# Patient Record
Sex: MALE | Race: WHITE | NOT HISPANIC OR LATINO | Employment: OTHER | URBAN - METROPOLITAN AREA
[De-identification: names, ages, dates, MRNs, and addresses within clinical notes are randomized per-mention and may not be internally consistent; named-entity substitution may affect disease eponyms.]

---

## 2017-01-30 ENCOUNTER — GENERIC CONVERSION - ENCOUNTER (OUTPATIENT)
Dept: OTHER | Facility: OTHER | Age: 57
End: 2017-01-30

## 2017-03-21 ENCOUNTER — GENERIC CONVERSION - ENCOUNTER (OUTPATIENT)
Dept: OTHER | Facility: OTHER | Age: 57
End: 2017-03-21

## 2017-04-26 ENCOUNTER — ALLSCRIPTS OFFICE VISIT (OUTPATIENT)
Dept: OTHER | Facility: OTHER | Age: 57
End: 2017-04-26

## 2017-07-24 ENCOUNTER — GENERIC CONVERSION - ENCOUNTER (OUTPATIENT)
Dept: OTHER | Facility: OTHER | Age: 57
End: 2017-07-24

## 2017-07-25 LAB
A/G RATIO (HISTORICAL): 1.5 (ref 1.2–2.2)
ALBUMIN SERPL BCP-MCNC: 4.4 G/DL (ref 3.5–5.5)
ALP SERPL-CCNC: 61 IU/L (ref 39–117)
ALT SERPL W P-5'-P-CCNC: 14 IU/L (ref 0–44)
AST SERPL W P-5'-P-CCNC: 16 IU/L (ref 0–40)
BILIRUB SERPL-MCNC: 0.4 MG/DL (ref 0–1.2)
BUN SERPL-MCNC: 20 MG/DL (ref 6–24)
BUN/CREA RATIO (HISTORICAL): 17 (ref 9–20)
CALCIUM SERPL-MCNC: 9.3 MG/DL (ref 8.7–10.2)
CHLORIDE SERPL-SCNC: 101 MMOL/L (ref 96–106)
CHOLEST SERPL-MCNC: 177 MG/DL (ref 100–199)
CHOLEST/HDLC SERPL: 3.2 RATIO UNITS (ref 0–5)
CO2 SERPL-SCNC: 24 MMOL/L (ref 18–29)
CREAT SERPL-MCNC: 1.21 MG/DL (ref 0.76–1.27)
EGFR AFRICAN AMERICAN (HISTORICAL): 75 ML/MIN/1.73
EGFR-AMERICAN CALC (HISTORICAL): 65 ML/MIN/1.73
GLUCOSE SERPL-MCNC: 99 MG/DL (ref 65–99)
HBA1C MFR BLD HPLC: 5.9 % (ref 4.8–5.6)
HDLC SERPL-MCNC: 55 MG/DL
LDLC SERPL CALC-MCNC: 99 MG/DL (ref 0–99)
POTASSIUM SERPL-SCNC: 4.4 MMOL/L (ref 3.5–5.2)
PROSTATE SPECIFIC ANTIGEN (HISTORICAL): 2.1 NG/ML (ref 0–4)
SODIUM SERPL-SCNC: 141 MMOL/L (ref 134–144)
TOT. GLOBULIN, SERUM (HISTORICAL): 3 G/DL (ref 1.5–4.5)
TOTAL PROTEIN (HISTORICAL): 7.4 G/DL (ref 6–8.5)
TRIGL SERPL-MCNC: 114 MG/DL (ref 0–149)
VLDLC SERPL CALC-MCNC: 23 MG/DL (ref 5–40)

## 2017-07-26 ENCOUNTER — ALLSCRIPTS OFFICE VISIT (OUTPATIENT)
Dept: OTHER | Facility: OTHER | Age: 57
End: 2017-07-26

## 2017-09-07 ENCOUNTER — GENERIC CONVERSION - ENCOUNTER (OUTPATIENT)
Dept: OTHER | Facility: OTHER | Age: 57
End: 2017-09-07

## 2017-09-08 ENCOUNTER — GENERIC CONVERSION - ENCOUNTER (OUTPATIENT)
Dept: OTHER | Facility: OTHER | Age: 57
End: 2017-09-08

## 2017-11-16 ENCOUNTER — GENERIC CONVERSION - ENCOUNTER (OUTPATIENT)
Dept: OTHER | Facility: OTHER | Age: 57
End: 2017-11-16

## 2017-11-16 LAB
LEFT EYE DIABETIC RETINOPATHY: NORMAL
RIGHT EYE DIABETIC RETINOPATHY: NORMAL

## 2017-11-21 LAB
A/G RATIO (HISTORICAL): 1.6 (ref 1.2–2.2)
ALBUMIN SERPL BCP-MCNC: 4.4 G/DL (ref 3.5–5.5)
ALP SERPL-CCNC: 63 IU/L (ref 39–117)
ALT SERPL W P-5'-P-CCNC: 18 IU/L (ref 0–44)
AST SERPL W P-5'-P-CCNC: 19 IU/L (ref 0–40)
BILIRUB SERPL-MCNC: 0.4 MG/DL (ref 0–1.2)
BUN SERPL-MCNC: 21 MG/DL (ref 6–24)
BUN/CREA RATIO (HISTORICAL): 19 (ref 9–20)
CALCIUM SERPL-MCNC: 9.6 MG/DL (ref 8.7–10.2)
CHLORIDE SERPL-SCNC: 101 MMOL/L (ref 96–106)
CHOLEST SERPL-MCNC: 146 MG/DL (ref 100–199)
CHOLEST/HDLC SERPL: 3.4 RATIO UNITS (ref 0–5)
CO2 SERPL-SCNC: 24 MMOL/L (ref 18–29)
CREAT SERPL-MCNC: 1.09 MG/DL (ref 0.76–1.27)
DEPRECATED RDW RBC AUTO: 13.4 % (ref 12.3–15.4)
EGFR AFRICAN AMERICAN (HISTORICAL): 85 ML/MIN/1.73
EGFR-AMERICAN CALC (HISTORICAL): 74 ML/MIN/1.73
GLUCOSE SERPL-MCNC: 124 MG/DL (ref 65–99)
HBA1C MFR BLD HPLC: 5.9 % (ref 4.8–5.6)
HCT VFR BLD AUTO: 40.2 % (ref 37.5–51)
HDLC SERPL-MCNC: 43 MG/DL
HGB BLD-MCNC: 13.7 G/DL (ref 12.6–17.7)
LDLC SERPL CALC-MCNC: 80 MG/DL (ref 0–99)
MCH RBC QN AUTO: 30.5 PG (ref 26.6–33)
MCHC RBC AUTO-ENTMCNC: 34.1 G/DL (ref 31.5–35.7)
MCV RBC AUTO: 90 FL (ref 79–97)
PLATELET # BLD AUTO: 237 X10E3/UL (ref 150–379)
POTASSIUM SERPL-SCNC: 4.3 MMOL/L (ref 3.5–5.2)
RBC (HISTORICAL): 4.49 X10E6/UL (ref 4.14–5.8)
SODIUM SERPL-SCNC: 139 MMOL/L (ref 134–144)
TOT. GLOBULIN, SERUM (HISTORICAL): 2.8 G/DL (ref 1.5–4.5)
TOTAL PROTEIN (HISTORICAL): 7.2 G/DL (ref 6–8.5)
TRIGL SERPL-MCNC: 117 MG/DL (ref 0–149)
VLDLC SERPL CALC-MCNC: 23 MG/DL (ref 5–40)
WBC # BLD AUTO: 6.4 X10E3/UL (ref 3.4–10.8)

## 2017-11-22 ENCOUNTER — GENERIC CONVERSION - ENCOUNTER (OUTPATIENT)
Dept: OTHER | Facility: OTHER | Age: 57
End: 2017-11-22

## 2017-11-28 ENCOUNTER — ALLSCRIPTS OFFICE VISIT (OUTPATIENT)
Dept: OTHER | Facility: OTHER | Age: 57
End: 2017-11-28

## 2017-11-28 DIAGNOSIS — M25.561 PAIN IN RIGHT KNEE: ICD-10-CM

## 2017-12-11 NOTE — PROGRESS NOTES
Assessment    1  Hypertension (401 9) (I10)   2  Hyperlipidemia (272 4) (E78 5)   3  Diabetes mellitus type 2, uncomplicated (673 47) (V17 8)   4  Non morbid obesity due to excess calories (278 00) (E66 09)   5  Chronic pain of right knee (406 11,798 27) (M25 561,G89 29)    Plan  Chronic pain of right knee    · * XR KNEE 3 VW RIGHT NON INJURY; Status:Active; Requested for:28Nov2017;   Diabetes mellitus type 2, uncomplicated    · (1) COMPREHENSIVE METABOLIC PANEL; Status:Hold For - Exact Date; Requestedfor:Approx 71WIX5781;    · (1) HEMOGLOBIN A1C; Status:Hold For - Exact Date; Requested for:Approx [id number];   Esophagitis, reflux    · Omeprazole 40 MG Oral Capsule Delayed Release; TAKE 1 CAPSULE EVERYDAY  Hyperlipidemia    · (1) LIPID PANEL, FASTING; Status:Hold For - Exact Date; Requested for:Lktycm09Tlj4662;    · (1) TSH; Status:Hold For - Exact Date; Requested for:Approx [id number]; Discussion/Summary  Possible side effects of new medications were reviewed with the patient/guardian today  The treatment plan was reviewed with the patient/guardian  The patient/guardian understands and agrees with the treatment plan     Self Referrals: No      Chief Complaint  Patient is here today for follow up of chronic conditions described in HPI  History of Present Illness  6 month followup to return to Omeprazole due to GERD on Famotidine  Problem with R knee      Active Problems    1  Depression (311) (F32 9)   2  Diabetes mellitus type 2, uncomplicated (365 33) (L34 5)   3  Diverticulosis of large intestine without perforation or abscess without bleeding (562 10) (K57 30)   4  Dyspnea, unspecified type (786 09) (R06 00)   5  Encounter for screening colonoscopy (V76 51) (Z12 11)   6  Esophagitis, reflux (530 11) (K21 0)   7  Hyperlipidemia (272 4) (E78 5)   8  Hypertension (401 9) (I10)   9  Non morbid obesity due to excess calories (278 00) (E66 09)   10  Obstructive sleep apnea (327 23) (G47 33)   11   Palpitations (785  1) (R00 2)   12  Persistent insomnia (307 42) (G47 00)    Past Medical History  1  Denied: History of Alcohol abuse   2  History of Atrial premature complex (427 61) (I49 1)   3  History of diverticulitis of colon (V12 79) (Z87 19)   4  History of herpes simplex infection (V12 09) (Z86 19)   5  History of nicotine dependence (V15 82) (Z87 891)   6  Denied: History of substance abuse   7  Denied: History of Mental health problem   8  History of Nasal folliculitis (400 7) (V28 1)   9  History of Palpitations (785 1) (R00 2)   10  History of Umbilical hernia (169 2) (K42 9)    The active problems and past medical history were reviewed and updated today  Surgical History  1  Denied: History Of Prior Surgery   2  History of Partial Colectomy - Sigmoid   3  History of Umbilical Hernia Repair    The surgical history was reviewed and updated today  Family History  Mother    1  Denied: Family history of Alcohol abuse   2  Family history of Depression   3  Denied: Family history of substance abuse   4  Denied: Family history of Mental health problem  Father    5  Family history of Acute Myocardial Infarction (V17 3)   6  Denied: Family history of Alcohol abuse   7  Denied: Family history of substance abuse   8  Denied: Family history of Mental health problem  Sister    5  Family history of Depression   10  Family history of Depression    The family history was reviewed and updated today  Social History     · Being A Social Drinker   · History of Current Every Day Smoker (305 1)   · Former smoker (V15 82) (E24 999)   · Full-time employment   · Marital History - Single   · No drug use   · Non-smoker (V49 89) (Z78 9)   · Occasional alcohol use   · Single  The social history was reviewed and updated today  The social history was reviewed and is unchanged  Current Meds   1  Amlodipine Besy-Benazepril HCl - 5-20 MG Oral Capsule; take 1 capsule by mouth once daily;  Therapy: 23DXO8648 to (Evaluate:04Ffl7497)  Requested for: 26Apr2017; Last Rx:26Apr2017 Ordered   2  Aspirin 81 MG TABS; TAKE 1 TABLET DAILY; Therapy: 37FXJ7121 to (Evaluate:07Jun2013) Recorded   3  Valarie Contour Next Test In [employer]; TEST TWICE DAILY AS DIRECTED; Therapy: 36UPI3635 to (Evaluate:14May2017); Last Rx:26Mar2017 Ordered   4  Famotidine 20 MG Oral Tablet; take one tablet by mouth twice daily; Therapy: 47LRB6952 to (Evaluate:27Mar2018)  Requested for: 87YWW1036; Last Rx:05Afz9050 Ordered   5  Ipratropium-Albuterol 0 5-2 5 (3) MG/3ML Inhalation Solution; Inh q 8 hrs prn SOB/wheeze in Neb; Therapy: 92SGR7838 to (Last Rx:03Mar2015)  Requested for: 22XBA7992 Ordered   6  Lancets Miscellaneous; TEST TWICE A DAY; Therapy: 25WAW0982 to (Last Rx:73Jpy3260)  Requested for: 83WUC4794 Ordered   7  Omeprazole 40 MG Oral Capsule Delayed Release; TAKE 1 CAPSULE EVERY DAY; Therapy: 61DOY0096 to (Evaluate:12Jun2017)  Requested for: 05ZEU3514; Last Rx:14Nov2016 Ordered   8  Simvastatin 40 MG Oral Tablet; take 1 tablet every day; Therapy: 34EQQ5062 to (Naye Prabhakar)  Requested for: 45DQD9548; Last Rx:93Kkm1120 Ordered   9  Triamcinolone Acetonide 0 1 % Mouth/Throat Paste; Apply to sore three times a day; Therapy: 96KGY3011 to (Last Rx:07Mar2016)  Requested for: 52VOC0190 Ordered   10  Zostavax 14752 UNT/0 65ML Subcutaneous Solution Reconstituted; administer as  directed; Therapy: 43XKB2369 to (Last Rx:59Uio7757) Ordered    The medication list was reviewed and updated today  Allergies  1   No Known Drug Allergies    Vitals  Vital Signs    Recorded: 28Nov2017 02:46PM   Temperature 98 4 F, Tympanic   Heart Rate 64   Systolic 675, Sitting   Diastolic 72, Sitting   BP CUFF SIZE Large   Height 5 ft 9 in   Weight 224 lb 12 8 oz   BMI Calculated 33 2   BSA Calculated 2 17     Results/Data  PHQ-9 Adult Depression Screening 28Nov2017 03:11PM Corinnefrancisco javier Delfina     Test Name Result Flag Reference   PHQ-9 Adult Depression Score 7       Over the last two weeks, how often have you been bothered by any of the following problems? Little interest or pleasure in doing things: Not at all - 0 Feeling down, depressed, or hopeless: Not at all - 0 Trouble falling or staying asleep, or sleeping too much: Nearly every day - 3 Feeling tired or having little energy: Not at all - 0 Poor appetite or over eating: More than half the days - 2 Feeling bad about yourself - or that you are a failure or have let yourself or your family down: Not at all - 0 Trouble concentrating on things, such as reading the newspaper or watching television: More than half the days - 2 Moving or speaking so slowly that other people could have noticed  Or the opposite -  being so fidgety or restless that you have been moving around a lot more than usual: Not at all - 0 Thoughts that you would be better off dead, or of hurting yourself in some way: Not at all - 0   PHQ-9 Adult Depression Screening Negative     PHQ-9 Difficulty Level Somewhat difficult     PHQ-9 Severity Mild Depression           Health Management  Encounter for screening colonoscopy   COLONOSCOPY; every 5 years; Last 07Sep2017; Next Due: 78TLN7976;  Active    Signatures   Electronically signed by : Raford Kawasaki, MD; Dec 10 2017 12:42PM EST                       (Author)

## 2018-01-09 ENCOUNTER — ALLSCRIPTS OFFICE VISIT (OUTPATIENT)
Dept: OTHER | Facility: OTHER | Age: 58
End: 2018-01-09

## 2018-01-10 NOTE — PROGRESS NOTES
Assessment   1  Hypertension (401 9) (I10)   2  Diabetes mellitus type 2, uncomplicated (295 48) (C04 5)   3  BPH with obstruction/lower urinary tract symptoms (600 01,599 69) (N40 1,N13 8)    Plan   BPH with obstruction/lower urinary tract symptoms    · Tamsulosin HCl - 0 4 MG Oral Capsule; TAKE 1 CAPSULE Daily  Diabetes mellitus type 2, uncomplicated    · (1) COMPREHENSIVE METABOLIC PANEL; Status:Hold For - Exact Date; Requested    for:Approx 19BVO7712;    · (1) HEMOGLOBIN A1C; Status:Hold For - Exact Date; Requested for:Approx 18XWY9766;    · (1) TSH; Status:Hold For - Exact Date; Requested for:Approx 56CCM3295;   Hypertension    · Metoprolol Succinate ER 25 MG Oral Tablet Extended Release 24 Hour; TAKE 1    TABLET ONCE DAILY   · Follow-up visit in 3 months Evaluation and Treatment  Follow-up  Status: Hold For -    Scheduling  Requested for: 69NGR4184    Discussion/Summary      BP is labile and he wants to achieve tighter control  Continue Lotrel 10/40 and add Metoprolol ER 25 mg daily fairly stable but not surprisingly is weight dependent  return to carb management and recheck in 3 months  with normal PSA  Trial Rx Flomax  The was counseled regarding diagnostic results,-- instructions for management,-- risk factor reductions,-- impressions  Possible side effects of new medications were reviewed with the patient/guardian today  The treatment plan was reviewed with the patient/guardian  The patient/guardian understands and agrees with the treatment plan       Self Referrals: No      Chief Complaint   Patient is here today for follow up of chronic conditions described in HPI  History of Present Illness   BP elevations on his own cuff all different times of day and night  Readings are mostly 140-160/80's  headaches or chest pain are somewhat more elevated recently up to 160's he needs to get back to proper eating, going to [place] for 6 weeks   more difficulty with nocturia and trouble passing a full bladder      Review of Systems        Constitutional: recent weight gain, but-- not feeling poorly-- and-- not feeling tired  Cardiovascular: no chest pain,-- no palpitations-- and-- no extremity edema  Respiratory: no cough-- and-- no shortness of breath during exertion  Gastrointestinal: no abdominal pain  Genitourinary: nocturia, but-- no dysuria  Active Problems   1  Chronic pain of right knee (818 49,386 00) (M25 561,G89 29)   2  Depression (311) (F32 9)   3  Diabetes mellitus type 2, uncomplicated (804 98) (E98 8)   4  Diverticulosis of large intestine without perforation or abscess without bleeding (562 10)     (K57 30)   5  Dyspnea, unspecified type (786 09) (R06 00)   6  Encounter for screening colonoscopy (V76 51) (Z12 11)   7  Esophagitis, reflux (530 11) (K21 0)   8  Hyperlipidemia (272 4) (E78 5)   9  Hypertension (401 9) (I10)   10  Non morbid obesity due to excess calories (278 00) (E66 09)   11  Obstructive sleep apnea (327 23) (G47 33)   12  Palpitations (785 1) (R00 2)   13  Persistent insomnia (307 42) (G47 00)    Past Medical History   1  Denied: History of Alcohol abuse   2  History of Atrial premature complex (427 61) (I49 1)   3  History of diverticulitis of colon (V12 79) (Z87 19)   4  History of herpes simplex infection (V12 09) (Z86 19)   5  History of nicotine dependence (V15 82) (Z87 891)   6  Denied: History of substance abuse   7  Denied: History of Mental health problem   8  History of Nasal folliculitis (424 4) (O54 3)   9  History of Palpitations (785 1) (R00 2)   10  History of Umbilical hernia (407 5) (K42 9)     The active problems and past medical history were reviewed and updated today  Surgical History   1  Denied: History Of Prior Surgery   2  History of Partial Colectomy - Sigmoid   3  History of Umbilical Hernia Repair     The surgical history was reviewed and updated today  Family History   Mother    1   Denied: Family history of Alcohol abuse   2  Family history of Depression   3  Denied: Family history of substance abuse   4  Denied: Family history of Mental health problem  Father    5  Family history of Acute Myocardial Infarction (V17 3)   6  Denied: Family history of Alcohol abuse   7  Denied: Family history of substance abuse   8  Denied: Family history of Mental health problem  Sister    5  Family history of Depression   10  Family history of Depression     The family history was reviewed and updated today  Social History    · Being A Social Drinker   · History of Current Every Day Smoker (305 1)   · Former smoker (V15 82) (R89 764)   · Full-time employment   · Marital History - Single   · No drug use   · Non-smoker (V49 89) (Z78 9)   · Occasional alcohol use   · Single  The social history was reviewed and updated today  The social history was reviewed and is unchanged  Current Meds    1  Amlodipine Besy-Benazepril HCl - 10-40 MG Oral Capsule; take one capsule by mouth     daily; Therapy: 65IUL4157 to (Benny Murray)  Requested for: 36KVJ4933 Recorded   2  Aspirin 81 MG TABS; TAKE 1 TABLET DAILY; Therapy: 94VAK0291 to (Evaluate:07Jun2013) Recorded   3  Valarie Contour Next Test In [employer]; TEST TWICE DAILY AS DIRECTED; Therapy: 95DGH1484 to (Evaluate:19Nrc9545); Last Rx:26Mar2017 Ordered   4  Valarie Contour Normal In Vitro Liquid; USE AS DIRECTED; Therapy: 90ZEQ9987 to (Last Rx:27Zdr2602)  Requested for: 09Yrb0869 Ordered   5  Famotidine 20 MG Oral Tablet; take one tablet by mouth twice daily; Therapy: 27NZT1571 to (Evaluate:27Mar2018)  Requested for: 79ZLE1831; Last     Rx:52Pns1256 Ordered   6  Ipratropium-Albuterol 0 5-2 5 (3) MG/3ML Inhalation Solution; Inh q 8 hrs prn     SOB/wheeze in Neb; Therapy: 55YWW6981 to (Last Rx:03Mar2015)  Requested for: 20KDT7892 Ordered   7  Lancets Miscellaneous; TEST TWICE A DAY; Therapy: 03YNZ6788 to (Last Rx:75Ufd1891)  Requested for: 92QOH1790 Ordered   8  Omeprazole 40 MG Oral Capsule Delayed Release; TAKE 1 CAPSULE EVERY DAY; Therapy: 89GGG8624 to (Evaluate:26Jun2018)  Requested for: 89WQH2289; Last     Rx:28Nov2017 Ordered   9  Simvastatin 40 MG Oral Tablet; take 1 tablet every day; Therapy: 34LLK6928 to (Pratibha Lawson)  Requested for: 67JSF8236; Last     Rx:41Zag2520 Ordered   10  Triamcinolone Acetonide 0 1 % Mouth/Throat Paste; Apply to sore three times a day; Therapy: 52MND0600 to (Last Rx:07Mar2016)  Requested for: 96KTK5892 Ordered   11  Zostavax 42727 UNT/0 65ML Subcutaneous Solution Reconstituted; administer as      directed; Therapy: 34YUD2255 to (Last Rx:79Mqo6887) Ordered     The medication list was reviewed and updated today  Allergies   1  No Known Drug Allergies    Vitals   Vital Signs    Recorded: 27BRK4788 04:28PM   Temperature 98 8 F, Tympanic   Heart Rate 82, L Radial   Pulse Quality Regular, L Radial   Systolic 775, LUE, Sitting   Diastolic 76, LUE, Sitting   BP CUFF SIZE Large   Height 5 ft 9 in   Weight 229 lb 12 8 oz   BMI Calculated 33 94   BSA Calculated 2 19     Physical Exam        Constitutional      General appearance: No acute distress, well appearing and well nourished  Pulmonary      Respiratory effort: No increased work of breathing or signs of respiratory distress  Auscultation of lungs: Clear to auscultation, equal breath sounds bilaterally, no wheezes, no rales, no rhonci  Cardiovascular      Auscultation of heart: Normal rate and rhythm, normal S1 and S2, without murmurs  Examination of extremities for edema and/or varicosities: Normal        Carotid pulses: Normal        Lymphatic      Palpation of lymph nodes in neck: No lymphadenopathy  Skin      Skin and subcutaneous tissue: Normal without rashes or lesions         Psychiatric      Orientation to person, place and time: Normal        Mood and affect: Normal           Health Management   Encounter for screening colonoscopy   COLONOSCOPY; every 5 years; Last 07Sep2017; Next Due: 64UYA1686;  Active    Signatures    Electronically signed by : Imelda Chavira MD; Jan 9 2018  8:38PM EST                       (Author)

## 2018-01-10 NOTE — MISCELLANEOUS
Message   Recorded as Task   Date: 03/21/2017 09:26 AM, Created By: James Roblero   Task Name: Medical Complaint Callback   Assigned To: [address] [place] [address]   Regarding Patient: Edmar Cerna, Status: Active   Comment:    Marii Welsh - 21 Mar 2017 9:26 AM     TASK CREATED  Caller: Self; [phone] (Mobile Phone)  Dr Fletcher Santiago had given Vee Rubia Famolidine 20mg once daily  He mistakenly took it twice a day for several weeks and was really feeling better taking it twice daily  He talked to his pharmacist if there was any danger with that and she told him no concerns  Linette Ness would like a [facility], since he was doing so much better taking it twice a day  Kosta Ariza - 21 Mar 2017 9:40 AM     TASK REASSIGNED: Previously Assigned To [address] [place] [address]  please advise   Noah Callaway - 21 Mar 2017 2:04 PM     TASK REPLIED TO: Previously Assigned To Noah Callaway Shelby - 21 Mar 2017 2:06 PM     TASK EDITED  Patient notified        Active Problems    1  Depression (311) (F32 9)   2  Diabetes mellitus type 2, uncomplicated (741 50) (M41 7)   3  Diarrhea (787 91) (R19 7)   4  Diverticulosis of large intestine without perforation or abscess without bleeding (562 10)   (K57 30)   5  Dyspnea, unspecified type (786 09) (R06 00)   6  Encounter for screening colonoscopy (V76 51) (Z12 11)   7  Esophagitis, reflux (530 11) (K21 0)   8  Fatigue (780 79) (R53 83)   9  Herpes simplex infection (054 9) (B00 9)   10  Hyperglycemia (790 29) (R73 9)   11  Hyperlipidemia (272 4) (E78 5)   12  Hypertension (401 9) (I10)   13  Injury of right thumb (959 5) (S69 91XA)   14  Nasal folliculitis (280 6) (Y36 3)   15  Non morbid obesity due to excess calories (278 00) (E66 09)   16  Obstructive sleep apnea (327 23) (G47 33)   17  Palpitations (785 1) (R00 2)   18  Persistent insomnia (307 42) (G47 00)   19  Plantar fasciitis (728 71) (M72 2)   20  Recurrent aphthous ulcer (528 2) (K12 0)   21  Skin rash (782 1) (R21)   22  Special screening for malignant neoplasm of prostate (V76 44) (Z12 5)    Current Meds   1  Amlodipine Besy-Benazepril HCl - 10-40 MG Oral Capsule; take one capsule by mouth   daily; Therapy: 65Ejy6486 to (Evaluate:16Apr2017)  Requested for: 91LLT7595; Last   Rx:18Oct2016 Ordered   2  Aspirin 81 MG TABS; TAKE 1 TABLET DAILY; Therapy: 80WGV8276 to (Evaluate:07Jun2013) Recorded   3  [employer] Contour Link Monitor w/Device Kit; Test fasting moning sugar and then 2 hours   after evening meal;   Therapy: 12QBH4758 to (Last Rx:10Feb2017)  Requested for: 53ZAR6288 Ordered   4  Valarie Contour Test In Vitro Strip; TEST TWICE DAILY; Therapy: 99KJG7692 to (Evaluate:09Aug2017)  Requested for: 97XLT6547; Last   Rx:10Feb2017 Ordered   5  Belsomra 10 MG Oral Tablet; take 1 tablet at  bedtime as needed for insomnia; Therapy: 25JZZ9939 to (Evaluate:06Oct2016) Recorded   6  Famotidine 20 MG Oral Tablet; take one tablet by mouth twice daily; Therapy: 93XRR2409 to (Evaluate:17Sep2017)  Requested for: 21Mar2017; Last   Rx:21Mar2017 Ordered   7  Ipratropium-Albuterol 0 5-2 5 (3) MG/3ML Inhalation Solution; Inh q 8 hrs prn   SOB/wheeze in Neb; Therapy: 91FCX7700 to (Last Rx:03Mar2015)  Requested for: 30PZV6895 Ordered   8  Lancets Miscellaneous; TEST TWICE A DAY; Therapy: 73LDD8614 to (Last Rx:10Feb2017)  Requested for: 50VFB2103 Ordered   9  MetFORMIN HCl - 500 MG Oral Tablet; TAKE 1 TABLET BY MOUTH ONCE DAILY; Therapy: 34YLE6199 to (Evaluate:07Jun2017)  Requested for: 17POM7809; Last   Rx:93Hkr6208 Ordered   10  Omeprazole 40 MG Oral Capsule Delayed Release; TAKE 1 CAPSULE EVERY DAY; Therapy: 39QWF8000 to (Evaluate:12Jun2017)  Requested for: 18HUS2964; Last    Rx:14Nov2016 Ordered   11  Simvastatin 40 MG Oral Tablet; take 1 tablet every day; Therapy: 18RVJ4096 to (Evaluate:10Aug2017)  Requested for: 74Jaw8546; Last    Rx:32Qki7876 Ordered   12  Triamcinolone Acetonide 0 1 % Mouth/Throat Paste; Apply to sore three times a day; Therapy: 90JPG0402 to (Last Rx:07Mar2016)  Requested for: 28WTE6503 Ordered   13  Zostavax 82969 UNT/0 65ML Subcutaneous Solution Reconstituted (Zostavax 14215    UNT/0 65ML Subcutaneous Solution Reconstituted); administer as directed; Therapy: 17XBD7146 to (Last Rx:84Zpj3115) Ordered    Allergies    1   No Known Drug Allergies    Signatures   Electronically signed by : Mary Garsia, ; Mar 21 2017  2:07PM EST                       (Author)

## 2018-01-11 NOTE — RESULT NOTES
Message   Recorded as Task   Date: 01/29/2017 08:58 PM, Created By: Deberah Hashimoto   Task Name: Call Back   Assigned To: [address] [facility]   Regarding Patient: Elsa Hawkins, Status: Active   Comment:    Noah Callaway - 29 Jan 2017 8:58 PM     TASK CREATED  Stool test was negative for blood   Anu Ledezma - 30 Jan 2017 2:56 PM     TASK EDITED  Pt aware  Signatures   Electronically signed by :  Joanna Reid, ; Jan 30 2017  2:59PM EST                       (Author)

## 2018-01-11 NOTE — RESULT NOTES
Verified Results  CT CORONARY CALCIUM SCORE [id number] 09:18AM Ivy Bowman Order Number: [id number]   Performing Comments: This is a $99 screening that the Pt pays out of pocket for  Pt is aware that his insurance does not cover  - Patient Instructions: To schedule this appointment, please contact Central Scheduling at [phone]  Test Name Result Flag Reference   CT CORONARY CALCIUM SCORE (Report)     CT CORONARY ARTERY CALCIUM SCREENING WITHOUT INTRAVENOUS CONTRAST     INDICATION: Strong family history of coronary artery disease  Hypercholesterolemia  Hypertension  Assess for calcific coronary plaque  TECHNIQUE: Low radiation dose computed tomography of the heart was performed with EKG gating, suspended respiration, and without intravenous contrast  Examination was performed utilizing techniques to minimize radiation dose, including the use of dose    reduction software  Postprocessing was performed on a 3-D computer workstation to measure the amount of calcium in the coronary arteries  Imaging was limited to the heart and the immediately adjacent lungs  FINDINGS:      Coronary artery calcium score breakdown is as follows (note: calcified atherosclerotic plaque in the posterior descending artery is included in right coronary artery score for right dominant circulation and left circumflex score for left dominant    circulation):     Left main coronary artery: 0   Left anterior descending coronary artery and diagonal branches: 8   Left circumflex coronary artery and left marginal branches: 8   Right coronary artery and right marginal branches: 0     TOTAL coronary calcium score: 16   Calcium score PERCENTILE of age, race, and gender matched database participants in the Multi-Ethnic Study of Atherosclerosis (CANTOR) trial:  64th     There is mild circumferential thickening of the wall  No significant abnormality is identified in the heart or visualized surrounding tissues  IMPRESSION:     Total cardiac score equals 16  For more useful information regarding the prognostic significance of the calcium score, please consult the calculator at the website https://www any-young org/  aspx  There is mild circumferential thickening of the wall of the distal esophagus which is a nonspecific finding that can sometimes be seen in the setting of reflux esophagitis  Workstation performed: [id number]     Signed by:   Hina Peter MD   12/21/16       Discussion/Summary   Cardiac Calcium score is good  He is at an average, not elevated risk of heart blockages  Good report          Patient notified of results

## 2018-01-12 ENCOUNTER — GENERIC CONVERSION - ENCOUNTER (OUTPATIENT)
Dept: OTHER | Facility: OTHER | Age: 58
End: 2018-01-12

## 2018-01-12 VITALS
SYSTOLIC BLOOD PRESSURE: 100 MMHG | TEMPERATURE: 98 F | WEIGHT: 217.2 LBS | HEART RATE: 68 BPM | HEIGHT: 68 IN | BODY MASS INDEX: 32.92 KG/M2 | DIASTOLIC BLOOD PRESSURE: 58 MMHG

## 2018-01-12 NOTE — PROGRESS NOTES
Assessment    1  Diarrhea (787 91) (R19 7)   2  Skin rash (782 1) (R21)   3  History of Tick bite (919 4,E906 4) (T14 8,W57  XXXA)    Plan  Tick bite    · (1) LYME ANTIBODY PROFILE W/REFLEX TO WESTERN BLOT; Status:Active; Requested  VFA:[id number];     Discussion/Summary    Diarrhea - likely viral gastroenteritis - advised bland diet and keep hydrated; exam nml but with h/o diverticulitis advised to call with worsening abd pain/F/C/blood in stool; can use Immodium with each loose stool but do not take if abd pain is worse/F/blood in stool occurs; will need further eval if not better in 7-10 days    Rash with h/o tick bite - tx with Doxy in Nov; will check Lyme test; pt deferring any steroid cream at this time; told to call if rash worsens/spreads  The patient was counseled regarding instructions for management, impressions  The treatment plan was reviewed with the patient/guardian  The patient/guardian understands and agrees with the treatment plan      Chief Complaint  tick bite      History of Present Illness  HPI: Pt here for concern over past tick bite  He had a tick bite in Nov and was placed on Doxy 100 mg  He has had no known tick bite since then  He has a rash on his R flank that started today  He notes it is not a bulls eye rash but he is very concerned he has Lyme dz as he has had mult tick bites and has friends with serious SE of Lyme dz  He notes the rash does not hurt and does not itch  He has not tried anything on the rash yet  He notes no new joint pains/Dizziness/regular HA's  He does feel fatigued but thinks its d/t his stomach issues noted below  He has had some diarrhea the past two days and has had intermittent low pelvic pain  The abd pain is is crampy and is intermittent and he cannot identify any specific provoking factors for the pain  He has had no N/V/F/C/blood in the stool  He has not taken anything for the diarrhea  He notes no abx or change in meds or change in diet recently   He has h/o diverticulitis and has had sigmoid resection d/t this  Review of Systems    Constitutional: feeling tired and recent 7 lb weight loss, but no fever, not feeling poorly and no chills  ENT: no sore throat and no nasal discharge  Cardiovascular: no chest pain and no palpitations  Respiratory: no shortness of breath and no cough  Gastrointestinal: abdominal pain and diarrhea, but no nausea, no vomiting, no constipation and no blood in stools  Genitourinary: no dysuria  Musculoskeletal: no joint stiffness  Integumentary: a rash, but as noted in HPI  Neurological: no headache and no dizziness  Active Problems    1  Depression (311) (F32 9)   2  Diverticulosis of large intestine without perforation or abscess without bleeding (562 10)   (K57 30)   3  Encounter for screening colonoscopy (V76 51) (Z12 11)   4  Esophagitis, reflux (530 11) (K21 0)   5  Fatigue (780 79) (R53 83)   6  Herpes simplex infection (054 9) (B00 9)   7  Hyperglycemia (790 29) (R73 9)   8  Hyperlipidemia (272 4) (E78 5)   9  Hypertension (401 9) (I10)   10  Non morbid obesity due to excess calories (278 00) (E66 09)   11  Obstructive sleep apnea (327 23) (G47 33)   12  Palpitations (785 1) (R00 2)   13  Persistent insomnia (307 42) (G47 00)   14  Plantar fasciitis (728 71) (M72 2)   15  Special screening for malignant neoplasm of prostate (V76 44) (Z12 5)    Social History    · Being A Social Drinker   · History of Current Every Day Smoker (305 1)   · Former smoker (V15 82) (G63 906)   · Full-time employment   · Marital History - Single   · No drug use   · Occasional alcohol use   · Single  The social history was reviewed and is unchanged  Current Meds   1  Amlodipine Besy-Benazepril HCl - 10-40 MG Oral Capsule; take one capsule by mouth   daily; Therapy: 19Apr2013 to (Evaluate:58Hjf2004)  Requested for: 02MYB2716; Last   Rx:45Afg1641 Ordered   2  Aspirin 81 MG Oral Tablet; TAKE 1 TABLET DAILY;    Therapy: 48EIR6038 to (Evaluate:07Jun2013) Recorded   3  Estazolam 2 MG Oral Tablet; take 1 tablet at bedtime; Therapy: 95DRE3186 to (Evaluate:29Apr2015); Last Rx:85Jmx4578 Ordered   4  Ipratropium-Albuterol 0 5-2 5 (3) MG/3ML Inhalation Solution; Inh q 8 hrs prn   SOB/wheeze in Neb; Therapy: 90RJU6246 to (Last Rx:03Mar2015)  Requested for: 01EZA7183 Ordered   5  Omeprazole 40 MG Oral Capsule Delayed Release; TAKE 1 CAPSULE EVERY DAY; Therapy: 22SAP0887 to (Evaluate:07Yzl6928)  Requested for: 80MJX1210; Last   Rx:83Eqm7899 Ordered   6  Phentermine HCl - 37 5 MG Oral Tablet; take 1/2 tablet by mouth every morning and 1/2   tablet AT NOON - 3 WEEKS PER MONTH;   Therapy: 75XYG3078 to (Evaluate:28Hrn2941); Last Rx:58Txd6757 Ordered   7  Simvastatin 40 MG Oral Tablet; take 1 tablet every day; Therapy: 64HVJ3871 to (Evaluate:20Apr2016)  Requested for: 95Cjw8166; Last   Rx:69Pjn8749 Ordered   8  ValACYclovir HCl - 500 MG Oral Tablet; TAKE (1) TABLET TWICE A DAY FOR 5 DAYS; Therapy: 01Apr2015 to (Last Rx:01Apr2015)  Requested for: 01Apr2015 Ordered   9  Zostavax 82865 UNT/0 65ML Subcutaneous Solution Reconstituted; administer as   directed; Therapy: 03YIR4270 to (Last Rx:37Pnf0548) Ordered    The medication list was reviewed and updated today  Allergies    1  No Known Drug Allergies    Vitals   Recorded: 81QXV5224 04:21PM Recorded: 38CNV8411 04:13PM   Temperature 97 9 F 97 9 F   Heart Rate 68 68   Systolic 993 917   Diastolic 70 70   Height 5 ft 8 in 5 ft 8 in   Weight 228 lb  228 lb    BMI Calculated 34 67 34 67   BSA Calculated 2 16 2 17     Physical Exam    Constitutional   General appearance: No acute distress, well appearing and well nourished  Pulmonary   Respiratory effort: No increased work of breathing or signs of respiratory distress  Auscultation of lungs: Clear to auscultation, equal breath sounds bilaterally, no wheezes, no rales, no rhonci      Cardiovascular   Auscultation of heart: Normal rate and rhythm, normal S1 and S2, without murmurs  Examination of extremities for edema and/or varicosities: Normal     Abdomen soft, NT/ND, no rebound or guarding  Lymphatic   Palpation of lymph nodes in neck: No lymphadenopathy  Musculoskeletal   Gait and station: Normal     Skin R flank region with an ill defined erythematous /blotchy skin discoloration - no papules/pustules or bulls eye appearance     Psychiatric   Mood and affect: Normal          Future Appointments    Date/Time Provider Specialty Site   03/07/2016 08:20 AM Ronnell La MD [address] [place] [address] MD     Signatures   Electronically signed by : Jo Ann Rockwell DO; Jan 19 2016  4:33PM EST                       (Author)

## 2018-01-13 VITALS
BODY MASS INDEX: 33.3 KG/M2 | DIASTOLIC BLOOD PRESSURE: 72 MMHG | WEIGHT: 224.8 LBS | SYSTOLIC BLOOD PRESSURE: 130 MMHG | HEART RATE: 64 BPM | HEIGHT: 69 IN | TEMPERATURE: 98.4 F

## 2018-01-14 VITALS
DIASTOLIC BLOOD PRESSURE: 76 MMHG | BODY MASS INDEX: 33.62 KG/M2 | HEIGHT: 68 IN | WEIGHT: 221.8 LBS | TEMPERATURE: 97.6 F | HEART RATE: 66 BPM | SYSTOLIC BLOOD PRESSURE: 124 MMHG

## 2018-01-15 NOTE — PROGRESS NOTES
Assessment    1  Encounter for preventive health examination (V70 0) (Z00 00)   2  Hypertension (401 9) (I10)   3  Hyperlipidemia (272 4) (E78 5)   4  Hyperglycemia (790 29) (R73 9)   5  Dyspnea, unspecified type (786 09) (R06 00)   6  Diabetes mellitus type 2, uncomplicated (608 23) (W75 8)    Plan  Diabetes mellitus type 2, uncomplicated    · MetFORMIN HCl - 500 MG Oral Tablet; TAKE 1 TABLET BY MOUTH ONCE DAILY  Dyspnea, unspecified type    · * XR CHEST PA & LATERAL; Status:Active; Requested for:95Gdn9301;   Hyperglycemia    · Follow-up visit in 3 months Evaluation and Treatment  Follow-up  Status: Hold For -  Scheduling  Requested for: 43UGV1185   · (1) BASIC METABOLIC PROFILE; Status:Hold For - Exact Date; Requested for:Approx  62ARJ2300;    · (1) HEMOGLOBIN A1C; Status:Hold For - Exact Date; Requested for:Approx 93JUJ0680;   Hyperlipidemia, Hypertension    · CT CORONARY CALCIUM SCORE; Status:Need Information - Financial Authorization; Requested for:67Irg5287;   Hypertension, Palpitations    · EKG/ECG- POC; Status:Complete;   Done: 67EWK6091 02:25PM    Discussion/Summary  Impression: health maintenance visit  Currently, he eats a healthy diet and has an inadequate exercise regimen  Prostate cancer screening: prostate cancer screening is current  Testicular cancer screening: monthly self testicular exam was advised  Colorectal cancer screening: colorectal cancer screening is current, colonoscopy is needed every five years and done 8/23/12  Extensive review of labs with progressive increase in A1c and agrees to start Metformin with intention to lose weight and further decrease weight to be able to discontinue medication in future  Lipids excellent   He is very concerned about possible cardiac disease and wants to know if he has any blockages  He is agreeable to CT cardiac calcium scoring  Patient is able to Self-Care        Chief Complaint  HM      History of Present Illness  HM, Adult Male: The patient is being seen for a health maintenance evaluation  Social History: He is unmarried  Work status: working full time  The patient is a former cigarette smoker  He reports occasional alcohol use  The patient has no concerns about alcohol abuse  He has never used illicit drugs  General Health: The patient's health since the last visit is described as good  He has regular dental visits  He denies vision problems  He denies hearing loss  Immunizations status: up to date  Lifestyle:  He consumes a diverse and healthy diet  He has weight concerns  He does not exercise regularly  He does not use tobacco  He denies alcohol use  He denies drug use  Screening: cancer screening reviewed and current  metabolic screening reviewed and current  risk screening reviewed and current  HPI: Wants heart and lungs checked out  Feels like he is unhealthy, wants to lose weight  Cant stop eating  Review of Systems    Constitutional: no fever and no chills  ENT: no earache  Cardiovascular: no chest pain, no intermittent leg claudication, no palpitations and no extremity edema  Respiratory: no cough and no shortness of breath during exertion  Gastrointestinal: no abdominal pain and no constipation  Genitourinary: no dysuria  Musculoskeletal: no arthralgias  Integumentary: no rashes  Neurological: no headache and no dizziness  Psychiatric: no anxiety and no depression  Endocrine: no muscle weakness  Hematologic/Lymphatic: no tendency for easy bruising  Active Problems    1  Depression (311) (F32 9)   2  Diarrhea (787 91) (R19 7)   3  Diverticulosis of large intestine without perforation or abscess without bleeding (562 10)   (K57 30)   4  Encounter for screening colonoscopy (V76 51) (Z12 11)   5  Esophagitis, reflux (530 11) (K21 0)   6  Fatigue (780 79) (R53 83)   7  Herpes simplex infection (054 9) (B00 9)   8  Hyperglycemia (790 29) (R73 9)   9  Hyperlipidemia (272 4) (E78 5)   10   Hypertension (401  9) (I10)   11  Nasal folliculitis (949 2) (K55 7)   12  Non morbid obesity due to excess calories (278 00) (E66 09)   13  Obstructive sleep apnea (327 23) (G47 33)   14  Palpitations (785 1) (R00 2)   15  Persistent insomnia (307 42) (G47 00)   16  Plantar fasciitis (728 71) (M72 2)   17  Recurrent aphthous ulcer (528 2) (K12 0)   18  Skin rash (782 1) (R21)   19  Special screening for malignant neoplasm of prostate (V76 44) (Z12 5)    Past Medical History    · History of Atrial premature complex (427 61) (I49 1)   · History of Bite From A Nonvenomous Arthropod (E906 4)   · History of bronchitis (V12 69) (Z87 09)   · History of diverticulitis of colon (V12 79) (Z87 19)   · History of nicotine dependence (V15 82) (Z87 891)   · History of Palpitations (785 1) (R00 2)   · History of Tick bite (919 4,E906 4) (W57 XXXA)   · History of Umbilical hernia (142 1) (K42 9)    Surgical History    · Denied: History Of Prior Surgery   · History of Partial Colectomy - Sigmoid   · History of Umbilical Hernia Repair    Family History  Mother    · Family history of Depression  Father    · Family history of Acute Myocardial Infarction (V17 3)  Sister    · Family history of Depression   · Family history of Depression    Social History    · Being A Social Drinker   · History of Current Every Day Smoker (305 1)   · Former smoker (V15 82) (U13 414)   · Full-time employment   · Marital History - Single   · No drug use   · Non-smoker (V49 89) (Z78 9)   · Occasional alcohol use   · Single    Current Meds   1  Amlodipine Besy-Benazepril HCl - 10-40 MG Oral Capsule; take one capsule by mouth   daily; Therapy: 19Apr2013 to (Evaluate:16Apr2017)  Requested for: 38RSM4308; Last   Rx:18Oct2016 Ordered   2  Aspirin 81 MG TABS; TAKE 1 TABLET DAILY; Therapy: 68UXG1732 to (Evaluate:07Jun2013) Recorded   3  Belsomra 10 MG Oral Tablet; take 1 tablet at  bedtime as needed for insomnia; Therapy: 10GAW2674 to (Evaluate:06Oct2016) Recorded   4  Ipratropium-Albuterol 0 5-2 5 (3) MG/3ML Inhalation Solution; Inh q 8 hrs prn   SOB/wheeze in Neb; Therapy: 36YHB4259 to (Last Rx:03Mar2015)  Requested for: 07YIW1331 Ordered   5  Omeprazole 40 MG Oral Capsule Delayed Release; TAKE 1 CAPSULE EVERY DAY; Therapy: 59PJG8015 to (Evaluate:12Jun2017)  Requested for: 20CFR1317; Last   Rx:60Rvq9470 Ordered   6  Simvastatin 40 MG Oral Tablet; take 1 tablet every day; Therapy: 21CNP5584 to (Evaluate:14Dec2016)  Requested for: 26Oqt3217; Last   Rx:17Ypi9577 Ordered   7  Triamcinolone Acetonide 0 1 % Mouth/Throat Paste; Apply to sore three times a day; Therapy: 96GZF6782 to (Last Rx:07Mar2016)  Requested for: 61YJO7801 Ordered   8  Zostavax 28684 UNT/0 65ML Subcutaneous Solution Reconstituted; administer as   directed; Therapy: 76ALU2145 to (Last Rx:07Dec2015) Ordered    Allergies    1  No Known Drug Allergies    Vitals   Recorded: 42ASV5511 01:37PM   Temperature 98 1 F, Tympanic   Heart Rate 68, L Radial   Pulse Quality Regular   Systolic 727, LUE, Sitting   Diastolic 70, LUE, Sitting   Height 5 ft 8 in   Weight 246 lb 12 8 oz   BMI Calculated 37 53   BSA Calculated 2 24     Physical Exam    Constitutional   General appearance: Abnormal   obese  Eyes   Conjunctiva and lids: No erythema, swelling or discharge  Ears, Nose, Mouth, and Throat   External inspection of ears and nose: Normal     Otoscopic examination: Tympanic membranes translucent with normal light reflex  Canals patent without erythema  Hearing: Normal     Nasal mucosa, septum, and turbinates: Normal without edema or erythema  Lips, teeth, and gums: Normal, good dentition  Oropharynx: Normal with no erythema, edema, exudate or lesions  Neck   Neck: Supple, symmetric, trachea midline, no masses  Thyroid: Normal, no thyromegaly  Pulmonary   Respiratory effort: No increased work of breathing or signs of respiratory distress  Auscultation of lungs: Clear to auscultation  Cardiovascular   Auscultation of heart: Normal rate and rhythm, normal S1 and S2, no murmurs  Carotid pulses: 2+ bilaterally  Abdominal aorta: Normal     Peripheral vascular exam: Normal     Examination of extremities for edema and/or varicosities: Normal     Abdomen   Abdomen: Non-tender, no masses  Liver and spleen: No hepatomegaly or splenomegaly  Lymphatic   Palpation of lymph nodes in neck: No lymphadenopathy  Musculoskeletal   Gait and station: Normal     Inspection/palpation of digits and nails: Normal without clubbing or cyanosis  Inspection/palpation of joints, bones, and muscles: Normal     Stability: Normal     Muscle strength/tone: Normal     Skin   Skin and subcutaneous tissue: Normal without rashes or lesions  Psychiatric   Judgment and insight: Normal     Orientation to person, place and time: Normal     Recent and remote memory: Intact  Mood and affect: Normal        Results/Data  PHQ-9 Adult Depression Screening 54SLG0590 01:39PM User, [facility]     Test Name Result Flag Reference   PHQ-9 Adult Depression Score 12     Over the last two weeks, how often have you been bothered by any of the following problems? Little interest or pleasure in doing things: More than half the days - 2  Feeling down, depressed, or hopeless: Several days - 1  Trouble falling or staying asleep, or sleeping too much: Nearly every day - 3  Feeling tired or having little energy: More than half the days - 2  Poor appetite or over eating: Nearly every day - 3  Feeling bad about yourself - or that you are a failure or have let yourself or your family down: Not at all - 0  Trouble concentrating on things, such as reading the newspaper or watching television: Several days - 1  Moving or speaking so slowly that other people could have noticed   Or the opposite -  being so fidgety or restless that you have been moving around a lot more than usual: Not at all - 0  Thoughts that you would be better off dead, or of hurting yourself in some way: Not at all - 0   PHQ-9 Adult Depression Screening Positive     PHQ-9 Difficulty Level Somewhat difficult     PHQ-9 Severity Moderate Depression         Health Management  Encounter for screening colonoscopy   COLONOSCOPY; every 5 years; Last 23Aug2012; Next Due: 23Aug2017;  Active    Future Appointments    Date/Time Provider Specialty Site   01/09/2017 08:00 AM Harley Love MD Family [facility] Damaris Gonzalez MD   04/10/2017 04:00 PM Harley Love MD Family Medicine Damaris Gonzalez MD     Signatures   Electronically signed by : Aileen Tavares MD; Dec 11 2016 12:21PM EST                       (Author)

## 2018-01-16 NOTE — RESULT NOTES
Discussion/Summary    Excellent cholesterol, sugar, liver and kidney  pt aware ems 11/22/2017         Verified Results  (1) CBC/ PLT (NO DIFF) 91WCR7566 09:48AM [employer]     Test Name Result Flag Reference   WBC 6 4 x10E3/uL  3 4-10 8   RBC 4 49 x10E6/uL  4 14-5 80   Hemoglobin 13 7 g/dL  12 6-17 7   **Effective December 4, 2017 the reference interval**                   for Hemoglobin MALES only will be changing to:                                          Males 13-15 years: 12 6 - 17 7                                         Males   >15 years: 13 0 - 17 7   Hematocrit 40 2 %  37 5-51 0   MCV 90 fL  79-97   MCH 30 5 pg  26 6-33 0   MCHC 34 1 g/dL  31 5-35 7   RDW 13 4 %  12 3-15 4   Platelets 452 P71H9/KM  150-379     (1) COMPREHENSIVE METABOLIC PANEL 82JNU0482 25:78MI [employer]     Test Name Result Flag Reference   Glucose, Serum 124 mg/dL H 65-99   BUN 21 mg/dL  6-24   Creatinine, Serum 1 09 mg/dL  0 76-1 27   BUN/Creatinine Ratio 19  9-20   Sodium, Serum 139 mmol/L  134-144   Potassium, Serum 4 3 mmol/L  3 5-5 2   Chloride, Serum 101 mmol/L     Carbon Dioxide, Total 24 mmol/L  18-29   Calcium, Serum 9 6 mg/dL  8 7-10 2   Protein, Total, Serum 7 2 g/dL  6 0-8 5   Albumin, Serum 4 4 g/dL  3 5-5 5   Globulin, Total 2 8 g/dL  1 5-4 5   A/G Ratio 1 6  1 2-2 2   Bilirubin, Total 0 4 mg/dL  0 0-1 2   Alkaline Phosphatase, S 63 IU/L     AST (SGOT) 19 IU/L  0-40   ALT (SGPT) 18 IU/L  0-44   eGFR If NonAfricn Am 74 mL/min/1 73  >59   eGFR If Africn Am 85 mL/min/1 73  >59     (1) HEMOGLOBIN A1C 39QPD1479 09:48AM [employer]     Test Name Result Flag Reference   Hemoglobin A1c 5 9 % H 4 8-5 6   Pre-diabetes: 5 7 - 6 4           Diabetes: >6 4           Glycemic control for adults with diabetes: <7 0     (1) LIPID PANEL, FASTING 11TKI8558 09:48AM [facility]va[facility] Santos     Test Name Result Flag Reference   Cholesterol, Total 146 mg/dL  100-199   Triglycerides 117 mg/dL  0-149   HDL Cholesterol 43 mg/dL  >39   VLDL Cholesterol Poli 23 mg/dL  5-40   LDL Cholesterol Calc 80 mg/dL  0-99   T  Chol/HDL Ratio 3 4 ratio units  0 0-5 0   T  Chol/HDL Ratio                                                             Men  Women                                               1/2 Avg  Risk  3 4    3 3                                                   Avg Risk  5 0    4 4                                                2X Avg  Risk  9 6    7 1                                                3X Avg  Risk 23 4   11 0

## 2018-01-16 NOTE — RESULT NOTES
Verified Results  * XR CHEST PA & LATERAL 65LMK2955 02:07PM Radha Tabor Order Number: [id number]     Test Name Result Flag Reference   XR CHEST PA & LATERAL (Report)     CHEST - DUAL ENERGY     INDICATION: Dyspnea  Shortness of breath  COMPARISON: July 9, 2014  VIEWS: PA (including soft tissue/bone algorithms) and lateral projections; 4 images     FINDINGS:        Cardiomediastinal silhouette appears unremarkable  The lungs are clear  No pneumothorax or pleural effusion  Visualized osseous structures appear within normal limits for the patient's age  IMPRESSION:     No active pulmonary disease         Workstation performed: [id number]     Signed by:   Arik Deleon MD   12/15/16       Discussion/Summary   CXR is normal

## 2018-01-16 NOTE — RESULT NOTES
Verified Results  ([facility]) Lyme Ab/Western Blot Reflex [id number] 12:00AM Clay Chiang     Test Name Result Flag Reference   Lyme IgG/IgM Ab <0 91 ISR  0 00-0 90   Negative         <0 91                                                 Equivocal  0 91 - 1 09                                                 Positive         >1 09   Lyme Disease Ab, Quant, IgM <0 80 index  0 00-0 79   Negative         <0 80                                                 Equivocal  0 80 - 1 19                                                 Positive         >1 19                  IgM levels may peak at 3-6 weeks post infection, then                  gradually decline         Discussion/Summary    please notify pt that his Lyme test was negative  PATIENT AWARE1       1 Amended By: Jhonatan Pathak; Jan 21 2016 4:07 PM EST

## 2018-01-16 NOTE — MISCELLANEOUS
Message   Recorded as Task   Date: 11/18/2016 02:50 PM, Created By: Maida Anderson   Task Name: Medical Complaint Callback   Assigned To: Maida Anderson   Regarding Patient: Nisha Miranda, Status: Active   Comment:    Maida Anderson - 18 Nov 2016 2:50 PM     TASK CREATED  Caller: Self; Medical Complaint; [phone] (Home); [phone] (Work)  FYI  - Patient called stating he has shortness of breath, chest pain and dizziness  It has been happening for months off and on, but now is happening almost daily  Patient was advised to go to the ED - Patient refused - he knows his body and when he should go to the ED - Patient just wants an appt with you  Offered numerous appts - Patients schedule and yours conflict until 85/20/46  Patient scheduled   Noah Callaway - 18 Nov 2016 3:07 PM     TASK REPLIED TO: Previously Assigned To Noah Callaway        Active Problems    1  Depression (311) (F32 9)   2  Diarrhea (787 91) (R19 7)   3  Diverticulosis of large intestine without perforation or abscess without bleeding (562 10)   (K57 30)   4  Encounter for screening colonoscopy (V76 51) (Z12 11)   5  Esophagitis, reflux (530 11) (K21 0)   6  Fatigue (780 79) (R53 83)   7  Herpes simplex infection (054 9) (B00 9)   8  Hyperglycemia (790 29) (R73 9)   9  Hyperlipidemia (272 4) (E78 5)   10  Hypertension (401 9) (I10)   11  Nasal folliculitis (797 0) (F21 4)   12  Non morbid obesity due to excess calories (278 00) (E66 09)   13  Obstructive sleep apnea (327 23) (G47 33)   14  Palpitations (785 1) (R00 2)   15  Persistent insomnia (307 42) (G47 00)   16  Plantar fasciitis (728 71) (M72 2)   17  Recurrent aphthous ulcer (528 2) (K12 0)   18  Skin rash (782 1) (R21)   19  Special screening for malignant neoplasm of prostate (V76 44) (Z12 5)    Current Meds   1  Amlodipine Besy-Benazepril HCl - 10-40 MG Oral Capsule; take one capsule by mouth   daily;    Therapy: 19Apr2013 to (Evaluate:16Apr2017) Requested for: 61OWC9821; Last   Rx:18Oct2016 Ordered   2  Aspirin 81 MG TABS; TAKE 1 TABLET DAILY; Therapy: 02SKM0764 to (Evaluate:07Jun2013) Recorded   3  Belsomra 10 MG Oral Tablet; take 1 tablet at  bedtime as needed for insomnia; Therapy: 34ESK3695 to (Evaluate:06Oct2016) Recorded   4  Ipratropium-Albuterol 0 5-2 5 (3) MG/3ML Inhalation Solution; Inh q 8 hrs prn   SOB/wheeze in Neb; Therapy: 67YZI7626 to (Last Rx:03Mar2015)  Requested for: 52DHU4923 Ordered   5  Omeprazole 40 MG Oral Capsule Delayed Release; TAKE 1 CAPSULE EVERY DAY; Therapy: 88MZD7251 to (Evaluate:12Jun2017)  Requested for: 64RJJ6841; Last   Rx:14Nov2016 Ordered   6  Simvastatin 40 MG Oral Tablet; take 1 tablet every day; Therapy: 27JDE8177 to (Evaluate:56Tft6724)  Requested for: 45Ebk6923; Last   Rx:38Tmg3975 Ordered   7  Triamcinolone Acetonide 0 1 % Mouth/Throat Paste; Apply to sore three times a day; Therapy: 45YCU4044 to (Last Rx:07Mar2016)  Requested for: 97CTI9420 Ordered   8  Zostavax 26615 UNT/0 65ML Subcutaneous Solution Reconstituted (Zostavax 01779   UNT/0 65ML Subcutaneous Solution Reconstituted); administer as directed; Therapy: 51KET3084 to (Last Rx:75Fot0415) Ordered    Allergies    1   No Known Drug Allergies    Signatures   Electronically signed by : Darin Bray, ; Nov 18 2016  4:33PM EST                       (Author)

## 2018-01-23 VITALS
BODY MASS INDEX: 34.04 KG/M2 | HEART RATE: 82 BPM | DIASTOLIC BLOOD PRESSURE: 76 MMHG | HEIGHT: 69 IN | SYSTOLIC BLOOD PRESSURE: 130 MMHG | WEIGHT: 229.8 LBS | TEMPERATURE: 98.8 F

## 2018-01-26 DIAGNOSIS — I10 ESSENTIAL HYPERTENSION: Primary | ICD-10-CM

## 2018-01-26 RX ORDER — METOPROLOL SUCCINATE 25 MG/1
25 TABLET, EXTENDED RELEASE ORAL 2 TIMES DAILY
Qty: 60 TABLET | Refills: 5 | Status: SHIPPED | OUTPATIENT
Start: 2018-01-26 | End: 2018-07-06 | Stop reason: SDUPTHER

## 2018-01-26 RX ORDER — METOPROLOL SUCCINATE 25 MG/1
1 TABLET, EXTENDED RELEASE ORAL 2 TIMES DAILY
COMMUNITY
Start: 2018-01-09 | End: 2018-01-26 | Stop reason: SDUPTHER

## 2018-02-26 NOTE — MISCELLANEOUS
Message   Recorded as Task   Date: 01/12/2018 01:45 PM, Created By: Yury Jade   Task Name: Medical Complaint Callback   Assigned To: [facility]   Regarding Patient: Niki Tony, Status: Active   Comment:    Kaitlin Morelos - 12 Jan 2018 1:45 PM     TASK CREATED  Caller: Self; Medical Complaint; [phone] (Home); [phone] x1 (Work)  Pt is going to [place] in a month, for a month  His blood pressure is still up even with the new med  That started 3 days ago  He has tried checking all times of the day  150-160  PCB   Dora Hess - 12 Jan 2018 1:46 PM     TASK REPLIED TO: Previously Assigned To Noah Callaway                      Did you want to adjust BP meds? Noah Callaway - 12 Jan 2018 2:09 PM     TASK REPLIED TO: Previously Assigned To Noah Callaway  Increase MetoprololER 25 mg to twice a day   Dora Hess - 12 Jan 2018 2:17 PM     TASK REPLIED TO: Previously Assigned To [facility] to call back   Dora Hess - 12 Jan 2018 3:01 PM     TASK EDITED  pt aware        Active Problems    1  BPH with obstruction/lower urinary tract symptoms (600 01,599 69) (N40 1,N13 8)   2  Chronic pain of right knee (091 15,401 97) (M25 561,G89 29)   3  Depression (311) (F32 9)   4  Diabetes mellitus type 2, uncomplicated (721 92) (N72 9)   5  Diverticulosis of large intestine without perforation or abscess without bleeding (562 10)   (K57 30)   6  Dyspnea, unspecified type (786 09) (R06 00)   7  Encounter for screening colonoscopy (V76 51) (Z12 11)   8  Esophagitis, reflux (530 11) (K21 0)   9  Hyperlipidemia (272 4) (E78 5)   10  Hypertension (401 9) (I10)   11  Non morbid obesity due to excess calories (278 00) (E66 09)   12  Obstructive sleep apnea (327 23) (G47 33)   13  Palpitations (785 1) (R00 2)   14  Persistent insomnia (307 42) (G47 00)    Current Meds   1   Amlodipine Besy-Benazepril HCl - 10-40 MG Oral Capsule; take one capsule by mouth daily; Therapy: 98HSW8794 to (Zheng Sepulveda)  Requested for: 34SCJ3505 Recorded   2  Aspirin 81 MG TABS; TAKE 1 TABLET DAILY; Therapy: 14ISJ3697 to (Evaluate:07Jun2013) Recorded   3  Valarie Contour Next Test In [employer]; TEST TWICE DAILY AS DIRECTED; Therapy: 56FAK6359 to (Evaluate:14May2017); Last Rx:26Mar2017 Ordered   4  Valarie Contour Normal In Vitro Liquid; USE AS DIRECTED; Therapy: 22OUL3319 to (Last Rx:28Dec2017)  Requested for: 28Dec2017 Ordered   5  Famotidine 20 MG Oral Tablet; take one tablet by mouth twice daily; Therapy: 95PGC7369 to (Evaluate:27Mar2018)  Requested for: 65QGH7145; Last   Rx:87Zga3263 Ordered   6  Ipratropium-Albuterol 0 5-2 5 (3) MG/3ML Inhalation Solution; Inh q 8 hrs prn   SOB/wheeze in Neb; Therapy: 23ISL6722 to (Last Rx:03Mar2015)  Requested for: 32IVR5310 Ordered   7  Lancets Miscellaneous; TEST TWICE A DAY; Therapy: 59WBA6348 to (Last Rx:10Feb2017)  Requested for: 65YVN2303 Ordered   8  Metoprolol Succinate ER 25 MG Oral Tablet Extended Release 24 Hour; TAKE 1 TABLET   TWICE DAILY; Therapy: 66KDE6720 to (Evaluate:11Jul2018)  Requested for: 47AJN8623 Recorded   9  Omeprazole 40 MG Oral Capsule Delayed Release; TAKE 1 CAPSULE EVERY DAY; Therapy: 19EQO4327 to (Evaluate:26Jun2018)  Requested for: 30CTF4368; Last   Rx:28Nov2017 Ordered   10  Simvastatin 40 MG Oral Tablet; take 1 tablet every day; Therapy: 89SSO7322 to (Vane Meyer)  Requested for: 64MGS2843; Last    Rx:26Jul2017 Ordered   11  Tamsulosin HCl - 0 4 MG Oral Capsule; TAKE 1 CAPSULE Daily; Therapy: 71KVK5700 to (Zheng Sepulveda)  Requested for: 47SMU2247; Last    Rx:09Jan2018 Ordered   12  Triamcinolone Acetonide 0 1 % Mouth/Throat Paste; Apply to sore three times a day; Therapy: 39CWJ9344 to (Last Rx:07Mar2016)  Requested for: 60TOG4077 Ordered   13  Zostavax 53366 UNT/0 65ML Subcutaneous Solution Reconstituted; administer as    directed;     Therapy: 64XSH6105 to (Last Rx:29Yxo9327) Ordered    Allergies    1   No Known Drug Allergies    Signatures   Electronically signed by : Iris Muhammad MD; Jan 13 2018  1:23PM EST                       (Co-author)

## 2018-03-11 DIAGNOSIS — I10 ESSENTIAL HYPERTENSION: Primary | ICD-10-CM

## 2018-03-11 RX ORDER — AMLODIPINE BESYLATE AND BENAZEPRIL HYDROCHLORIDE 10; 40 MG/1; MG/1
CAPSULE ORAL
Qty: 30 CAPSULE | Refills: 0 | Status: SHIPPED | OUTPATIENT
Start: 2018-03-11 | End: 2018-04-19 | Stop reason: SDUPTHER

## 2018-04-07 LAB
ALBUMIN SERPL-MCNC: 4.5 G/DL (ref 3.6–4.8)
ALBUMIN/GLOB SERPL: 1.4 {RATIO} (ref 1.2–2.2)
ALP SERPL-CCNC: 66 IU/L (ref 39–117)
ALT SERPL-CCNC: 22 IU/L (ref 0–44)
AST SERPL-CCNC: 22 IU/L (ref 0–40)
BILIRUB SERPL-MCNC: 0.5 MG/DL (ref 0–1.2)
BUN SERPL-MCNC: 20 MG/DL (ref 8–27)
BUN/CREAT SERPL: 17 (ref 10–24)
CALCIUM SERPL-MCNC: 9.6 MG/DL (ref 8.6–10.2)
CHLORIDE SERPL-SCNC: 99 MMOL/L (ref 96–106)
CHOLEST SERPL-MCNC: 173 MG/DL (ref 100–199)
CHOLEST/HDLC SERPL: 3.8 RATIO (ref 0–5)
CO2 SERPL-SCNC: 24 MMOL/L (ref 18–29)
CREAT SERPL-MCNC: 1.21 MG/DL (ref 0.76–1.27)
GLOBULIN SER-MCNC: 3.3 G/DL (ref 1.5–4.5)
GLUCOSE SERPL-MCNC: 125 MG/DL (ref 65–99)
HBA1C MFR BLD: 6.1 % (ref 4.8–5.6)
HDLC SERPL-MCNC: 46 MG/DL
LDLC SERPL CALC-MCNC: 94 MG/DL (ref 0–99)
POTASSIUM SERPL-SCNC: 4.7 MMOL/L (ref 3.5–5.2)
PROT SERPL-MCNC: 7.8 G/DL (ref 6–8.5)
SL AMB EGFR AFRICAN AMERICAN: 75 ML/MIN/1.73
SL AMB EGFR NON AFRICAN AMERICAN: 65 ML/MIN/1.73
SL AMB VLDL CHOLESTEROL CALC: 33 MG/DL (ref 5–40)
SODIUM SERPL-SCNC: 141 MMOL/L (ref 134–144)
TRIGL SERPL-MCNC: 163 MG/DL (ref 0–149)
TSH SERPL DL<=0.005 MIU/L-ACNC: 1.96 UIU/ML (ref 0.45–4.5)

## 2018-04-09 PROBLEM — N40.1 BPH WITH OBSTRUCTION/LOWER URINARY TRACT SYMPTOMS: Status: ACTIVE | Noted: 2018-01-09

## 2018-04-09 PROBLEM — N13.8 BPH WITH OBSTRUCTION/LOWER URINARY TRACT SYMPTOMS: Status: ACTIVE | Noted: 2018-01-09

## 2018-04-10 ENCOUNTER — OFFICE VISIT (OUTPATIENT)
Dept: FAMILY MEDICINE CLINIC | Facility: HOSPITAL | Age: 58
End: 2018-04-10
Payer: COMMERCIAL

## 2018-04-10 VITALS
TEMPERATURE: 98.2 F | HEIGHT: 70 IN | DIASTOLIC BLOOD PRESSURE: 64 MMHG | SYSTOLIC BLOOD PRESSURE: 138 MMHG | WEIGHT: 237 LBS | BODY MASS INDEX: 33.93 KG/M2 | HEART RATE: 72 BPM

## 2018-04-10 DIAGNOSIS — E78.2 MIXED HYPERLIPIDEMIA: ICD-10-CM

## 2018-04-10 DIAGNOSIS — M79.10 MYALGIA DUE TO STATIN: ICD-10-CM

## 2018-04-10 DIAGNOSIS — E11.9 TYPE 2 DIABETES MELLITUS WITHOUT COMPLICATION, WITHOUT LONG-TERM CURRENT USE OF INSULIN (HCC): ICD-10-CM

## 2018-04-10 DIAGNOSIS — R00.2 PALPITATIONS: ICD-10-CM

## 2018-04-10 DIAGNOSIS — T46.6X5A MYALGIA DUE TO STATIN: ICD-10-CM

## 2018-04-10 DIAGNOSIS — E66.09 NON MORBID OBESITY DUE TO EXCESS CALORIES: ICD-10-CM

## 2018-04-10 DIAGNOSIS — I10 ESSENTIAL HYPERTENSION: Primary | ICD-10-CM

## 2018-04-10 PROBLEM — M25.561 CHRONIC PAIN OF RIGHT KNEE: Status: ACTIVE | Noted: 2017-11-28

## 2018-04-10 PROBLEM — G89.29 CHRONIC PAIN OF RIGHT KNEE: Status: ACTIVE | Noted: 2017-11-28

## 2018-04-10 PROCEDURE — 99214 OFFICE O/P EST MOD 30 MIN: CPT | Performed by: FAMILY MEDICINE

## 2018-04-10 NOTE — PROGRESS NOTES
Assessment/Plan:         Diagnoses and all orders for this visit:    Essential hypertension  Comments:  Continue Amlodipine-Benazepril 10-40 and increase Metoprolol succ 25 mg to three a day  Continue home BP checks    Type 2 diabetes mellitus without complication, without long-term current use of insulin ([facility])  Comments:  Excellent stability with A1c of 6 1 on diet control    Mixed hyperlipidemia  Comments:  Excellent lipid profile on Simvastatin    Myalgia due to statin  Comments:  Hold Simvastatin for 2 weeks to see if symptoms improve,    Non morbid obesity due to excess calories    Palpitations          Subjective:      Patient ID: Maurice Adams is a 59 y o  male  Hasnt felt well since last visit because of hip pains preventing walking  BP's are all in 140-150's  Glucose testing is up to 150's  Had swelling of both feet and ankles while in [place]  Has tinnitus in both ears  Frustrated with himself for regaining weight that he lost   He is worried about heart disease like his father  The following portions of the patient's history were reviewed and updated as appropriate: allergies, current medications, past family history, past medical history, past social history, past surgical history and problem list     Review of Systems   Constitutional: Positive for unexpected weight change  HENT: Positive for tinnitus  Negative for congestion, sinus pain and sinus pressure  Eyes: Negative for pain  Respiratory: Positive for shortness of breath  Negative for cough, chest tightness and wheezing  Cardiovascular: Positive for palpitations and leg swelling  Negative for chest pain  Gastrointestinal: Negative for abdominal pain  Genitourinary: Negative for difficulty urinating and testicular pain  Musculoskeletal: Positive for arthralgias, gait problem and myalgias  Neurological: Negative for tremors, weakness and headaches  Hematological: Negative      Psychiatric/Behavioral: Negative for sleep disturbance  The patient is nervous/anxious  Objective:      /64 (BP Location: Left arm, Patient Position: Sitting, Cuff Size: Large)   Pulse 72   Temp 98 2 °F (36 8 °C) (Tympanic)   Ht 5' 10" (1 778 m)   Wt 108 kg (237 lb)   BMI 34 01 kg/m²          Physical Exam   Constitutional: He is oriented to person, place, and time  Eyes: Conjunctivae are normal    Neck: Neck supple  Cardiovascular: Normal rate, regular rhythm, normal heart sounds and intact distal pulses  No murmur heard  Pulmonary/Chest: Effort normal and breath sounds normal    Musculoskeletal: Normal range of motion  Neurological: He is alert and oriented to person, place, and time  Skin: No rash noted  Psychiatric: He has a normal mood and affect  His behavior is normal  Judgment and thought content normal    Nursing note and vitals reviewed

## 2018-04-11 DIAGNOSIS — E78.2 MIXED HYPERLIPIDEMIA: Primary | ICD-10-CM

## 2018-04-11 RX ORDER — SIMVASTATIN 40 MG
TABLET ORAL
Qty: 30 TABLET | Refills: 0 | Status: SHIPPED | OUTPATIENT
Start: 2018-04-11 | End: 2018-05-12 | Stop reason: SDUPTHER

## 2018-04-19 DIAGNOSIS — I10 ESSENTIAL HYPERTENSION: ICD-10-CM

## 2018-04-19 RX ORDER — AMLODIPINE BESYLATE AND BENAZEPRIL HYDROCHLORIDE 10; 40 MG/1; MG/1
CAPSULE ORAL
Qty: 30 CAPSULE | Refills: 5 | Status: SHIPPED | OUTPATIENT
Start: 2018-04-19 | End: 2018-10-03 | Stop reason: SDUPTHER

## 2018-05-10 ENCOUNTER — TELEPHONE (OUTPATIENT)
Dept: FAMILY MEDICINE CLINIC | Facility: HOSPITAL | Age: 58
End: 2018-05-10

## 2018-05-10 NOTE — TELEPHONE ENCOUNTER
Needs new Metoprolol script sent in, is out due to the change in dosage   Also, he does not think it is working well, his BP's are ranging in the 140/150's

## 2018-05-10 NOTE — TELEPHONE ENCOUNTER
He called later today and was out of his meds  So I did call 1 month in for him  He also said he could wait till next month when he has appt if you want him to continue the same until then  Please advise

## 2018-05-12 DIAGNOSIS — E78.2 MIXED HYPERLIPIDEMIA: ICD-10-CM

## 2018-05-12 RX ORDER — SIMVASTATIN 40 MG
TABLET ORAL
Qty: 30 TABLET | Refills: 5 | Status: SHIPPED | OUTPATIENT
Start: 2018-05-12 | End: 2018-12-06 | Stop reason: SDUPTHER

## 2018-06-12 RX ORDER — LANCETS 30 GAUGE
EACH MISCELLANEOUS 2 TIMES DAILY
COMMUNITY
Start: 2017-02-10 | End: 2021-12-09 | Stop reason: SDUPTHER

## 2018-06-12 RX ORDER — TRIAMCINOLONE ACETONIDE 0.1 %
PASTE (GRAM) DENTAL
COMMUNITY
Start: 2016-03-07 | End: 2019-01-24 | Stop reason: ALTCHOICE

## 2018-06-12 RX ORDER — TAMSULOSIN HYDROCHLORIDE 0.4 MG/1
1 CAPSULE ORAL DAILY
COMMUNITY
Start: 2018-01-09 | End: 2019-01-24 | Stop reason: ALTCHOICE

## 2018-06-12 RX ORDER — FAMOTIDINE 20 MG/1
1 TABLET, FILM COATED ORAL 2 TIMES DAILY
COMMUNITY
Start: 2017-02-24 | End: 2018-10-30 | Stop reason: ALTCHOICE

## 2018-06-13 ENCOUNTER — OFFICE VISIT (OUTPATIENT)
Dept: FAMILY MEDICINE CLINIC | Facility: HOSPITAL | Age: 58
End: 2018-06-13
Payer: COMMERCIAL

## 2018-06-13 VITALS
HEIGHT: 70 IN | TEMPERATURE: 98.5 F | HEART RATE: 68 BPM | BODY MASS INDEX: 34.16 KG/M2 | WEIGHT: 238.6 LBS | SYSTOLIC BLOOD PRESSURE: 116 MMHG | DIASTOLIC BLOOD PRESSURE: 62 MMHG

## 2018-06-13 DIAGNOSIS — I10 ESSENTIAL HYPERTENSION: Primary | ICD-10-CM

## 2018-06-13 DIAGNOSIS — E78.2 MIXED HYPERLIPIDEMIA: ICD-10-CM

## 2018-06-13 DIAGNOSIS — Z12.5 SCREENING FOR MALIGNANT NEOPLASM OF PROSTATE: ICD-10-CM

## 2018-06-13 DIAGNOSIS — E11.9 TYPE 2 DIABETES MELLITUS WITHOUT COMPLICATION, WITHOUT LONG-TERM CURRENT USE OF INSULIN (HCC): ICD-10-CM

## 2018-06-13 PROCEDURE — 99213 OFFICE O/P EST LOW 20 MIN: CPT | Performed by: FAMILY MEDICINE

## 2018-06-13 NOTE — PROGRESS NOTES
Assessment/Plan:         Diagnoses and all orders for this visit:    Essential hypertension  Comments:  Very good control on current medication combination  Unable to rely on current home BP cuff readings  Orders:  -     Comprehensive metabolic panel; Future  -     Comprehensive metabolic panel    Type 2 diabetes mellitus without complication, without long-term current use of insulin (HCC)  Comments:  Stable, continue carbohydrate avoidance  Orders:  -     HEMOGLOBIN A1C W/ EAG ESTIMATION; Future  -     HEMOGLOBIN A1C W/ EAG ESTIMATION    Mixed hyperlipidemia  Comments:  Good lipid control  Orders:  -     Lipid Panel with Direct LDL reflex; Future  -     Lipid Panel with Direct LDL reflex    Screening for malignant neoplasm of prostate  -     PSA Total (Reflex To Free); Future  -     PSA Total (Reflex To Free)          Subjective:      Patient ID: Keli Graham is a 59 y o  male  Feeling pretty well  141/75  On his own meter    Glucoses are going up as well  Highest was 238    Knees feel much better since going off and then back on the cholesterol med         The following portions of the patient's history were reviewed and updated as appropriate: allergies, current medications, past family history, past medical history, past social history, past surgical history and problem list     Review of Systems   Constitutional: Negative  HENT: Negative  Respiratory: Negative  Cardiovascular: Negative  Neurological: Negative  Psychiatric/Behavioral: Negative  Objective:      /62   Pulse 68   Temp 98 5 °F (36 9 °C) (Tympanic)   Ht 5' 10" (1 778 m)   Wt 108 kg (238 lb 9 6 oz)   BMI 34 24 kg/m²          Physical Exam   Constitutional: He appears well-developed and well-nourished  Neck: Neck supple  Cardiovascular: Normal rate and regular rhythm  Pulmonary/Chest: Effort normal and breath sounds normal    Musculoskeletal: He exhibits no edema     Psychiatric: He has a normal mood and affect  His behavior is normal  Judgment and thought content normal    Nursing note and vitals reviewed

## 2018-07-06 DIAGNOSIS — I10 ESSENTIAL HYPERTENSION: ICD-10-CM

## 2018-07-06 RX ORDER — METOPROLOL SUCCINATE 25 MG/1
TABLET, EXTENDED RELEASE ORAL
Qty: 90 TABLET | Refills: 1 | Status: SHIPPED | OUTPATIENT
Start: 2018-07-06 | End: 2018-09-05 | Stop reason: SDUPTHER

## 2018-07-07 DIAGNOSIS — K21.00 GASTROESOPHAGEAL REFLUX DISEASE WITH ESOPHAGITIS: Primary | ICD-10-CM

## 2018-07-07 RX ORDER — OMEPRAZOLE 40 MG/1
CAPSULE, DELAYED RELEASE ORAL
Qty: 30 CAPSULE | Refills: 5 | Status: SHIPPED | OUTPATIENT
Start: 2018-07-07 | End: 2019-01-08 | Stop reason: SDUPTHER

## 2018-09-05 DIAGNOSIS — I10 ESSENTIAL HYPERTENSION: ICD-10-CM

## 2018-09-05 RX ORDER — METOPROLOL SUCCINATE 25 MG/1
TABLET, EXTENDED RELEASE ORAL
Qty: 90 TABLET | Refills: 1 | Status: SHIPPED | OUTPATIENT
Start: 2018-09-05 | End: 2019-01-10 | Stop reason: SDUPTHER

## 2018-09-10 ENCOUNTER — TELEPHONE (OUTPATIENT)
Dept: FAMILY MEDICINE CLINIC | Facility: HOSPITAL | Age: 58
End: 2018-09-10

## 2018-09-10 DIAGNOSIS — Z11.59 ENCOUNTER FOR HEPATITIS C SCREENING TEST FOR LOW RISK PATIENT: Primary | ICD-10-CM

## 2018-09-10 NOTE — TELEPHONE ENCOUNTER
Would like Hep C blood test done prior to appt next week   Has BW slip for other BW, just needs Hep C order

## 2018-09-12 LAB — HCV AB S/CO SERPL IA: <0.1 S/CO RATIO (ref 0–0.9)

## 2018-09-13 LAB
ALBUMIN SERPL-MCNC: 4.6 G/DL (ref 3.6–4.8)
ALBUMIN/GLOB SERPL: 1.7 {RATIO} (ref 1.2–2.2)
ALP SERPL-CCNC: 61 IU/L (ref 39–117)
ALT SERPL-CCNC: 19 IU/L (ref 0–44)
AST SERPL-CCNC: 18 IU/L (ref 0–40)
BILIRUB SERPL-MCNC: 0.4 MG/DL (ref 0–1.2)
BUN SERPL-MCNC: 25 MG/DL (ref 8–27)
BUN/CREAT SERPL: 20 (ref 10–24)
CALCIUM SERPL-MCNC: 9.6 MG/DL (ref 8.6–10.2)
CHLORIDE SERPL-SCNC: 103 MMOL/L (ref 96–106)
CHOLEST SERPL-MCNC: 178 MG/DL (ref 100–199)
CO2 SERPL-SCNC: 21 MMOL/L (ref 20–29)
CREAT SERPL-MCNC: 1.26 MG/DL (ref 0.76–1.27)
EST. AVERAGE GLUCOSE BLD GHB EST-MCNC: 131 MG/DL
GLOBULIN SER-MCNC: 2.7 G/DL (ref 1.5–4.5)
GLUCOSE SERPL-MCNC: 122 MG/DL (ref 65–99)
HBA1C MFR BLD: 6.2 % (ref 4.8–5.6)
HDLC SERPL-MCNC: 43 MG/DL
LDLC SERPL CALC-MCNC: 105 MG/DL (ref 0–99)
LDLC/HDLC SERPL: 2.4 RATIO (ref 0–3.6)
POTASSIUM SERPL-SCNC: 4.6 MMOL/L (ref 3.5–5.2)
PROT SERPL-MCNC: 7.3 G/DL (ref 6–8.5)
PSA SERPL-MCNC: 1.7 NG/ML (ref 0–4)
SL AMB EGFR AFRICAN AMERICAN: 71 ML/MIN/1.73
SL AMB EGFR NON AFRICAN AMERICAN: 62 ML/MIN/1.73
SL AMB REFLEX CRITERIA: NORMAL
SL AMB VLDL CHOLESTEROL CALC: 30 MG/DL (ref 5–40)
SODIUM SERPL-SCNC: 141 MMOL/L (ref 134–144)
TRIGL SERPL-MCNC: 151 MG/DL (ref 0–149)

## 2018-09-13 PROCEDURE — 3044F HG A1C LEVEL LT 7.0%: CPT | Performed by: FAMILY MEDICINE

## 2018-09-17 ENCOUNTER — OFFICE VISIT (OUTPATIENT)
Dept: FAMILY MEDICINE CLINIC | Facility: HOSPITAL | Age: 58
End: 2018-09-17
Payer: COMMERCIAL

## 2018-09-17 VITALS
DIASTOLIC BLOOD PRESSURE: 68 MMHG | HEART RATE: 46 BPM | BODY MASS INDEX: 35.46 KG/M2 | WEIGHT: 239.4 LBS | HEIGHT: 69 IN | SYSTOLIC BLOOD PRESSURE: 120 MMHG | TEMPERATURE: 96.8 F

## 2018-09-17 DIAGNOSIS — E11.9 TYPE 2 DIABETES MELLITUS WITHOUT COMPLICATION, WITHOUT LONG-TERM CURRENT USE OF INSULIN (HCC): ICD-10-CM

## 2018-09-17 DIAGNOSIS — I49.8 VENTRICULAR TRIGEMINY: ICD-10-CM

## 2018-09-17 DIAGNOSIS — R00.1 BRADYCARDIA: ICD-10-CM

## 2018-09-17 DIAGNOSIS — I10 ESSENTIAL HYPERTENSION: Primary | ICD-10-CM

## 2018-09-17 DIAGNOSIS — R00.2 HEART PALPITATIONS: ICD-10-CM

## 2018-09-17 DIAGNOSIS — E78.2 MIXED HYPERLIPIDEMIA: ICD-10-CM

## 2018-09-17 DIAGNOSIS — T50.905A BRADYCARDIA, DRUG INDUCED: ICD-10-CM

## 2018-09-17 DIAGNOSIS — R00.1 BRADYCARDIA, DRUG INDUCED: ICD-10-CM

## 2018-09-17 PROCEDURE — 3008F BODY MASS INDEX DOCD: CPT | Performed by: FAMILY MEDICINE

## 2018-09-17 PROCEDURE — 93000 ELECTROCARDIOGRAM COMPLETE: CPT | Performed by: FAMILY MEDICINE

## 2018-09-17 PROCEDURE — 99214 OFFICE O/P EST MOD 30 MIN: CPT | Performed by: FAMILY MEDICINE

## 2018-09-17 NOTE — PROGRESS NOTES
Assessment/Plan:         Diagnoses and all orders for this visit:    Essential hypertension  Comments:  Excellent control    Type 2 diabetes mellitus without complication, without long-term current use of insulin (HCC)  Comments:  A1c controlled at 6 2, urged further weight loss  Mixed hyperlipidemia  Comments:  Acceptable lipid profile    Bradycardia  Comments:  Metoprolol induced bradycardia  Decrease by one Metoprolol a day to total of one a m  and one p m  Orders:  -     POCT ECG    Heart palpitations    Ventricular trigeminy  -     Holter monitor - 24 hour; Future    Bradycardia, drug induced          Subjective:      Patient ID: Ailin Sy is a 59 y o  male  Three month followup  Noticing more palpitations in the past few days  Had episode of feeling like passing out when looking up yesterday  The following portions of the patient's history were reviewed and updated as appropriate: allergies, current medications, past family history, past medical history, past social history, past surgical history and problem list     Review of Systems   Constitutional: Negative for unexpected weight change  Eyes: Negative for visual disturbance  Respiratory: Negative for shortness of breath  Cardiovascular: Positive for palpitations  Negative for chest pain and leg swelling  Gastrointestinal: Negative for abdominal pain  Musculoskeletal: Negative for arthralgias  Neurological: Positive for dizziness  Hematological: Negative for adenopathy  Does not bruise/bleed easily  Psychiatric/Behavioral: The patient is not nervous/anxious  Objective:      /68   Pulse (!) 46   Temp (!) 96 8 °F (36 °C)   Ht 5' 9" (1 753 m)   Wt 109 kg (239 lb 6 4 oz)   BMI 35 35 kg/m²          Physical Exam   Constitutional: He is oriented to person, place, and time  He appears well-developed and well-nourished  Neck: No thyromegaly present  Cardiovascular: Intact distal pulses    Pulses are no weak pulses  No murmur heard  Pulses:       Dorsalis pedis pulses are 2+ on the right side, and 2+ on the left side  Posterior tibial pulses are 2+ on the right side, and 2+ on the left side  Pulmonary/Chest: Effort normal and breath sounds normal    Musculoskeletal: He exhibits no edema  Feet:   Right Foot:   Skin Integrity: Negative for ulcer, skin breakdown, erythema, warmth, callus or dry skin  Left Foot:   Skin Integrity: Negative for ulcer, skin breakdown, erythema, warmth, callus or dry skin  Neurological: He is alert and oriented to person, place, and time  Psychiatric: He has a normal mood and affect  His behavior is normal  Judgment and thought content normal    Nursing note and vitals reviewed  Patient's shoes and socks removed  Right Foot/Ankle   Right Foot Inspection  Skin Exam: skin normal and skin intact no dry skin, no warmth, no callus, no erythema, no maceration, no abnormal color, no pre-ulcer, no ulcer and no callus                          Toe Exam: ROM and strength within normal limits  Sensory   Vibration: intact  Proprioception: intact   Monofilament testing: intact  Vascular  Capillary refills: < 3 seconds  The right DP pulse is 2+  The right PT pulse is 2+  Left Foot/Ankle  Left Foot Inspection  Skin Exam: skin normal and skin intactno dry skin, no warmth, no erythema, no maceration, normal color, no pre-ulcer, no ulcer and no callus                         Toe Exam: ROM and strength within normal limits                   Sensory   Vibration: intact  Proprioception: intact  Monofilament: intact  Vascular  Capillary refills: < 3 seconds  The left DP pulse is 2+  The left PT pulse is 2+  Assign Risk Category:  No deformity present; No loss of protective sensation;  No weak pulses       Risk: 0

## 2018-09-21 ENCOUNTER — HOSPITAL ENCOUNTER (OUTPATIENT)
Dept: NON INVASIVE DIAGNOSTICS | Facility: CLINIC | Age: 58
Discharge: HOME | End: 2018-09-21
Payer: COMMERCIAL

## 2018-09-21 DIAGNOSIS — I49.8 VENTRICULAR TRIGEMINY: ICD-10-CM

## 2018-09-21 PROCEDURE — 93226 XTRNL ECG REC<48 HR SCAN A/R: CPT

## 2018-09-21 PROCEDURE — 93225 XTRNL ECG REC<48 HRS REC: CPT

## 2018-09-26 PROCEDURE — 93227 XTRNL ECG REC<48 HR R&I: CPT | Performed by: INTERNAL MEDICINE

## 2018-09-28 DIAGNOSIS — I49.8 VENTRICULAR TRIGEMINY: ICD-10-CM

## 2018-09-28 DIAGNOSIS — I49.3 VPC (VENTRICULAR PREMATURE COMPLEX): Primary | ICD-10-CM

## 2018-09-28 PROBLEM — K57.30 DIVERTICULAR DISEASE OF COLON: Status: ACTIVE | Noted: 2018-09-28

## 2018-09-28 PROBLEM — Z86.010 HISTORY OF COLONIC POLYPS: Status: ACTIVE | Noted: 2018-09-28

## 2018-09-28 PROBLEM — K21.9 GASTROESOPHAGEAL REFLUX DISEASE WITHOUT ESOPHAGITIS: Status: ACTIVE | Noted: 2018-09-28

## 2018-09-28 PROBLEM — Z86.0100 HISTORY OF COLONIC POLYPS: Status: ACTIVE | Noted: 2018-09-28

## 2018-09-28 PROBLEM — K57.92 DIVERTICULITIS: Status: ACTIVE | Noted: 2018-09-28

## 2018-09-28 PROBLEM — Z12.11 COLON CANCER SCREENING: Status: ACTIVE | Noted: 2018-09-28

## 2018-09-28 PROBLEM — K62.5 RECTAL BLEEDING: Status: ACTIVE | Noted: 2018-09-28

## 2018-10-02 ENCOUNTER — TELEPHONE (OUTPATIENT)
Dept: FAMILY MEDICINE CLINIC | Facility: HOSPITAL | Age: 58
End: 2018-10-02

## 2018-10-02 NOTE — TELEPHONE ENCOUNTER
The extra beats are not related or affected by activity  OK to do what he regularly does and keep appts as scheduled

## 2018-10-02 NOTE — TELEPHONE ENCOUNTER
Advised patient to keep 10/8 appt with you - has concerns regarding the strenuous work that he is doing  Should he be doing light work until seen next week?

## 2018-10-02 NOTE — TELEPHONE ENCOUNTER
Pt is seeing a Dr Karuna [facility] on 11/30/18  He is doing very strenuous work and wants to know if he should be doing this  Or, should he get a sooner appt with a cardiologist   He is very worried  He has a f/u with Dr Stanley Fink on 10/8/2018  He wanted to cancel   I told him he should come in   1st try cell: [phone]   If no ans call: [phone]  PCB

## 2018-10-03 DIAGNOSIS — I10 ESSENTIAL HYPERTENSION: ICD-10-CM

## 2018-10-03 RX ORDER — AMLODIPINE BESYLATE AND BENAZEPRIL HYDROCHLORIDE 10; 40 MG/1; MG/1
CAPSULE ORAL
Qty: 30 CAPSULE | Refills: 5 | Status: SHIPPED | OUTPATIENT
Start: 2018-10-03 | End: 2019-04-14 | Stop reason: SDUPTHER

## 2018-10-08 ENCOUNTER — OFFICE VISIT (OUTPATIENT)
Dept: FAMILY MEDICINE CLINIC | Facility: HOSPITAL | Age: 58
End: 2018-10-08
Payer: COMMERCIAL

## 2018-10-08 VITALS
HEIGHT: 69 IN | SYSTOLIC BLOOD PRESSURE: 128 MMHG | WEIGHT: 240.8 LBS | TEMPERATURE: 98.1 F | BODY MASS INDEX: 35.66 KG/M2 | HEART RATE: 55 BPM | DIASTOLIC BLOOD PRESSURE: 64 MMHG

## 2018-10-08 DIAGNOSIS — R00.1 BRADYCARDIA: ICD-10-CM

## 2018-10-08 DIAGNOSIS — I10 ESSENTIAL HYPERTENSION: Primary | ICD-10-CM

## 2018-10-08 DIAGNOSIS — I49.3 SYMPTOMATIC PVCS: ICD-10-CM

## 2018-10-08 PROCEDURE — 1036F TOBACCO NON-USER: CPT | Performed by: FAMILY MEDICINE

## 2018-10-08 PROCEDURE — 99213 OFFICE O/P EST LOW 20 MIN: CPT | Performed by: FAMILY MEDICINE

## 2018-10-08 PROCEDURE — 3078F DIAST BP <80 MM HG: CPT | Performed by: FAMILY MEDICINE

## 2018-10-08 PROCEDURE — 3074F SYST BP LT 130 MM HG: CPT | Performed by: FAMILY MEDICINE

## 2018-10-08 NOTE — PROGRESS NOTES
Assessment/Plan:         Diagnoses and all orders for this visit:    Essential hypertension  Comments:  Excellent control  Symptomatic PVCs    Bradycardia  Comments:  Decrease Metoprolol to one daily as I think the rate limiting effect allows more PVC breakthrough  Other orders  -     AFLURIA QUADRIVALENT 0 5 ML DARLENE; TO BE ADMINISTERED BY PHARMACIST FOR IMMUNIZATION          Subjective:      Patient ID: Raji Powell is a 59 y o  male  Follow up last visit,    More aware of heartbeats and palpitations  Feels it more pronounced in the chest rather than just his pulse  Notes a slow beat at night  He actually has tried to be as active as possible even with some heavier activities and to some degree felt better when doing so  He realizes he needs to lose weight and will continue to try to do so  The following portions of the patient's history were reviewed and updated as appropriate: allergies, current medications, past family history, past medical history, past social history, past surgical history and problem list     Review of Systems   Constitutional: Negative for unexpected weight change  HENT: Negative for congestion  Respiratory: Negative for cough  Cardiovascular: Positive for palpitations  Negative for chest pain and leg swelling  Neurological: Negative for dizziness, weakness, light-headedness and headaches  Psychiatric/Behavioral: The patient is nervous/anxious  All other systems reviewed and are negative  Objective:      /64   Pulse 55   Temp 98 1 °F (36 7 °C)   Ht 5' 9" (1 753 m)   Wt 109 kg (240 lb 12 8 oz)   BMI 35 56 kg/m²          Physical Exam   Constitutional: He is oriented to person, place, and time  He appears well-developed and well-nourished  Cardiovascular: Normal rate and regular rhythm  Pulmonary/Chest: Effort normal and breath sounds normal    Neurological: He is oriented to person, place, and time     Psychiatric: He has a normal mood and affect  His behavior is normal  Judgment and thought content normal    Nursing note and vitals reviewed

## 2018-10-09 RX ORDER — INFLUENZA A VIRUS A/SINGAPORE/GP1908/2015 IVR-180A (H1N1) ANTIGEN (PROPIOLACTONE INACTIVATED), INFLUENZA A VIRUS A/HONG KONG/4801/2014 X-263B (H3N2) ANTIGEN (PROPIOLACTONE INACTIVATED), INFLUENZA B VIRUS B/BRISBANE/46/2015 ANTIGEN (PROPIOLACTONE INACTIVATED), AND INFLUENZA B VIRUS B/PHUKET/3073/2013 BVR-1B ANTIGEN (PROPIOLACTONE INACTIVATED) 15; 15; 15; 15 UG/.5ML; UG/.5ML; UG/.5ML; UG/.5ML
INJECTION, SUSPENSION INTRAMUSCULAR
Refills: 0 | COMMUNITY
Start: 2018-09-07 | End: 2019-01-09 | Stop reason: CLARIF

## 2018-10-25 ENCOUNTER — TELEPHONE (OUTPATIENT)
Dept: CARDIOLOGY CLINIC | Facility: CLINIC | Age: 58
End: 2018-10-25

## 2018-10-25 NOTE — TELEPHONE ENCOUNTER
Left VM for pt to obtain information/records if pt has seen prior cardiologist prior to upcoming np appt

## 2018-10-30 ENCOUNTER — OFFICE VISIT (OUTPATIENT)
Dept: CARDIOLOGY CLINIC | Facility: CLINIC | Age: 58
End: 2018-10-30
Payer: COMMERCIAL

## 2018-10-30 VITALS
DIASTOLIC BLOOD PRESSURE: 72 MMHG | HEIGHT: 69 IN | HEART RATE: 64 BPM | WEIGHT: 241 LBS | SYSTOLIC BLOOD PRESSURE: 132 MMHG | BODY MASS INDEX: 35.7 KG/M2

## 2018-10-30 DIAGNOSIS — I49.3 VPC (VENTRICULAR PREMATURE COMPLEX): ICD-10-CM

## 2018-10-30 DIAGNOSIS — R00.2 PALPITATIONS: ICD-10-CM

## 2018-10-30 DIAGNOSIS — I10 ESSENTIAL HYPERTENSION: Primary | ICD-10-CM

## 2018-10-30 DIAGNOSIS — I49.8 VENTRICULAR TRIGEMINY: ICD-10-CM

## 2018-10-30 PROCEDURE — 99244 OFF/OP CNSLTJ NEW/EST MOD 40: CPT | Performed by: INTERNAL MEDICINE

## 2018-10-30 NOTE — PROGRESS NOTES
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  Will check echocardiogram and stress test for further evaluation  I offered altering medical therapy although if LVEF is normal, this is not mandatory  Will see how he does on his stress test and echo and I wont make any changes today  2  HTN: His BP has been somewhat variable  Okay today  No changes  History of Present Illness   Physician Requesting Consult: No att  providers found  Reason for Consult / Principal Problem: PVCs  HPI: Teja Valencia is a 59y o  year old male who presents with longstanding history of PVCs and Hypertension  He has had symptomatic PVCs for a long time, however he has had an increase in frequency of palpitations and he is symptomatic on a daily basis  His Metoprolol dosing was decreased recently  He typically has symptoms when he is at rest  He is not symptomatic when he is active  He has no exertional chest pain or dyspnea  He has no other symptoms  He has no history of syncope  He has rare, mild dizziness  Review of Systems   Constitution: Negative  HENT: Negative  Eyes: Negative  Cardiovascular: Positive for irregular heartbeat and palpitations  Respiratory: Negative  Endocrine: Negative  Hematologic/Lymphatic: Negative  Skin: Negative  Musculoskeletal: Negative  Gastrointestinal: Negative  Genitourinary: Negative  Neurological: Negative  Psychiatric/Behavioral: Negative  Allergic/Immunologic: Negative          Historical Information   Past Medical History:   Diagnosis Date    Atrial premature complex     last assessed: 05/23/2012    Depression 7/9/2012    [facility]     Diverticulitis     GERD (gastroesophageal reflux disease)     Herpes simplex infection     resolved: 07/26/2017    Hyperlipidemia     Hypertension     Nicotine dependence     last assessed: 99/47/7277    Umbilical hernia     last assessed: 07/30/2014     Past Surgical History:   Procedure Laterality Date    BOWEL RESECTION      COLON SIGMOID RESECTION LAPAROSCOPIC      onset: 07/22/2014;  partial-diverticulitis    UMBILICAL HERNIA REPAIR      onset: 07/22/2014       Social History:  History   Alcohol Use    Yes     Comment: Social/occasional      History   Drug Use No     History   Smoking Status    Former Smoker   Smokeless Tobacco    Never Used     Comment: History of current every day smoker       Family History:   Family History   Problem Relation Age of Onset    Depression Mother     Heart attack Father         acute    Depression Sister     Depression Sister        Meds/Allergies   No Known Allergies    Current Outpatient Prescriptions:     AFLURIA QUADRIVALENT 0 5 ML DARLENE, TO BE ADMINISTERED BY PHARMACIST FOR IMMUNIZATION, Disp: , Rfl: 0    amLODIPine-benazepril (LOTREL) 10-40 MG per capsule, TAKE ONE CAPSULE BY MOUTH DAILY, Disp: 30 capsule, Rfl: 5    aspirin 81 MG tablet, Take 81 mg by mouth daily  , Disp: , Rfl:     Glucose Blood (JOHNSON CONTOUR NEXT TEST VI), by In Vitro route 2 (two) times a day, Disp: , Rfl:     Lancets MISC, by Does not apply route 2 (two) times a day, Disp: , Rfl:     metoprolol succinate (TOPROL-XL) 25 mg 24 hr tablet, TAKE 1 TABLET BY MOUTH EVERY MORNING TAKE 2 TABLETS BY MOUTH AT BEDTIME, Disp: 90 tablet, Rfl: 1    omeprazole (PriLOSEC) 40 MG capsule, TAKE ONE CAPSULE BY MOUTH EVERY DAY, Disp: 30 capsule, Rfl: 5    simvastatin (ZOCOR) 40 mg tablet, TAKE 1 TABLET EVERY DAY, Disp: 30 tablet, Rfl: 5    metFORMIN (GLUCOPHAGE) 500 mg tablet, Take 500 mg by mouth daily with breakfast, Disp: , Rfl:     tamsulosin (FLOMAX) 0 4 mg, Take 1 capsule by mouth daily, Disp: , Rfl:    triamcinolone (KENALOG) 0 1 % oral topical paste, Apply to the mouth or throat, Disp: , Rfl:     Vitals:   Pulse: 64  Blood Pressue: 132/72  Weight: 109 kg (241 lb)      Physical Exam   Constitutional: He is oriented to person, place, and time  No distress  HENT:   Mouth/Throat: No oropharyngeal exudate  Eyes: No scleral icterus  Neck: No JVD present  Cardiovascular: Normal rate and regular rhythm  No murmur heard  Pulmonary/Chest: Effort normal and breath sounds normal  No respiratory distress  He has no wheezes  He has no rales  Abdominal: Soft  Bowel sounds are normal  He exhibits no distension  There is no tenderness  There is no rebound  Musculoskeletal: He exhibits no edema  Neurological: He is alert and oriented to person, place, and time  Skin: Skin is warm and dry  He is not diaphoretic  Psychiatric: He has a normal mood and affect  His behavior is normal        [unfilled]    Invasive Devices          No matching active lines, drains, or airways          Lab Results   Component Value Date     11/21/2017     09/12/2018    CO2 21 09/12/2018    ANIONGAP 9 07/09/2014    BUN 25 09/12/2018    CREATININE 1 09 11/21/2017    GLUCOSE 124 (H) 11/21/2017    CALCIUM 9 6 11/21/2017    AST 19 11/21/2017    ALT 18 11/21/2017    ALKPHOS 63 11/21/2017    PROT 7 2 11/21/2017    BILITOT 0 4 11/21/2017     Lab Results   Component Value Date    WBC 6 4 11/21/2017    HGB 13 7 11/21/2017     11/21/2017     No components found for: TROP    Imaging:     EKG: NSR  PVCs  Counseling / Coordination of Care  Total floor / unit time spent today 45 minutes  Greater than 50% of total time was spent with the patient and / or family counseling and / or coordination of care  A description of the counseling / coordination of care

## 2018-11-14 DIAGNOSIS — E11.9 TYPE 2 DIABETES MELLITUS WITHOUT COMPLICATION, WITHOUT LONG-TERM CURRENT USE OF INSULIN (HCC): Primary | ICD-10-CM

## 2018-11-14 DIAGNOSIS — E11.9 DM TYPE 2 WITHOUT RETINOPATHY (HCC): Primary | ICD-10-CM

## 2018-11-16 DIAGNOSIS — E11.9 DM TYPE 2 WITHOUT RETINOPATHY (HCC): ICD-10-CM

## 2018-11-16 DIAGNOSIS — E11.9 TYPE 2 DIABETES MELLITUS WITHOUT COMPLICATION, WITHOUT LONG-TERM CURRENT USE OF INSULIN (HCC): Primary | ICD-10-CM

## 2018-11-28 ENCOUNTER — HOSPITAL ENCOUNTER (OUTPATIENT)
Dept: NON INVASIVE DIAGNOSTICS | Facility: CLINIC | Age: 58
Discharge: HOME | End: 2018-11-28
Payer: COMMERCIAL

## 2018-11-28 DIAGNOSIS — I49.3 VPC (VENTRICULAR PREMATURE COMPLEX): ICD-10-CM

## 2018-11-28 PROCEDURE — 93016 CV STRESS TEST SUPVJ ONLY: CPT | Performed by: INTERNAL MEDICINE

## 2018-11-28 PROCEDURE — 93306 TTE W/DOPPLER COMPLETE: CPT

## 2018-11-28 PROCEDURE — 93306 TTE W/DOPPLER COMPLETE: CPT | Performed by: INTERNAL MEDICINE

## 2018-11-28 PROCEDURE — 93017 CV STRESS TEST TRACING ONLY: CPT

## 2018-11-28 PROCEDURE — 93018 CV STRESS TEST I&R ONLY: CPT | Performed by: INTERNAL MEDICINE

## 2018-11-29 LAB
CHEST PAIN STATEMENT: NORMAL
MAX DIASTOLIC BP: 75 MMHG
MAX HEART RATE: 142 BPM
MAX PREDICTED HEART RATE: 160 BPM
MAX. SYSTOLIC BP: 209 MMHG
PROTOCOL NAME: NORMAL
TARGET HR FORMULA: NORMAL
TEST INDICATION: NORMAL
TIME IN EXERCISE PHASE: NORMAL

## 2018-12-06 DIAGNOSIS — E78.2 MIXED HYPERLIPIDEMIA: ICD-10-CM

## 2018-12-06 RX ORDER — SIMVASTATIN 40 MG
TABLET ORAL
Qty: 30 TABLET | Refills: 5 | Status: SHIPPED | OUTPATIENT
Start: 2018-12-06 | End: 2019-05-14 | Stop reason: SDUPTHER

## 2018-12-18 ENCOUNTER — TELEPHONE (OUTPATIENT)
Dept: FAMILY MEDICINE CLINIC | Facility: HOSPITAL | Age: 58
End: 2018-12-18

## 2018-12-18 NOTE — TELEPHONE ENCOUNTER
Patient has appt 1/9/19 - asking if he can get orders for labs to be done prior to appt        Specifically concerned about his a1c, that its creeping up    pls call back at [phone] - ok to leave message

## 2018-12-20 DIAGNOSIS — I10 ESSENTIAL HYPERTENSION: Primary | ICD-10-CM

## 2018-12-20 DIAGNOSIS — E11.9 TYPE 2 DIABETES MELLITUS WITHOUT COMPLICATION, WITHOUT LONG-TERM CURRENT USE OF INSULIN (HCC): ICD-10-CM

## 2018-12-20 DIAGNOSIS — E78.2 MIXED HYPERLIPIDEMIA: ICD-10-CM

## 2019-01-04 LAB
ALBUMIN SERPL-MCNC: 4.5 G/DL (ref 3.6–4.8)
ALBUMIN/GLOB SERPL: 1.6 {RATIO} (ref 1.2–2.2)
ALP SERPL-CCNC: 70 IU/L (ref 39–117)
ALT SERPL-CCNC: 19 IU/L (ref 0–44)
AST SERPL-CCNC: 17 IU/L (ref 0–40)
BASOPHILS # BLD AUTO: 0 X10E3/UL (ref 0–0.2)
BASOPHILS NFR BLD AUTO: 1 %
BILIRUB SERPL-MCNC: 0.4 MG/DL (ref 0–1.2)
BUN SERPL-MCNC: 21 MG/DL (ref 8–27)
BUN/CREAT SERPL: 16 (ref 10–24)
CALCIUM SERPL-MCNC: 9.5 MG/DL (ref 8.6–10.2)
CHLORIDE SERPL-SCNC: 101 MMOL/L (ref 96–106)
CHOLEST SERPL-MCNC: 178 MG/DL (ref 100–199)
CO2 SERPL-SCNC: 24 MMOL/L (ref 20–29)
CREAT SERPL-MCNC: 1.29 MG/DL (ref 0.76–1.27)
EOSINOPHIL # BLD AUTO: 0.3 X10E3/UL (ref 0–0.4)
EOSINOPHIL NFR BLD AUTO: 4 %
ERYTHROCYTE [DISTWIDTH] IN BLOOD BY AUTOMATED COUNT: 14 % (ref 12.3–15.4)
EST. AVERAGE GLUCOSE BLD GHB EST-MCNC: 137 MG/DL
GLOBULIN SER-MCNC: 2.9 G/DL (ref 1.5–4.5)
GLUCOSE SERPL-MCNC: 130 MG/DL (ref 65–99)
HBA1C MFR BLD: 6.4 % (ref 4.8–5.6)
HCT VFR BLD AUTO: 43 % (ref 37.5–51)
HDLC SERPL-MCNC: 47 MG/DL
HGB BLD-MCNC: 14.5 G/DL (ref 13–17.7)
IMM GRANULOCYTES # BLD: 0.1 X10E3/UL (ref 0–0.1)
IMM GRANULOCYTES NFR BLD: 1 %
LDLC SERPL CALC-MCNC: 98 MG/DL (ref 0–99)
LDLC/HDLC SERPL: 2.1 RATIO (ref 0–3.6)
LYMPHOCYTES # BLD AUTO: 1.9 X10E3/UL (ref 0.7–3.1)
LYMPHOCYTES NFR BLD AUTO: 26 %
MCH RBC QN AUTO: 30 PG (ref 26.6–33)
MCHC RBC AUTO-ENTMCNC: 33.7 G/DL (ref 31.5–35.7)
MCV RBC AUTO: 89 FL (ref 79–97)
MONOCYTES # BLD AUTO: 0.6 X10E3/UL (ref 0.1–0.9)
MONOCYTES NFR BLD AUTO: 7 %
NEUTROPHILS # BLD AUTO: 4.6 X10E3/UL (ref 1.4–7)
NEUTROPHILS NFR BLD AUTO: 61 %
PLATELET # BLD AUTO: 221 X10E3/UL (ref 150–379)
POTASSIUM SERPL-SCNC: 4.5 MMOL/L (ref 3.5–5.2)
PROT SERPL-MCNC: 7.4 G/DL (ref 6–8.5)
RBC # BLD AUTO: 4.84 X10E6/UL (ref 4.14–5.8)
SL AMB EGFR AFRICAN AMERICAN: 69 ML/MIN/1.73
SL AMB EGFR NON AFRICAN AMERICAN: 60 ML/MIN/1.73
SL AMB VLDL CHOLESTEROL CALC: 33 MG/DL (ref 5–40)
SODIUM SERPL-SCNC: 140 MMOL/L (ref 134–144)
TRIGL SERPL-MCNC: 166 MG/DL (ref 0–149)
TSH SERPL DL<=0.005 MIU/L-ACNC: 1.53 UIU/ML (ref 0.45–4.5)
WBC # BLD AUTO: 7.4 X10E3/UL (ref 3.4–10.8)

## 2019-01-08 DIAGNOSIS — K21.00 GASTROESOPHAGEAL REFLUX DISEASE WITH ESOPHAGITIS: ICD-10-CM

## 2019-01-08 RX ORDER — OMEPRAZOLE 40 MG/1
CAPSULE, DELAYED RELEASE ORAL
Qty: 30 CAPSULE | Refills: 5 | Status: SHIPPED | OUTPATIENT
Start: 2019-01-08 | End: 2019-06-12 | Stop reason: SDUPTHER

## 2019-01-09 ENCOUNTER — OFFICE VISIT (OUTPATIENT)
Dept: FAMILY MEDICINE CLINIC | Facility: HOSPITAL | Age: 59
End: 2019-01-09
Payer: COMMERCIAL

## 2019-01-09 VITALS
SYSTOLIC BLOOD PRESSURE: 138 MMHG | HEART RATE: 52 BPM | DIASTOLIC BLOOD PRESSURE: 76 MMHG | WEIGHT: 243.4 LBS | HEIGHT: 69 IN | TEMPERATURE: 97.3 F | BODY MASS INDEX: 36.05 KG/M2

## 2019-01-09 DIAGNOSIS — E66.01 CLASS 2 SEVERE OBESITY DUE TO EXCESS CALORIES WITH SERIOUS COMORBIDITY AND BODY MASS INDEX (BMI) OF 35.0 TO 35.9 IN ADULT (HCC): ICD-10-CM

## 2019-01-09 DIAGNOSIS — E78.2 MIXED HYPERLIPIDEMIA: ICD-10-CM

## 2019-01-09 DIAGNOSIS — R52 PAINFUL COUGH: ICD-10-CM

## 2019-01-09 DIAGNOSIS — G47.33 OBSTRUCTIVE SLEEP APNEA: ICD-10-CM

## 2019-01-09 DIAGNOSIS — I10 ESSENTIAL HYPERTENSION: ICD-10-CM

## 2019-01-09 DIAGNOSIS — E11.9 TYPE 2 DIABETES MELLITUS WITHOUT COMPLICATION, WITHOUT LONG-TERM CURRENT USE OF INSULIN (HCC): Primary | ICD-10-CM

## 2019-01-09 DIAGNOSIS — R05.8 PAINFUL COUGH: ICD-10-CM

## 2019-01-09 PROCEDURE — 99214 OFFICE O/P EST MOD 30 MIN: CPT | Performed by: FAMILY MEDICINE

## 2019-01-09 PROCEDURE — 3078F DIAST BP <80 MM HG: CPT | Performed by: FAMILY MEDICINE

## 2019-01-09 PROCEDURE — 3075F SYST BP GE 130 - 139MM HG: CPT | Performed by: FAMILY MEDICINE

## 2019-01-09 NOTE — PROGRESS NOTES
Assessment/Plan:         Diagnoses and all orders for this visit:    Type 2 diabetes mellitus without complication, without long-term current use of insulin (HCC)  -     HEMOGLOBIN A1C W/ EAG ESTIMATION; Future  -     HEMOGLOBIN A1C W/ EAG ESTIMATION    Essential hypertension  -     Comprehensive metabolic panel; Future  -     Comprehensive metabolic panel    Mixed hyperlipidemia    Class 2 severe obesity due to excess calories with serious comorbidity and body mass index (BMI) of 35 0 to 35 9 in adult [facility])    Obstructive sleep apnea    Painful cough  -     XR chest pa & lateral; Future          Subjective:      Patient ID: Buddy Chavira is a 59 y o  male  F/U multiple issues    Family members have been passing away  Mother had lung cancer that went to ribs  He has pain in L thoracic intercostal area  He also gets phlegm in a m  Stopped smoking and has concerns about possibility of lung cancer  Has tremor in L hand that is now becoming noticeable  The following portions of the patient's history were reviewed and updated as appropriate: allergies, current medications, past family history, past medical history, past social history, past surgical history and problem list     Review of Systems   HENT: Negative  Respiratory: Negative  Cardiovascular: Negative  Gastrointestinal: Positive for abdominal distention  Genitourinary: Negative  Musculoskeletal: Positive for arthralgias  Neurological: Negative  All other systems reviewed and are negative  Objective:      /76   Pulse (!) 52   Temp (!) 97 3 °F (36 3 °C)   Ht 5' 9" (1 753 m)   Wt 110 kg (243 lb 6 4 oz)   BMI 35 94 kg/m²          Physical Exam   Constitutional: He is oriented to person, place, and time  He appears well-developed and well-nourished  Neck: No thyromegaly present  Cardiovascular: Normal rate, regular rhythm and normal heart sounds      Abdominal: Bowel sounds are normal  Musculoskeletal: He exhibits no edema  Neurological: He is oriented to person, place, and time  Skin: No erythema  Psychiatric: He has a normal mood and affect  His behavior is normal  Judgment and thought content normal    Nursing note and vitals reviewed  BMI Counseling: Body mass index is 35 94 kg/m²  Discussed the patient's BMI with him  The BMI is above average  BMI counseling and education was provided to the patient  Nutrition recommendations include reducing portion sizes and moderation in carbohydrate intake  Exercise recommendations include exercising 3-5 times per week

## 2019-01-10 DIAGNOSIS — I10 ESSENTIAL HYPERTENSION: ICD-10-CM

## 2019-01-10 RX ORDER — METOPROLOL SUCCINATE 25 MG/1
TABLET, EXTENDED RELEASE ORAL
Qty: 90 TABLET | Refills: 1 | Status: SHIPPED | OUTPATIENT
Start: 2019-01-10 | End: 2019-05-08 | Stop reason: SDUPTHER

## 2019-01-22 ENCOUNTER — HOSPITAL ENCOUNTER (OUTPATIENT)
Dept: RADIOLOGY | Facility: HOSPITAL | Age: 59
Discharge: HOME | End: 2019-01-22
Payer: COMMERCIAL

## 2019-01-22 DIAGNOSIS — R05.8 PAINFUL COUGH: ICD-10-CM

## 2019-01-22 DIAGNOSIS — R52 PAINFUL COUGH: ICD-10-CM

## 2019-01-22 PROCEDURE — 71046 X-RAY EXAM CHEST 2 VIEWS: CPT

## 2019-01-24 ENCOUNTER — TELEPHONE (OUTPATIENT)
Dept: FAMILY MEDICINE CLINIC | Facility: HOSPITAL | Age: 59
End: 2019-01-24

## 2019-01-24 ENCOUNTER — OFFICE VISIT (OUTPATIENT)
Dept: CARDIOLOGY CLINIC | Facility: CLINIC | Age: 59
End: 2019-01-24
Payer: COMMERCIAL

## 2019-01-24 VITALS
WEIGHT: 247 LBS | HEART RATE: 60 BPM | BODY MASS INDEX: 36.58 KG/M2 | HEIGHT: 69 IN | SYSTOLIC BLOOD PRESSURE: 128 MMHG | DIASTOLIC BLOOD PRESSURE: 74 MMHG

## 2019-01-24 DIAGNOSIS — I49.3 PVC (PREMATURE VENTRICULAR CONTRACTION): Primary | ICD-10-CM

## 2019-01-24 PROCEDURE — 99214 OFFICE O/P EST MOD 30 MIN: CPT | Performed by: INTERNAL MEDICINE

## 2019-01-24 NOTE — PROGRESS NOTES
Cardiology Follow Up    Carmen De Los Santos  1960  [id number]  [address] [facility] Johnnie Johnson  [address]  [address] [place] [address] [phone]    No diagnosis found  Interval History: Followup for PVCs  He is mildly symptomatic with PVCs  Otherwise feels well  No chest pain, no dyspnea or any other symptoms  Problem List     BPH with obstruction/lower urinary tract symptoms    Chronic pain of right knee    Depression    Diabetes mellitus type 2, uncomplicated ([facility] )    Overview Signed 4/10/2018  4:44 PM by Aileen Tavares MD     Transitioned From: Hyperglycemia         Lab Results   Component Value Date    HGBA1C 6 4 (H) 01/03/2019       No results for input(s): POCGLU in the last 72 hours      Blood Sugar Average: Last 72 hrs:          Diverticulosis of large intestine without perforation or abscess without bleeding    Esophagitis, reflux    Mixed hyperlipidemia    Essential hypertension    Class 2 severe obesity due to excess calories with serious comorbidity and body mass index (BMI) of 35 0 to 35 9 in adult [facility])    Obstructive sleep apnea    Heart palpitations    Persistent insomnia    Myalgia due to statin    Bradycardia    Symptomatic PVCs    Rectal bleeding    History of colonic polyps    Gastroesophageal reflux disease without esophagitis    Diverticulitis    Diverticular disease of colon    Colon cancer screening    Painful cough        Past Medical History:   Diagnosis Date    Atrial premature complex     last assessed: 05/23/2012    Depression 7/9/2012    Diabetes mellitus ([facility] )     Diverticulitis     GERD (gastroesophageal reflux disease)     Herpes simplex infection     resolved: 07/26/2017    Hyperlipidemia     Hypertension     Nicotine dependence     last assessed: 91/69/3637    Umbilical hernia     last assessed: 07/30/2014     Social History     Social History    Marital status: /Civil Union     Spouse name: N/A    Number of children: N/A    Years of education: N/A     Occupational History    Full-time employment      Social History Main Topics    Smoking status: Former Smoker    Smokeless tobacco: Never Used      Comment: History of current every day smoker    Alcohol use Yes      Comment: Social/occasional     Drug use: No    Sexual activity: Yes     Partners: Female     Other Topics Concern    Not on file     Social History Narrative    No narrative on file      Family History   Problem Relation Age of Onset    Depression Mother     Heart attack Father         acute    Depression Sister     Depression Sister      Past Surgical History:   Procedure Laterality Date    BOWEL RESECTION      COLON SIGMOID RESECTION LAPAROSCOPIC      onset: 07/22/2014;  partial-diverticulitis    UMBILICAL HERNIA REPAIR      onset: 07/22/2014       Current Outpatient Prescriptions:     amLODIPine-benazepril (LOTREL) 10-40 MG per capsule, TAKE ONE CAPSULE BY MOUTH DAILY, Disp: 30 capsule, Rfl: 5    aspirin 81 MG tablet, Take 81 mg by mouth daily  , Disp: , Rfl:     Glucose Blood (JOHNSON CONTOUR NEXT TEST VI), by In Vitro route 2 (two) times a day, Disp: , Rfl:     glucose blood (CONTOUR NEXT TEST) test strip, Use twice a day to test glucose, Disp: 100 each, Rfl: 3    Lancets MISC, by Does not apply route 2 (two) times a day, Disp: , Rfl:     metoprolol succinate (TOPROL-XL) 25 mg 24 hr tablet, TAKE 1 TABLET BY MOUTH EVERY MORNING TAKE 2 TABLETS BY MOUTH AT BEDTIME (Patient taking differently: TAKE 1 TABLET BY MOUTH EVERY MORNING TAKE 1 TABLETS BY MOUTH DAILY), Disp: 90 tablet, Rfl: 1    omeprazole (PriLOSEC) 40 MG capsule, TAKE ONE CAPSULE BY MOUTH EVERY DAY, Disp: 30 capsule, Rfl: 5    simvastatin (ZOCOR) 40 mg tablet, TAKE 1 TABLET EVERY DAY, Disp: 30 tablet, Rfl: 5    metFORMIN (GLUCOPHAGE) 500 mg tablet, Take 500 mg by mouth daily with breakfast, Disp: , Rfl:   No Known Allergies    Labs: Chemistry        Component Value Date/Time     11/21/2017 0948    K 4 5 01/03/2019 0934     01/03/2019 0934    CO2 24 01/03/2019 0934    BUN 21 01/03/2019 0934    CREATININE 1 09 11/21/2017 0948        Component Value Date/Time    CALCIUM 9 6 11/21/2017 0948    ALKPHOS 63 11/21/2017 0948    AST 19 11/21/2017 0948    ALT 18 11/21/2017 0948    BILITOT 0 4 11/21/2017 0948            Lab Results   Component Value Date    CHOL 146 11/21/2017    CHOL 177 07/24/2017    CHOL 172 12/07/2016     Lab Results   Component Value Date    HDL 47 01/03/2019    HDL 43 09/12/2018    HDL 46 04/06/2018     Lab Results   Component Value Date    LDLCALC 80 11/21/2017    LDLCALC 99 07/24/2017    LDLCALC 105 (H) 12/07/2016     Lab Results   Component Value Date    TRIG 166 (H) 01/03/2019    TRIG 151 (H) 09/12/2018    TRIG 163 (H) 04/06/2018     Lab Results   Component Value Date    CHOLHDL 3 8 04/06/2018       Imaging: No results found  Review of Systems   Constitution: Negative  HENT: Negative  Eyes: Negative  Cardiovascular: Positive for irregular heartbeat  Respiratory: Negative  Endocrine: Negative  Hematologic/Lymphatic: Negative  Skin: Negative  Musculoskeletal: Negative  Gastrointestinal: Negative  Genitourinary: Negative  Neurological: Negative  Psychiatric/Behavioral: Negative  Allergic/Immunologic: Negative  Vitals:    01/24/19 1031   BP: 128/74   Pulse: 60           Physical Exam   Constitutional: He is oriented to person, place, and time  No distress  HENT:   Mouth/Throat: No oropharyngeal exudate  Eyes: No scleral icterus  Neck: No JVD present  Cardiovascular: Normal rate and regular rhythm  No murmur heard  Pulmonary/Chest: Effort normal and breath sounds normal  No respiratory distress  He has no wheezes  He has no rales  Abdominal: Soft  Bowel sounds are normal  He exhibits no distension  There is no tenderness  There is no rebound  Musculoskeletal: He exhibits no edema  Neurological: He is alert and oriented to person, place, and time  Skin: Skin is warm and dry  He is not diaphoretic  Psychiatric: He has a normal mood and affect  His behavior is normal        Discussion/Summary:    1  PVCs: He is symptomatic at rest  He has a hx of longstanding PVCs  At this point continue Metoprolol       2  HTN: His BP is well controlled today  The patient was counseled regarding diagnostic results, instructions for management, risk factor reductions, impressions  total time of encounter was 25 minutes and 15 minutes was spent counseling

## 2019-01-25 ENCOUNTER — TELEPHONE (OUTPATIENT)
Dept: FAMILY MEDICINE CLINIC | Facility: HOSPITAL | Age: 59
End: 2019-01-25

## 2019-01-25 NOTE — TELEPHONE ENCOUNTER
Pt  Is having nasal congestion  He had a sore throat earlier  Sneezing  He just wants to know what OTC med he can use  He just wants to make sure he can take it with his other meds  Thanks

## 2019-01-26 ENCOUNTER — OFFICE VISIT (OUTPATIENT)
Dept: FAMILY MEDICINE CLINIC | Facility: HOSPITAL | Age: 59
End: 2019-01-26
Payer: COMMERCIAL

## 2019-01-26 VITALS
OXYGEN SATURATION: 96 % | HEIGHT: 70 IN | HEART RATE: 90 BPM | SYSTOLIC BLOOD PRESSURE: 144 MMHG | WEIGHT: 248.2 LBS | TEMPERATURE: 96.6 F | DIASTOLIC BLOOD PRESSURE: 80 MMHG | BODY MASS INDEX: 35.53 KG/M2

## 2019-01-26 DIAGNOSIS — J06.9 UPPER RESPIRATORY TRACT INFECTION, UNSPECIFIED TYPE: Primary | ICD-10-CM

## 2019-01-26 DIAGNOSIS — G89.29 CHRONIC PAIN OF RIGHT KNEE: ICD-10-CM

## 2019-01-26 DIAGNOSIS — M25.561 CHRONIC PAIN OF RIGHT KNEE: ICD-10-CM

## 2019-01-26 PROCEDURE — 99214 OFFICE O/P EST MOD 30 MIN: CPT | Performed by: INTERNAL MEDICINE

## 2019-01-26 RX ORDER — FLUTICASONE PROPIONATE 50 MCG
2 SPRAY, SUSPENSION (ML) NASAL DAILY
Qty: 1 BOTTLE | Refills: 1 | Status: SHIPPED | OUTPATIENT
Start: 2019-01-26 | End: 2019-08-06

## 2019-01-28 ENCOUNTER — HOSPITAL ENCOUNTER (OUTPATIENT)
Dept: RADIOLOGY | Facility: HOSPITAL | Age: 59
Discharge: HOME | End: 2019-01-28
Payer: COMMERCIAL

## 2019-01-28 ENCOUNTER — OFFICE VISIT (OUTPATIENT)
Dept: OBGYN CLINIC | Facility: HOSPITAL | Age: 59
End: 2019-01-28
Payer: COMMERCIAL

## 2019-01-28 ENCOUNTER — TELEPHONE (OUTPATIENT)
Dept: FAMILY MEDICINE CLINIC | Facility: HOSPITAL | Age: 59
End: 2019-01-28

## 2019-01-28 VITALS
HEART RATE: 67 BPM | DIASTOLIC BLOOD PRESSURE: 81 MMHG | BODY MASS INDEX: 36.9 KG/M2 | HEIGHT: 69 IN | SYSTOLIC BLOOD PRESSURE: 155 MMHG | WEIGHT: 249.12 LBS

## 2019-01-28 DIAGNOSIS — M17.11 PRIMARY OSTEOARTHRITIS OF RIGHT KNEE: ICD-10-CM

## 2019-01-28 DIAGNOSIS — M25.561 RIGHT KNEE PAIN, UNSPECIFIED CHRONICITY: Primary | ICD-10-CM

## 2019-01-28 DIAGNOSIS — M25.561 RIGHT KNEE PAIN, UNSPECIFIED CHRONICITY: ICD-10-CM

## 2019-01-28 PROCEDURE — 20610 DRAIN/INJ JOINT/BURSA W/O US: CPT | Performed by: PHYSICIAN ASSISTANT

## 2019-01-28 PROCEDURE — 99203 OFFICE O/P NEW LOW 30 MIN: CPT | Performed by: PHYSICIAN ASSISTANT

## 2019-01-28 PROCEDURE — 73562 X-RAY EXAM OF KNEE 3: CPT

## 2019-01-28 RX ORDER — LIDOCAINE HYDROCHLORIDE 10 MG/ML
2 INJECTION, SOLUTION INFILTRATION; PERINEURAL
Status: COMPLETED | OUTPATIENT
Start: 2019-01-28 | End: 2019-01-28

## 2019-01-28 RX ORDER — BUPIVACAINE HYDROCHLORIDE 5 MG/ML
2 INJECTION, SOLUTION EPIDURAL; INTRACAUDAL
Status: COMPLETED | OUTPATIENT
Start: 2019-01-28 | End: 2019-01-28

## 2019-01-28 RX ORDER — TRIAMCINOLONE ACETONIDE 40 MG/ML
40 INJECTION, SUSPENSION INTRA-ARTICULAR; INTRAMUSCULAR
Status: COMPLETED | OUTPATIENT
Start: 2019-01-28 | End: 2019-01-28

## 2019-01-28 RX ADMIN — LIDOCAINE HYDROCHLORIDE 2 ML: 10 INJECTION, SOLUTION INFILTRATION; PERINEURAL at 18:05

## 2019-01-28 RX ADMIN — TRIAMCINOLONE ACETONIDE 40 MG: 40 INJECTION, SUSPENSION INTRA-ARTICULAR; INTRAMUSCULAR at 18:05

## 2019-01-28 RX ADMIN — BUPIVACAINE HYDROCHLORIDE 2 ML: 5 INJECTION, SOLUTION EPIDURAL; INTRACAUDAL at 18:05

## 2019-01-28 NOTE — TELEPHONE ENCOUNTER
Saw Dr Verenice Zendejas on Saturday and talked with her about his knee pain  He tried all the things she told him to do on Saturday and it's no better  He will be leaving tomorrow for [place] for a month and would like to know if we can do anything for him  He is asking for an injection or something to take the pain away until he comes back from [place]   Please advise

## 2019-01-28 NOTE — PROGRESS NOTES
Assessment/Plan   Diagnoses and all orders for this visit:    Right knee pain, unspecified chronicity  -     XR knee 3 vw right non injury; Future    Primary osteoarthritis of right knee    Other orders  -     Large joint arthrocentesis          Subjective   Patient ID: Buddy Chavira is a 59 y o  male  Vitals:    01/28/19 1631   BP: 155/81   Pulse: 79     59yo male comes in for an evaluation of his right knee  He has been having ongoing medial knee pain for a few months with no specific injury  The pain is medial   The pain is dull in character, mild in severity, pain does not radiate and is not associated with numbness            The following portions of the patient's history were reviewed and updated as appropriate: allergies, current medications, past family history, past medical history, past social history, past surgical history and problem list     Review of Systems  Ortho Exam  Past Medical History:   Diagnosis Date    Atrial premature complex     last assessed: 05/23/2012    Depression 7/9/2012    [facility] ([facility] )     Diverticulitis     GERD (gastroesophageal reflux disease)     Herpes simplex infection     resolved: 07/26/2017    Hyperlipidemia     Hypertension     Nicotine dependence     last assessed: 42/19/0542    Umbilical hernia     last assessed: 07/30/2014     Past Surgical History:   Procedure Laterality Date    BOWEL RESECTION      COLON SIGMOID RESECTION LAPAROSCOPIC      onset: 07/22/2014;  partial-diverticulitis    UMBILICAL HERNIA REPAIR      onset: 07/22/2014     Family History   Problem Relation Age of Onset    Depression Mother     Heart attack Father         acute    Depression Sister     Depression Sister      Social History     Occupational History    Full-time employment      Social History Main Topics    Smoking status: Former Smoker    Smokeless tobacco: Never Used      Comment: History of current every day smoker    Alcohol use Yes      Comment: Social/occasional     Drug use: No    Sexual activity: Yes     Partners: Female       Review of Systems   Constitutional: Negative  HENT: Negative  Eyes: Negative  Respiratory: Negative  Cardiovascular: Negative  Gastrointestinal: Negative  Endocrine: Negative  Genitourinary: Negative  Musculoskeletal: As below      Allergic/Immunologic: Negative  Neurological: Negative  Hematological: Negative  Psychiatric/Behavioral: Negative  Objective   Physical Exam    · Constitutional: Awake, Alert, Oriented  · Eyes: EOMI  · Psych: Mood and affect appropriate  · Heart: regular rate and rhythm  · Lungs: No audible wheezing  · Abdomen: soft  · Lymph: no lymphedema   right Knee:  - Appearance   No swelling, discoloration, deformity, or ecchymosis  - Effusion   none  - Palpation   No tenderness about the  lateral joint line, patella, patellar tendon, MCL, LCL, hamstrings, or medial / lateral tibial plateau   + medial joint line tenderness  - ROM   Extension: 0 and Flexion: 100  - Special Tests   Christal's Test negative, Lachman's Test negative, Anterior Drawer Test negative, Posterior Drawer Test negative, Valgus Stress Test negative, Varus Stress Test negative and Patellar apprehension negative  - Motor   normal 5/5 in all planes  - NVI distally    I have personally reviewed pertinent films in PACS and my interpretation is no fracture  medial and anterior compartment narrowing  osteophyte formation       Large joint arthrocentesis  Date/Time: 1/28/2019 6:05 PM  Consent given by: patient  Site marked: site marked  Timeout: Immediately prior to procedure a time out was called to verify the correct patient, procedure, equipment, support staff and site/side marked as required   Supporting Documentation  Indications: pain   Procedure Details  Location: knee - R knee  Needle size: 22 G  Ultrasound guidance: no  Approach: lateral  Medications administered: 2 mL bupivacaine (PF) 0 5 %; 2 mL lidocaine 1 %; 40 mg triamcinolone acetonide 40 mg/mL    Patient tolerance: patient tolerated the procedure well with no immediate complications  Dressing:  Sterile dressing applied

## 2019-03-15 ENCOUNTER — OFFICE VISIT (OUTPATIENT)
Dept: OBGYN CLINIC | Facility: CLINIC | Age: 59
End: 2019-03-15
Payer: COMMERCIAL

## 2019-03-15 VITALS
WEIGHT: 239 LBS | SYSTOLIC BLOOD PRESSURE: 139 MMHG | HEIGHT: 69 IN | BODY MASS INDEX: 35.4 KG/M2 | HEART RATE: 59 BPM | DIASTOLIC BLOOD PRESSURE: 82 MMHG

## 2019-03-15 DIAGNOSIS — M17.11 PRIMARY OSTEOARTHRITIS OF RIGHT KNEE: Primary | ICD-10-CM

## 2019-03-15 PROCEDURE — 99213 OFFICE O/P EST LOW 20 MIN: CPT | Performed by: ORTHOPAEDIC SURGERY

## 2019-03-15 NOTE — LETTER
March 15, 2019     MD Julia Plaza  [address] [facility]  [address] [facility] [address]    Patient: Pamela Cormier   YOB: 1960   Date of Visit: 3/15/2019       Dear Dr Stanley Fink: Thank you for referring Jaleel Brewer to me for evaluation  Below are my notes for this consultation  If you have questions, please do not hesitate to call me  I look forward to following your patient along with you  Sincerely,        Nael Stack DO        CC: No Recipients  Nael Stack DO  3/15/2019 10:38 AM  Sign at close encounter  Ortho Sports Medicine Knee New Patient Visit     Assesment:     59year old male Right knee medial joint severe osteoarthritis and mild to moderate osteoarthritis of the patellofemoral joint    Plan:    Had a long discussion with the patient about all the treatment options prior to a total knee replacement; including corticosteroid, visco injectio,  bracing, and physical therapy  We are ordering a visco one shot series injection and if it approved by his insurance we will proceed with that  He also agreed to attend physical therapy 1-2 times and then continue the exercised at home  He will also see if his insurance a  brace and if it does he will utilize that  Conservative treatment:    Ice to knee for 20 minutes at least 1-2 times daily  PT for ROM/strengthening to knee, hip and core  OTC NSAIDS prn for pain  Tylenol for pain  Imaging: All imaging from today was reviewed by myself and explained to the patient  Injection:    The risks and benefits of the injection (which include but are not limited to: infection, bleeding,damage to nerve/artery, need for further intervention), as well as the risks and benefits of all alternative treatments were explained and understood  The patient elected to proceed with injection    The procedure was done with aseptic technique, and the patient tolerated the procedure well with no complications  Ordering a One time visco injection  Surgery:     No surgery is recommended at this point, continue with conservative treatment plan as noted  Follow up:    Return for Visco injection   Chief Complaint   Patient presents with    Right Knee - Pain       History of Present Illness: 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, 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 by their primary care physician, seen in clinic for consultation of right knee pain  Pain is located anterior, medial   The patient rates the pain as a 7/10  The pain has been present for 4 months  The patient denies injury  The pain is characterized as sharp, stabbing  The pain is present daily  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 due to pain and he is nervous about going up and down ladders with his job  He stated that his pain is worse with increased activity  He also stated that his knee bothers him at night and wakes him from sleep  He stated that he believes that he will need a knee replacement at some point, but would like to put that off until the winter time when his job is slower  He also would like to be pain free as much as possible  Pain is improved by sometimes by bracing  Pain is aggravated by weight bearing, walking and standing  Symptoms include clicking, popping and swelling  The patient has tried rest, ice, NSAIDS, bracing and injection  His last injection was a corticosteroid given on 1/28/2019  He stated that the injection lasted for about a month            Knee Surgical History:  None    Past Medical, Social and Family History:  Past Medical History:   Diagnosis Date    Atrial premature complex     last assessed: 05/23/2012    Depression 7/9/2012    Diabetes mellitus ([facility] [address] [facility]     Diverticulitis     GERD (gastroesophageal reflux disease)     Herpes simplex infection     resolved: 07/26/2017    Hyperlipidemia     Hypertension     Nicotine dependence     last assessed: 76/91/9224    Umbilical hernia     last assessed: 07/30/2014     Past Surgical History:   Procedure Laterality Date    BOWEL RESECTION      COLON SIGMOID RESECTION LAPAROSCOPIC      onset: 07/22/2014;  partial-diverticulitis    UMBILICAL HERNIA REPAIR      onset: 07/22/2014     No Known Allergies  Current Outpatient Medications on File Prior to Visit   Medication Sig Dispense Refill    amLODIPine-benazepril (LOTREL) 10-40 MG per capsule TAKE ONE CAPSULE BY MOUTH DAILY 30 capsule 5    aspirin 81 MG tablet Take 81 mg by mouth daily   fluticasone (FLONASE) 50 mcg/act nasal spray 2 sprays into each nostril daily 1 Bottle 1    Glucose Blood (JOHNSON CONTOUR NEXT TEST VI) by In Vitro route 2 (two) times a day      glucose blood (CONTOUR NEXT TEST) test strip Use twice a day to test glucose 100 each 3    Lancets MISC by Does not apply route 2 (two) times a day      metoprolol succinate (TOPROL-XL) 25 mg 24 hr tablet TAKE 1 TABLET BY MOUTH EVERY MORNING TAKE 2 TABLETS BY MOUTH AT BEDTIME (Patient taking differently: TAKE 1 TABLET BY MOUTH EVERY MORNING TAKE 1 TABLETS BY MOUTH DAILY) 90 tablet 1    omeprazole (PriLOSEC) 40 MG capsule TAKE ONE CAPSULE BY MOUTH EVERY DAY 30 capsule 5    simvastatin (ZOCOR) 40 mg tablet TAKE 1 TABLET EVERY DAY 30 tablet 5    metFORMIN (GLUCOPHAGE) 500 mg tablet Take 500 mg by mouth daily with breakfast       No current facility-administered medications on file prior to visit        Social History     Socioeconomic History    Marital status: /Civil Union     Spouse name: Not on file    Number of children: Not on file    Years of education: Not on file    Highest education level: Not on file   Occupational History    Occupation: Full-time employment   Social Needs    Financial resource strain: Not on file    Food insecurity:     Worry: Not on file     Inability: Not on file   [place] needs: Medical: Not on file     Non-medical: Not on file   Tobacco Use    Smoking status: Former Smoker    Smokeless tobacco: Never Used    Tobacco comment: History of current every day smoker   Substance and Sexual Activity    Alcohol use: Yes     Comment: Social/occasional     Drug use: No    Sexual activity: Yes     Partners: Female   Lifestyle    Physical activity:     Days per week: Not on file     Minutes per session: Not on file    Stress: Not on file   Relationships    Social connections:     Talks on phone: Not on file     Gets together: Not on file     Attends Confucianism service: Not on file     Active member of club or organization: Not on file     Attends meetings of clubs or organizations: Not on file     Relationship status: Not on file    Intimate partner violence:     Fear of current or ex partner: Not on file     Emotionally abused: Not on file     Physically abused: Not on file     Forced sexual activity: Not on file   Other Topics Concern    Not on file   Social History Narrative    Not on file         I have reviewed the past medical, surgical, social and family history, medications and allergies as documented in the EMR  Review of systems: ROS is negative other than that noted in the HPI  Constitutional: Negative for fatigue and fever  HENT: Negative for sore throat  Respiratory: Negative for shortness of breath  Cardiovascular: Negative for chest pain  Gastrointestinal: Negative for abdominal pain  Endocrine: Negative for cold intolerance and heat intolerance  Genitourinary: Negative for flank pain  Musculoskeletal: Negative for back pain  Skin: Negative for rash  Allergic/Immunologic: Negative for immunocompromised state  Neurological: Negative for dizziness  Psychiatric/Behavioral: Negative for agitation  Physical Exam:    Blood pressure 139/82, pulse 59, height 5' 9" (1 753 m), weight 108 kg (239 lb)      General/Constitutional: NAD, well developed, well nourished  HENT: Normocephalic, atraumatic  CV: Intact distal pulses, regular rate  Resp: No respiratory distress or labored breathing  Lymphatic: No lymphadenopathy palpated  Neuro: Alert and Oriented x 3, no focal deficits  Psych: Normal mood, normal affect, normal judgement, normal behavior  Skin: Warm, dry, no rashes, no erythema      Knee Exam (focused):                RIGHT LEFT   ROM:   0-100 0-130   Palpation: Effusion negative negative     MJL tenderness Positive Negative     LJL tenderness Negative Negative   Meniscus: Christal Negative Negative    Apley's Compression Negative Negative   Instability: Varus stable stable     Valgus stable stable   Special Tests: Lachman Negative Negative     Posterior drawer Negative Negative     Anterior drawer Negative Negative     Pivot shift not tested not tested     Dial not tested not tested   Patella: Palpation no tenderness no tenderness     Mobility 1/4 1/4     Apprehension Negative Negative   Other: Single leg 1/4 squat not tested not tested      LE NV Exam: +2 DP/PT pulses bilaterally  Sensation intact to light touch L2-S1 bilaterally     Bilateral hip ROM demonstrates no pain actively or passively    No calf tenderness to palpation bilaterally    Knee Imaging    X-rays of the right knee were reviewed, which demonstrate severe  osteoarthritis of the medial joint line and mild to moderate osteoarthritis of the patellofemoral joint  I have reviewed the radiology report and agree with their impression

## 2019-03-15 NOTE — PROGRESS NOTES
Ortho Sports Medicine Knee New Patient Visit     Assesment:     59year old male Right knee medial joint severe osteoarthritis and mild to moderate osteoarthritis of the patellofemoral joint    Plan:    Had a long discussion with the patient about all the treatment options prior to a total knee replacement; including corticosteroid, visco injectio,  bracing, and physical therapy  We are ordering a visco one shot series injection and if it approved by his insurance we will proceed with that  He also agreed to attend physical therapy 1-2 times and then continue the exercised at home  He will also see if his insurance a  brace and if it does he will utilize that  Conservative treatment:    Ice to knee for 20 minutes at least 1-2 times daily  PT for ROM/strengthening to knee, hip and core  OTC NSAIDS prn for pain  Tylenol for pain  Imaging: All imaging from today was reviewed by myself and explained to the patient  Injection:    The risks and benefits of the injection (which include but are not limited to: infection, bleeding,damage to nerve/artery, need for further intervention), as well as the risks and benefits of all alternative treatments were explained and understood  The patient elected to proceed with injection  The procedure was done with aseptic technique, and the patient tolerated the procedure well with no complications  Ordering a One time visco injection  Surgery:     No surgery is recommended at this point, continue with conservative treatment plan as noted  Follow up:    Return for Visco injection   Chief Complaint   Patient presents with    Right Knee - Pain       History of Present Illness: 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, 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  Pain is located anterior, medial   The patient rates the pain as a 7/10  The pain has been present for 4 months  The patient denies injury   The pain is characterized as sharp, stabbing  The pain is present daily  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 due to pain and he is nervous about going up and down ladders with his job  He stated that his pain is worse with increased activity  He also stated that his knee bothers him at night and wakes him from sleep  He stated that he believes that he will need a knee replacement at some point, but would like to put that off until the winter time when his job is slower  He also would like to be pain free as much as possible  Pain is improved by sometimes by bracing  Pain is aggravated by weight bearing, walking and standing  Symptoms include clicking, popping and swelling  The patient has tried rest, ice, NSAIDS, bracing and injection  His last injection was a corticosteroid given on 1/28/2019  He stated that the injection lasted for about a month  Knee Surgical History:  None    Past Medical, Social and Family History:  Past Medical History:   Diagnosis Date    Atrial premature complex     last assessed: 05/23/2012    Depression 7/9/2012    [facility]     Diverticulitis     GERD (gastroesophageal reflux disease)     Herpes simplex infection     resolved: 07/26/2017    Hyperlipidemia     Hypertension     Nicotine dependence     last assessed: 94/47/0027    Umbilical hernia     last assessed: 07/30/2014     Past Surgical History:   Procedure Laterality Date    BOWEL RESECTION      COLON SIGMOID RESECTION LAPAROSCOPIC      onset: 07/22/2014;  partial-diverticulitis    UMBILICAL HERNIA REPAIR      onset: 07/22/2014     No Known Allergies  Current Outpatient Medications on File Prior to Visit   Medication Sig Dispense Refill    amLODIPine-benazepril (LOTREL) 10-40 MG per capsule TAKE ONE CAPSULE BY MOUTH DAILY 30 capsule 5    aspirin 81 MG tablet Take 81 mg by mouth daily        fluticasone (FLONASE) 50 mcg/act nasal spray 2 sprays into each nostril daily 1 Bottle 1    Glucose Blood (JOHNSON CONTOUR NEXT TEST VI) by In Vitro route 2 (two) times a day      glucose blood (CONTOUR NEXT TEST) test strip Use twice a day to test glucose 100 each 3    Lancets MISC by Does not apply route 2 (two) times a day      metoprolol succinate (TOPROL-XL) 25 mg 24 hr tablet TAKE 1 TABLET BY MOUTH EVERY MORNING TAKE 2 TABLETS BY MOUTH AT BEDTIME (Patient taking differently: TAKE 1 TABLET BY MOUTH EVERY MORNING TAKE 1 TABLETS BY MOUTH DAILY) 90 tablet 1    omeprazole (PriLOSEC) 40 MG capsule TAKE ONE CAPSULE BY MOUTH EVERY DAY 30 capsule 5    simvastatin (ZOCOR) 40 mg tablet TAKE 1 TABLET EVERY DAY 30 tablet 5    metFORMIN (GLUCOPHAGE) 500 mg tablet Take 500 mg by mouth daily with breakfast       No current facility-administered medications on file prior to visit        Social History     Socioeconomic History    Marital status: /Civil Union     Spouse name: Not on file    Number of children: Not on file    Years of education: Not on file    Highest education level: Not on file   Occupational History    Occupation: Full-time employment   Social Needs    Financial resource strain: Not on file    Food insecurity:     Worry: Not on file     Inability: Not on file    Transportation needs:     Medical: Not on file     Non-medical: Not on file   Tobacco Use    Smoking status: Former Smoker    Smokeless tobacco: Never Used    Tobacco comment: History of current every day smoker   Substance and Sexual Activity    Alcohol use: Yes     Comment: Social/occasional     Drug use: No    Sexual activity: Yes     Partners: Female   Lifestyle    Physical activity:     Days per week: Not on file     Minutes per session: Not on file    Stress: Not on file   Relationships    Social connections:     Talks on phone: Not on file     Gets together: Not on file     Attends Rastafari service: Not on file     Active member of club or organization: Not on file     Attends meetings of clubs or organizations: Not on file     Relationship status: Not on file    Intimate partner violence:     Fear of current or ex partner: Not on file     Emotionally abused: Not on file     Physically abused: Not on file     Forced sexual activity: Not on file   Other Topics Concern    Not on file   Social History Narrative    Not on file         I have reviewed the past medical, surgical, social and family history, medications and allergies as documented in the EMR  Review of systems: ROS is negative other than that noted in the HPI  Constitutional: Negative for fatigue and fever  HENT: Negative for sore throat  Respiratory: Negative for shortness of breath  Cardiovascular: Negative for chest pain  Gastrointestinal: Negative for abdominal pain  Endocrine: Negative for cold intolerance and heat intolerance  Genitourinary: Negative for flank pain  Musculoskeletal: Negative for back pain  Skin: Negative for rash  Allergic/Immunologic: Negative for immunocompromised state  Neurological: Negative for dizziness  Psychiatric/Behavioral: Negative for agitation  Physical Exam:    Blood pressure 139/82, pulse 59, height 5' 9" (1 753 m), weight 108 kg (239 lb)  General/Constitutional: NAD, well developed, well nourished  HENT: Normocephalic, atraumatic  CV: Intact distal pulses, regular rate  Resp: No respiratory distress or labored breathing  Lymphatic: No lymphadenopathy palpated  Neuro: Alert and Oriented x 3, no focal deficits  Psych: Normal mood, normal affect, normal judgement, normal behavior  Skin: Warm, dry, no rashes, no erythema      Knee Exam (focused):                RIGHT LEFT   ROM:   0-100 0-130   Palpation: Effusion negative negative     MJL tenderness Positive Negative     LJL tenderness Negative Negative   Meniscus:  Christal Negative Negative    Apley's Compression Negative Negative   Instability: Varus stable stable   Valgus stable stable   Special Tests: Lachman Negative Negative     Posterior drawer Negative Negative     Anterior drawer Negative Negative     Pivot shift not tested not tested     Dial not tested not tested   Patella: Palpation no tenderness no tenderness     Mobility 1/4 1/4     Apprehension Negative Negative   Other: Single leg 1/4 squat not tested not tested      LE NV Exam: +2 DP/PT pulses bilaterally  Sensation intact to light touch L2-S1 bilaterally     Bilateral hip ROM demonstrates no pain actively or passively    No calf tenderness to palpation bilaterally    Knee Imaging    X-rays of the right knee were reviewed, which demonstrate severe  osteoarthritis of the medial joint line and mild to moderate osteoarthritis of the patellofemoral joint  I have reviewed the radiology report and agree with their impression

## 2019-03-15 NOTE — LETTER
March 15, 2019     MD Julia Christensen  [address] [facility]  [address] [address] [address]    Patient: Makenna Laureano   YOB: 1960   Date of Visit: 3/15/2019       Dear Dr Reji Abdi: Thank you for referring Jhoana Nguyen to me for evaluation  Below are my notes for this consultation  If you have questions, please do not hesitate to call me  I look forward to following your patient along with you  Sincerely,        Gia Lynn DO        CC: No Recipients  Gia Lynn DO  3/15/2019 10:39 AM  Sign at close encounter  Ortho Sports Medicine Knee New Patient Visit     Assesment:     59year old male Right knee medial joint severe osteoarthritis and mild to moderate osteoarthritis of the patellofemoral joint    Plan:    Had a long discussion with the patient about all the treatment options prior to a total knee replacement; including corticosteroid, visco injectio,  bracing, and physical therapy  We are ordering a visco one shot series injection and if it approved by his insurance we will proceed with that  He also agreed to attend physical therapy 1-2 times and then continue the exercised at home  He will also see if his insurance a  brace and if it does he will utilize that  Conservative treatment:    Ice to knee for 20 minutes at least 1-2 times daily  PT for ROM/strengthening to knee, hip and core  OTC NSAIDS prn for pain  Tylenol for pain  Imaging: All imaging from today was reviewed by myself and explained to the patient  Injection:    The risks and benefits of the injection (which include but are not limited to: infection, bleeding,damage to nerve/artery, need for further intervention), as well as the risks and benefits of all alternative treatments were explained and understood  The patient elected to proceed with injection    The procedure was done with aseptic technique, and the patient tolerated the procedure well with no complications  Ordering a One time visco injection  Surgery:     No surgery is recommended at this point, continue with conservative treatment plan as noted  Follow up:    Return for Visco injection   Chief Complaint   Patient presents with    Right Knee - Pain       History of Present Illness: 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, 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  Pain is located anterior, medial   The patient rates the pain as a 7/10  The pain has been present for 4 months  The patient denies injury  The pain is characterized as sharp, stabbing  The pain is present daily  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 due to pain and he is nervous about going up and down ladders with his job  He stated that his pain is worse with increased activity  He also stated that his knee bothers him at night and wakes him from sleep  He stated that he believes that he will need a knee replacement at some point, but would like to put that off until the winter time when his job is slower  He also would like to be pain free as much as possible  Pain is improved by sometimes by bracing  Pain is aggravated by weight bearing, walking and standing  Symptoms include clicking, popping and swelling  The patient has tried rest, ice, NSAIDS, bracing and injection  His last injection was a corticosteroid given on 1/28/2019  He stated that the injection lasted for about a month            Knee Surgical History:  None    Past Medical, Social and Family History:  Past Medical History:   Diagnosis Date    Atrial premature complex     last assessed: 05/23/2012    Depression 7/9/2012    Diabetes mellitus ([facility] [address] )     Diverticulitis     GERD (gastroesophageal reflux disease)     Herpes simplex infection     resolved: 07/26/2017    Hyperlipidemia     Hypertension     Nicotine dependence     last assessed: 12/93/8257    Umbilical hernia     last assessed: 07/30/2014     Past Surgical History:   Procedure Laterality Date    BOWEL RESECTION      COLON SIGMOID RESECTION LAPAROSCOPIC      onset: 07/22/2014;  partial-diverticulitis    UMBILICAL HERNIA REPAIR      onset: 07/22/2014     No Known Allergies  Current Outpatient Medications on File Prior to Visit   Medication Sig Dispense Refill    amLODIPine-benazepril (LOTREL) 10-40 MG per capsule TAKE ONE CAPSULE BY MOUTH DAILY 30 capsule 5    aspirin 81 MG tablet Take 81 mg by mouth daily   fluticasone (FLONASE) 50 mcg/act nasal spray 2 sprays into each nostril daily 1 Bottle 1    Glucose Blood (JOHNSON CONTOUR NEXT TEST VI) by In Vitro route 2 (two) times a day      glucose blood (CONTOUR NEXT TEST) test strip Use twice a day to test glucose 100 each 3    Lancets MISC by Does not apply route 2 (two) times a day      metoprolol succinate (TOPROL-XL) 25 mg 24 hr tablet TAKE 1 TABLET BY MOUTH EVERY MORNING TAKE 2 TABLETS BY MOUTH AT BEDTIME (Patient taking differently: TAKE 1 TABLET BY MOUTH EVERY MORNING TAKE 1 TABLETS BY MOUTH DAILY) 90 tablet 1    omeprazole (PriLOSEC) 40 MG capsule TAKE ONE CAPSULE BY MOUTH EVERY DAY 30 capsule 5    simvastatin (ZOCOR) 40 mg tablet TAKE 1 TABLET EVERY DAY 30 tablet 5    metFORMIN (GLUCOPHAGE) 500 mg tablet Take 500 mg by mouth daily with breakfast       No current facility-administered medications on file prior to visit        Social History     Socioeconomic History    Marital status: /Civil Union     Spouse name: Not on file    Number of children: Not on file    Years of education: Not on file    Highest education level: Not on file   Occupational History    Occupation: Full-time employment   Social Needs    Financial resource strain: Not on file    Food insecurity:     Worry: Not on file     Inability: Not on file    Transportation needs:     Medical: Not on file     Non-medical: Not on file   Tobacco Use  Smoking status: Former Smoker    Smokeless tobacco: Never Used    Tobacco comment: History of current every day smoker   Substance and Sexual Activity    Alcohol use: Yes     Comment: Social/occasional     Drug use: No    Sexual activity: Yes     Partners: Female   Lifestyle    Physical activity:     Days per week: Not on file     Minutes per session: Not on file    Stress: Not on file   Relationships    Social connections:     Talks on phone: Not on file     Gets together: Not on file     Attends Anabaptism service: Not on file     Active member of club or organization: Not on file     Attends meetings of clubs or organizations: Not on file     Relationship status: Not on file    Intimate partner violence:     Fear of current or ex partner: Not on file     Emotionally abused: Not on file     Physically abused: Not on file     Forced sexual activity: Not on file   Other Topics Concern    Not on file   Social History Narrative    Not on file         I have reviewed the past medical, surgical, social and family history, medications and allergies as documented in the EMR  Review of systems: ROS is negative other than that noted in the HPI  Constitutional: Negative for fatigue and fever  HENT: Negative for sore throat  Respiratory: Negative for shortness of breath  Cardiovascular: Negative for chest pain  Gastrointestinal: Negative for abdominal pain  Endocrine: Negative for cold intolerance and heat intolerance  Genitourinary: Negative for flank pain  Musculoskeletal: Negative for back pain  Skin: Negative for rash  Allergic/Immunologic: Negative for immunocompromised state  Neurological: Negative for dizziness  Psychiatric/Behavioral: Negative for agitation  Physical Exam:    Blood pressure 139/82, pulse 59, height 5' 9" (1 753 m), weight 108 kg (239 lb)      General/Constitutional: NAD, well developed, well nourished  HENT: Normocephalic, atraumatic  CV: Intact distal pulses, regular rate  Resp: No respiratory distress or labored breathing  Lymphatic: No lymphadenopathy palpated  Neuro: Alert and Oriented x 3, no focal deficits  Psych: Normal mood, normal affect, normal judgement, normal behavior  Skin: Warm, dry, no rashes, no erythema      Knee Exam (focused):                RIGHT LEFT   ROM:   0-100 0-130   Palpation: Effusion negative negative     MJL tenderness Positive Negative     LJL tenderness Negative Negative   Meniscus: Christal Negative Negative    Apley's Compression Negative Negative   Instability: Varus stable stable     Valgus stable stable   Special Tests: Lachman Negative Negative     Posterior drawer Negative Negative     Anterior drawer Negative Negative     Pivot shift not tested not tested     Dial not tested not tested   Patella: Palpation no tenderness no tenderness     Mobility 1/4 1/4     Apprehension Negative Negative   Other: Single leg 1/4 squat not tested not tested      LE NV Exam: +2 DP/PT pulses bilaterally  Sensation intact to light touch L2-S1 bilaterally     Bilateral hip ROM demonstrates no pain actively or passively    No calf tenderness to palpation bilaterally    Knee Imaging    X-rays of the right knee were reviewed, which demonstrate severe  osteoarthritis of the medial joint line and mild to moderate osteoarthritis of the patellofemoral joint  I have reviewed the radiology report and agree with their impression

## 2019-03-18 ENCOUNTER — TELEPHONE (OUTPATIENT)
Dept: OBGYN CLINIC | Facility: HOSPITAL | Age: 59
End: 2019-03-18

## 2019-03-18 NOTE — TELEPHONE ENCOUNTER
Spoke with patient about his injections  He was hoping he could get them before he left on a business trip  I spoke with oneida who contacted the insurance  His insurance stated as long as he uses the synvisc one no prior Fartun Beady is needed  Patient was left a vm and asked to ask for me   If I am not available please schedule patient for R KNEE SYNVISC ONE BUY AND BILL with Dr Anika Mann

## 2019-03-20 ENCOUNTER — EVALUATION (OUTPATIENT)
Dept: PHYSICAL THERAPY | Facility: CLINIC | Age: 59
End: 2019-03-20
Payer: COMMERCIAL

## 2019-03-20 DIAGNOSIS — M17.11 PRIMARY OSTEOARTHRITIS OF RIGHT KNEE: ICD-10-CM

## 2019-03-20 PROCEDURE — 97161 PT EVAL LOW COMPLEX 20 MIN: CPT | Performed by: PHYSICAL THERAPIST

## 2019-03-20 PROCEDURE — 97110 THERAPEUTIC EXERCISES: CPT | Performed by: PHYSICAL THERAPIST

## 2019-03-20 NOTE — PROGRESS NOTES
PT Evaluation / Discharge    Today's date: 3/20/2019  Patient name: Patricia Iglesias  : 1960  MRN: [id number]  Referring provider: Domonique Landa  Dx:   Encounter Diagnosis     ICD-10-CM    1  Primary osteoarthritis of right knee M17 11 Ambulatory referral to Physical Therapy        Addendum 4/3/19:  Pt continuing to do HEP and will call if needs more PT  Assessment  Assessment details: Patricia Iglesias is a 62 y o  male presenting to outpatient physical therapy at [place] [address] with complaints of R knee pain  He presents with decreased strength, limited flexibility, poor balance, decreased tolerance to activity and decreased functional mobility due to primary osteoarthritis of right knee  He would benefit from skilled PT services in order to address these deficits and reach maximum level of function  Thank you for the referral!  Impairments: abnormal gait, abnormal or restricted ROM, activity intolerance, impaired balance, impaired physical strength, lacks appropriate home exercise program and pain with function  Barriers to therapy: None  Understanding of Dx/Px/POC: excellent   Prognosis: good    Goals  ST  Independent with HEP in 2 weeks  2  Increase R LE flexibility to WNL in quadriceps, ITB in 3 weeks     LT  Achieve FOTO score of 58/100 in 6 weeks   2  Able to perform a full day at work without limitations from R knee pain in 6 weeks  3    Strength R knee flex and ext = 5/ in 6 weeks      Plan  Patient would benefit from: skilled PT  Planned modality interventions: cryotherapy  Planned therapy interventions: ADL retraining, balance/weight bearing training, flexibility, functional ROM exercises, home exercise program, joint mobilization, manual therapy, neuromuscular re-education, strengthening, stretching, therapeutic activities and therapeutic exercise  Frequency: 2x week  Duration in weeks: 6  Treatment plan discussed with: patient        Subjective Evaluation    History of Present Illness  Mechanism of injury: 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  He c/o R knee pain that is located anterior, medial   The pain has been present for 4 months without injury  He feels that his knee may give out at times due to pain and he is nervous about going up and down ladders with his job  He stated that his pain is worse with increased activity  He also stated that his knee bothers him at night and wakes him from sleep  He stated that he believes that he will need a knee replacement at some point, but would like to put that off until the winter time when his job is slower  Pain is improved by sometimes by bracing  Symptoms include clicking, popping and swelling  The patient has tried rest, ice, NSAIDS, bracing and injection  His last injection was a corticosteroid given on 2019  He stated that the injection lasted for about a month  L knee is starting to hurt due to altered gait  Recurrent probem    Quality of life: good    Pain  Current pain ratin  At best pain ratin  At worst pain ratin  Quality: sharp  Relieving factors: rest (bracing)  Aggravating factors: stair climbing, walking and standing  Progression: worsening    Social Support  Steps to enter house: yes  Stairs in house: yes   Lives in: multiple-level home    Employment status: working    Diagnostic Tests  X-ray: normal (tricompartment OA R knee)  Treatments  Previous treatment: injection treatment  Patient Goals  Patient goals for therapy: decreased pain, increased motion, increased strength and return to sport/leisure activities          Objective     Observations     Right Knee   Negative for effusion  Palpation     Additional Palpation Details  Min tightness R H/S and ITB, mod R rectus femoris  Tenderness     Right Knee   Tenderness in the medial joint line  No tenderness in the ITB and lateral joint line       Neurological Testing Sensation     Knee   Left Knee   Intact: light touch    Right Knee   Intact: light touch     Reflexes     Right   Patellar (L4): normal (2+)  Achilles (S1): normal (2+)    Active Range of Motion   Left Knee   Normal active range of motion    Right Knee   Normal active range of motion    Mobility   Patellar Mobility:     Right Knee   WFL: medial, lateral, superior and inferior    Patellar Static Positioning   Left Knee: WFL  [facility]    Strength/Myotome Testing     Left Hip   Normal muscle strength    Right Hip   Normal muscle strength    Left Knee   Flexion: 5  Extension: 4+    Right Knee   Flexion: 4+  Extension: 4    Tests     Right Knee   Positive patella-femoral grind  Ambulation     Observational Gait   Increased right step length  Decreased right stance time and left step length  Daily Treatment Diary     Dx:    1   Primary osteoarthritis of right knee      POC EXPIRES On:  5/1/19  PRECAUTIONS:  None  CO-MORBIDITES:  Type 2 diabetes  PERSONAL FACTORS:  Works as a contractor    Manual                                                                                   Exercise Diary  3/20            Bridges with ball adduction 5" 15            Strap supine R H/S stretch 20" 3            Strap R ITb stretch 20" 3            Strap prone R rectus femoris stretch 20" 3                                                                                                                                                                                                                                Modalities

## 2019-03-21 NOTE — TELEPHONE ENCOUNTER
Spoke with patient  He is scheduled in [place] [id number] for 3/27/2019 for his injection   Per insurance he must have synvisc one injection and we can buy and bill

## 2019-03-27 ENCOUNTER — OFFICE VISIT (OUTPATIENT)
Dept: OBGYN CLINIC | Facility: CLINIC | Age: 59
End: 2019-03-27
Payer: COMMERCIAL

## 2019-03-27 VITALS
SYSTOLIC BLOOD PRESSURE: 120 MMHG | WEIGHT: 239 LBS | DIASTOLIC BLOOD PRESSURE: 73 MMHG | HEART RATE: 60 BPM | BODY MASS INDEX: 35.4 KG/M2 | HEIGHT: 69 IN

## 2019-03-27 DIAGNOSIS — M17.11 PRIMARY OSTEOARTHRITIS OF RIGHT KNEE: Primary | ICD-10-CM

## 2019-03-27 NOTE — LETTER
March 28, 2019     MD Julia Crenshaw  [address] [facility]  [address] [address] [address]    Patient: Patricia Iglesias   YOB: 1960   Date of Visit: 3/27/2019       Dear Dr Solis Sat: Thank you for referring Cintia Queen to me for evaluation  Below are my notes for this consultation  If you have questions, please do not hesitate to call me  I look forward to following your patient along with you  Sincerely,        Cecilio Leonard,         CC: No Recipients  Mirna New PA-C  3/27/2019  9:52 AM  Sign at close encounter  Large joint arthrocentesis  Date/Time: 3/27/2019 9:50 AM  Consent given by: patient  Site marked: site marked  Supporting Documentation  Indications: pain   Procedure Details  Needle size: 18 G  Ultrasound guidance: no  Approach: superior  Medications administered: 48 mg hylan 48 MG/6ML          Ortho Sports Medicine Knee New Patient Visit     Assesment:     59year old male Right knee medial joint severe osteoarthritis and mild to moderate osteoarthritis of the patellofemoral joint    Plan:    Viscosupplementation given for right knee    Continue home exercise program    Ice to knee and Tylenol as needed    Let pain guide gradual return to activity    Follow up:    Return if symptoms worsen or fail to improve  Chief Complaint   Patient presents with    Right Knee - Injections       History of Present Illness: The patient is returns for follow up of right knee  Since the prior visit, He reports no significant change in symptoms    Pain is located anterior, medial and deep  The patient rates the pain as a 6/10  The pain has been present for 4 months  The patient denies injury  The pain is characterized as sharp, stabbing and aching  The pain is present most days  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 due to pain and he is nervous about going up and down ladders with his job  He stated that his pain is worse with increased activity  He also stated that his knee bothers him at night and wakes him from sleep  He stated that he believes that he will need a knee replacement at some point, but would like to put that off until the winter time when his job is slower  He also would like to be pain free as much as possible  Pain is improved by sometimes by bracing  Pain is aggravated by weight bearing, walking and standing  Symptoms include clicking, popping and swelling  The patient has tried rest, ice, NSAIDS, bracing and injection  His last injection was a corticosteroid given on 1/28/2019  He stated that the injection lasted for about a month  Knee Surgical History:  None    Past Medical, Social and Family History:  Past Medical History:   Diagnosis Date    Atrial premature complex     last assessed: 05/23/2012    Depression 7/9/2012    [facility]     Diverticulitis     GERD (gastroesophageal reflux disease)     Herpes simplex infection     resolved: 07/26/2017    Hyperlipidemia     Hypertension     Nicotine dependence     last assessed: 49/66/9069    Umbilical hernia     last assessed: 07/30/2014     Past Surgical History:   Procedure Laterality Date    BOWEL RESECTION      COLON SIGMOID RESECTION LAPAROSCOPIC      onset: 07/22/2014;  partial-diverticulitis    UMBILICAL HERNIA REPAIR      onset: 07/22/2014     No Known Allergies  Current Outpatient Medications on File Prior to Visit   Medication Sig Dispense Refill    amLODIPine-benazepril (LOTREL) 10-40 MG per capsule TAKE ONE CAPSULE BY MOUTH DAILY 30 capsule 5    aspirin 81 MG tablet Take 81 mg by mouth daily        fluticasone (FLONASE) 50 mcg/act nasal spray 2 sprays into each nostril daily 1 Bottle 1    Glucose Blood (JOHNSON CONTOUR NEXT TEST VI) by In Vitro route 2 (two) times a day      glucose blood (CONTOUR NEXT TEST) test strip Use twice a day to test glucose 100 each 3    Lancets MISC by Does not apply route 2 (two) times a day      metFORMIN (GLUCOPHAGE) 500 mg tablet Take 500 mg by mouth daily with breakfast      metoprolol succinate (TOPROL-XL) 25 mg 24 hr tablet TAKE 1 TABLET BY MOUTH EVERY MORNING TAKE 2 TABLETS BY MOUTH AT BEDTIME (Patient taking differently: TAKE 1 TABLET BY MOUTH EVERY MORNING TAKE 1 TABLETS BY MOUTH DAILY) 90 tablet 1    omeprazole (PriLOSEC) 40 MG capsule TAKE ONE CAPSULE BY MOUTH EVERY DAY 30 capsule 5    simvastatin (ZOCOR) 40 mg tablet TAKE 1 TABLET EVERY DAY 30 tablet 5     No current facility-administered medications on file prior to visit        Social History     Socioeconomic History    Marital status: /Civil Union     Spouse name: Not on file    Number of children: Not on file    Years of education: Not on file    Highest education level: Not on file   Occupational History    Occupation: Full-time employment   Social Needs    Financial resource strain: Not on file    Food insecurity:     Worry: Not on file     Inability: Not on file    Transportation needs:     Medical: Not on file     Non-medical: Not on file   Tobacco Use    Smoking status: Former Smoker    Smokeless tobacco: Never Used    Tobacco comment: History of current every day smoker   Substance and Sexual Activity    Alcohol use: Yes     Comment: Social/occasional     Drug use: No    Sexual activity: Yes     Partners: Female   Lifestyle    Physical activity:     Days per week: Not on file     Minutes per session: Not on file    Stress: Not on file   Relationships    Social connections:     Talks on phone: Not on file     Gets together: Not on file     Attends Taoist service: Not on file     Active member of club or organization: Not on file     Attends meetings of clubs or organizations: Not on file     Relationship status: Not on file    Intimate partner violence:     Fear of current or ex partner: Not on file     Emotionally abused: Not on file Physically abused: Not on file     Forced sexual activity: Not on file   Other Topics Concern    Not on file   Social History Narrative    Not on file         I have reviewed the past medical, surgical, social and family history, medications and allergies as documented in the EMR  Review of systems: ROS is negative other than that noted in the HPI  Physical Exam:    Blood pressure 120/73, pulse 60, height 5' 9" (1 753 m), weight 108 kg (239 lb)  General/Constitutional: NAD, well developed, well nourished    Knee Exam (focused):                RIGHT LEFT   ROM:   0-100 0-130   Palpation: Effusion negative negative     MJL tenderness Positive Negative     LJL tenderness Negative Negative   Meniscus: Christal Negative Negative    Apley's Compression Negative Negative   Instability: Varus stable stable     Valgus stable stable   Special Tests: Lachman Negative Negative     Posterior drawer Negative Negative     Anterior drawer Negative Negative     Pivot shift not tested not tested     Dial not tested not tested   Patella: Palpation no tenderness no tenderness     Mobility 1/4 1/4     Apprehension Negative Negative   Other: Single leg 1/4 squat not tested not tested      LE NV Exam: +2 DP/PT pulses bilaterally  Sensation intact to light touch L2-S1 bilaterally     Bilateral hip ROM demonstrates no pain actively or passively    No calf tenderness to palpation bilaterally    Knee Imaging    X-rays of the right knee were reviewed, which demonstrate severe  osteoarthritis of the medial joint line and mild to moderate osteoarthritis of the patellofemoral joint  I have reviewed the radiology report and agree with their impression

## 2019-03-27 NOTE — LETTER
March 28, 2019     MD Julia Randhawa  [address] [facility]  [address] [address] [address]    Patient: Bj Hayes   YOB: 1960   Date of Visit: 3/27/2019       Dear Dr Jeraldine Mortimer: Thank you for referring Ap Canchola to me for evaluation  Below are my notes for this consultation  If you have questions, please do not hesitate to call me  I look forward to following your patient along with you  Sincerely,        Dorys Porras DO        CC: No Recipients  Dorys Porras DO  3/28/2019  3:50 PM  Signed    Ortho Sports Medicine Knee Patient Visit     Assesment:      59year old male Right knee medial joint severe osteoarthritis and mild to moderate osteoarthritis of the patellofemoral joint     Plan:     Viscosupplementation given for right knee     Continue home exercise program     Ice to knee and Tylenol as needed     Let pain guide gradual return to activity     Follow up:     Return if symptoms worsen or fail to improve                Chief Complaint   Patient presents with    Right Knee - Injections         History of Present Illness:     The patient is returns for follow up of right knee  Since the prior visit, He reports no significant change in symptoms     Pain is located anterior, medial and deep  The patient rates the pain as a 6/10  The pain has been present for 4 months        The patient denies injury  The pain is characterized as sharp, stabbing and aching  The pain is present most days  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 due to pain and he is nervous about going up and down ladders with his job  He stated that his pain is worse with increased activity  He also stated that his knee bothers him at night and wakes him from sleep  He stated that he believes that he will need a knee replacement at some point, but would like to put that off until the winter time when his job is slower   He also would like to be pain free as much as possible      Pain is improved by sometimes by bracing  Pain is aggravated by weight bearing, walking and standing  Symptoms include clicking, popping and swelling  The patient has tried rest, ice, NSAIDS, bracing and injection  His last injection was a corticosteroid given on 1/28/2019   He stated that the injection lasted for about a month             Knee Surgical History:  None     Past Medical, Social and Family History:  Medical History        Past Medical History:   Diagnosis Date    Atrial premature complex       last assessed: 05/23/2012    Depression 7/9/2012    Diabetes mellitus ([facility] [address] )      Diverticulitis      GERD (gastroesophageal reflux disease)      Herpes simplex infection       resolved: 07/26/2017    Hyperlipidemia      Hypertension      Nicotine dependence       last assessed: 06/26/6268    Umbilical hernia       last assessed: 07/30/2014         Surgical History         Past Surgical History:   Procedure Laterality Date    BOWEL RESECTION        COLON SIGMOID RESECTION LAPAROSCOPIC         onset: 07/22/2014;  partial-diverticulitis    UMBILICAL HERNIA REPAIR         onset: 07/22/2014         No Known Allergies         Current Outpatient Medications on File Prior to Visit   Medication Sig Dispense Refill    amLODIPine-benazepril (LOTREL) 10-40 MG per capsule TAKE ONE CAPSULE BY MOUTH DAILY 30 capsule 5    aspirin 81 MG tablet Take 81 mg by mouth daily         fluticasone (FLONASE) 50 mcg/act nasal spray 2 sprays into each nostril daily 1 Bottle 1    Glucose Blood (JOHNSON CONTOUR NEXT TEST VI) by In Vitro route 2 (two) times a day        glucose blood (CONTOUR NEXT TEST) test strip Use twice a day to test glucose 100 each 3    Lancets MISC by Does not apply route 2 (two) times a day        metFORMIN (GLUCOPHAGE) 500 mg tablet Take 500 mg by mouth daily with breakfast        metoprolol succinate (TOPROL-XL) 25 mg 24 hr tablet TAKE 1 TABLET BY MOUTH EVERY MORNING TAKE 2 TABLETS BY MOUTH AT BEDTIME (Patient taking differently: TAKE 1 TABLET BY MOUTH EVERY MORNING TAKE 1 TABLETS BY MOUTH DAILY) 90 tablet 1    omeprazole (PriLOSEC) 40 MG capsule TAKE ONE CAPSULE BY MOUTH EVERY DAY 30 capsule 5    simvastatin (ZOCOR) 40 mg tablet TAKE 1 TABLET EVERY DAY 30 tablet 5      No current facility-administered medications on file prior to visit        Social History               Socioeconomic History    Marital status: /Civil Union       Spouse name: Not on file    Number of children: Not on file    Years of education: Not on file    Highest education level: Not on file   Occupational History    Occupation: Full-time employment   Social Needs    Financial resource strain: Not on file    Food insecurity:       Worry: Not on file       Inability: Not on file    Transportation needs:       Medical: Not on file       Non-medical: Not on file   Tobacco Use    Smoking status: Former Smoker    Smokeless tobacco: Never Used    Tobacco comment: History of current every day smoker   Substance and Sexual Activity    Alcohol use:  Yes       Comment: Social/occasional     Drug use: No    Sexual activity: Yes       Partners: Female   Lifestyle    Physical activity:       Days per week: Not on file       Minutes per session: Not on file    Stress: Not on file   Relationships    Social connections:       Talks on phone: Not on file       Gets together: Not on file       Attends [employer] service: Not on file       Active member of club or organization: Not on file       Attends meetings of clubs or organizations: Not on file       Relationship status: Not on file    Intimate partner violence:       Fear of current or ex partner: Not on file       Emotionally abused: Not on file       Physically abused: Not on file       Forced sexual activity: Not on file   Other Topics Concern    Not on file   Social History Narrative    Not on file          I have reviewed the past medical, surgical, social and family history, medications and allergies as documented in the EMR  Review of systems: ROS is negative other than that noted in the HPI          Physical Exam:     Blood pressure 120/73, pulse 60, height 5' 9" (1 753 m), weight 108 kg (239 lb)     General/Constitutional: NAD, well developed, well nourished     Knee Exam (focused):                     RIGHT LEFT   ROM:   0-100 0-130   Palpation: Effusion negative negative     MJL tenderness Positive Negative     LJL tenderness Negative Negative   Meniscus: Christal Negative Negative     Apley's Compression Negative Negative   Instability: Varus stable stable     Valgus stable stable   Special Tests: Lachman Negative Negative     Posterior drawer Negative Negative     Anterior drawer Negative Negative     Pivot shift not tested not tested     Dial not tested not tested   Patella: Palpation no tenderness no tenderness     Mobility 1/4 1/4     Apprehension Negative Negative   Other: Single leg 1/4 squat not tested not tested      LE NV Exam: +2 DP/PT pulses bilaterally  Sensation intact to light touch L2-S1 bilaterally     Bilateral hip ROM demonstrates no pain actively or passively     No calf tenderness to palpation bilaterally     Knee Imaging     X-rays of the right knee were reviewed, which demonstrate severe  osteoarthritis of the medial joint line and mild to moderate osteoarthritis of the patellofemoral joint    I have reviewed the radiology report and agree with their impression        Large joint arthrocentesis  Date/Time: 3/28/2019 3:49 PM  Consent given by: patient  Site marked: site marked  Timeout: Immediately prior to procedure a time out was called to verify the correct patient, procedure, equipment, support staff and site/side marked as required   Supporting Documentation  Indications: pain and joint swelling   Procedure Details  Location: knee -   Preparation: Patient was prepped and draped in the usual sterile fashion  Needle size: 18 G  Ultrasound guidance: no  Approach: lateral  Medications administered: 48 mg hylan 48 MG/6ML    Patient tolerance: patient tolerated the procedure well with no immediate complications  Dressing:  Sterile dressing applied              Darlene Liang PA-C  3/27/2019  9:52 AM  Attested Addendum  Large joint arthrocentesis  Date/Time: 3/27/2019 9:50 AM  Consent given by: patient  Site marked: site marked  Supporting Documentation  Indications: pain   Procedure Details  Needle size: 18 G  Ultrasound guidance: no  Approach: superior  Medications administered: 48 mg hylan 48 MG/6ML          Ortho Sports Medicine Knee New Patient Visit     Assesment:     59year old male Right knee medial joint severe osteoarthritis and mild to moderate osteoarthritis of the patellofemoral joint    Plan:    Viscosupplementation given for right knee    Continue home exercise program    Ice to knee and Tylenol as needed    Let pain guide gradual return to activity    Follow up:    Return if symptoms worsen or fail to improve  Chief Complaint   Patient presents with    Right Knee - Injections       History of Present Illness: The patient is returns for follow up of right knee  Since the prior visit, He reports no significant change in symptoms    Pain is located anterior, medial and deep  The patient rates the pain as a 6/10  The pain has been present for 4 months  The patient denies injury  The pain is characterized as sharp, stabbing and aching  The pain is present most days  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 due to pain and he is nervous about going up and down ladders with his job  He stated that his pain is worse with increased activity  He also stated that his knee bothers him at night and wakes him from sleep    He stated that he believes that he will need a knee replacement at some point, but would like to put that off until the winter time when his job is slower  He also would like to be pain free as much as possible  Pain is improved by sometimes by bracing  Pain is aggravated by weight bearing, walking and standing  Symptoms include clicking, popping and swelling  The patient has tried rest, ice, NSAIDS, bracing and injection  His last injection was a corticosteroid given on 1/28/2019  He stated that the injection lasted for about a month  Knee Surgical History:  None    Past Medical, Social and Family History:  Past Medical History:   Diagnosis Date    Atrial premature complex     last assessed: 05/23/2012    Depression 7/9/2012    Diabetes mellitus ([facility] [address] )     Diverticulitis     GERD (gastroesophageal reflux disease)     Herpes simplex infection     resolved: 07/26/2017    Hyperlipidemia     Hypertension     Nicotine dependence     last assessed: 24/84/7463    Umbilical hernia     last assessed: 07/30/2014     Past Surgical History:   Procedure Laterality Date    BOWEL RESECTION      COLON SIGMOID RESECTION LAPAROSCOPIC      onset: 07/22/2014;  partial-diverticulitis    UMBILICAL HERNIA REPAIR      onset: 07/22/2014     No Known Allergies  Current Outpatient Medications on File Prior to Visit   Medication Sig Dispense Refill    amLODIPine-benazepril (LOTREL) 10-40 MG per capsule TAKE ONE CAPSULE BY MOUTH DAILY 30 capsule 5    aspirin 81 MG tablet Take 81 mg by mouth daily        fluticasone (FLONASE) 50 mcg/act nasal spray 2 sprays into each nostril daily 1 Bottle 1    Glucose Blood (JOHNSON CONTOUR NEXT TEST VI) by In Vitro route 2 (two) times a day      glucose blood (CONTOUR NEXT TEST) test strip Use twice a day to test glucose 100 each 3    Lancets MISC by Does not apply route 2 (two) times a day      metFORMIN (GLUCOPHAGE) 500 mg tablet Take 500 mg by mouth daily with breakfast      metoprolol succinate (TOPROL-XL) 25 mg 24 hr tablet TAKE 1 TABLET BY MOUTH EVERY MORNING TAKE 2 TABLETS BY MOUTH AT BEDTIME (Patient taking differently: TAKE 1 TABLET BY MOUTH EVERY MORNING TAKE 1 TABLETS BY MOUTH DAILY) 90 tablet 1    omeprazole (PriLOSEC) 40 MG capsule TAKE ONE CAPSULE BY MOUTH EVERY DAY 30 capsule 5    simvastatin (ZOCOR) 40 mg tablet TAKE 1 TABLET EVERY DAY 30 tablet 5     No current facility-administered medications on file prior to visit        Social History     Socioeconomic History    Marital status: /Civil Union     Spouse name: Not on file    Number of children: Not on file    Years of education: Not on file    Highest education level: Not on file   Occupational History    Occupation: Full-time employment   Social Needs    Financial resource strain: Not on file    Food insecurity:     Worry: Not on file     Inability: Not on file    Transportation needs:     Medical: Not on file     Non-medical: Not on file   Tobacco Use    Smoking status: Former Smoker    Smokeless tobacco: Never Used    Tobacco comment: History of current every day smoker   Substance and Sexual Activity    Alcohol use: Yes     Comment: Social/occasional     Drug use: No    Sexual activity: Yes     Partners: Female   Lifestyle    Physical activity:     Days per week: Not on file     Minutes per session: Not on file    Stress: Not on file   Relationships    Social connections:     Talks on phone: Not on file     Gets together: Not on file     Attends [employer] service: Not on file     Active member of club or organization: Not on file     Attends meetings of clubs or organizations: Not on file     Relationship status: Not on file    Intimate partner violence:     Fear of current or ex partner: Not on file     Emotionally abused: Not on file     Physically abused: Not on file     Forced sexual activity: Not on file   Other Topics Concern    Not on file   Social History Narrative    Not on file         I have reviewed the past medical, surgical, social and family history, medications and allergies as documented in the EMR  Review of systems: ROS is negative other than that noted in the HPI  Physical Exam:    Blood pressure 120/73, pulse 60, height 5' 9" (1 753 m), weight 108 kg (239 lb)  General/Constitutional: NAD, well developed, well nourished    Knee Exam (focused):                RIGHT LEFT   ROM:   0-100 0-130   Palpation: Effusion negative negative     MJL tenderness Positive Negative     LJL tenderness Negative Negative   Meniscus: Christal Negative Negative    Apley's Compression Negative Negative   Instability: Varus stable stable     Valgus stable stable   Special Tests: Lachman Negative Negative     Posterior drawer Negative Negative     Anterior drawer Negative Negative     Pivot shift not tested not tested     Dial not tested not tested   Patella: Palpation no tenderness no tenderness     Mobility 1/4 1/4     Apprehension Negative Negative   Other: Single leg 1/4 squat not tested not tested      LE NV Exam: +2 DP/PT pulses bilaterally  Sensation intact to light touch L2-S1 bilaterally     Bilateral hip ROM demonstrates no pain actively or passively    No calf tenderness to palpation bilaterally    Knee Imaging    X-rays of the right knee were reviewed, which demonstrate severe  osteoarthritis of the medial joint line and mild to moderate osteoarthritis of the patellofemoral joint  I have reviewed the radiology report and agree with their impression      Attestation signed by Daniel Camargo DO at 3/28/2019  3:47 PM:  I have seen and examined the patient with the PA and agree with their assessment

## 2019-03-27 NOTE — PROGRESS NOTES
Large joint arthrocentesis  Date/Time: 3/27/2019 9:50 AM  Consent given by: patient  Site marked: site marked  Supporting Documentation  Indications: pain   Procedure Details  Needle size: 18 G  Ultrasound guidance: no  Approach: superior  Medications administered: 48 mg hylan 48 MG/6ML          Ortho Sports Medicine Knee New Patient Visit     Assesment:     59year old male Right knee medial joint severe osteoarthritis and mild to moderate osteoarthritis of the patellofemoral joint    Plan:    Viscosupplementation given for right knee    Continue home exercise program    Ice to knee and Tylenol as needed    Let pain guide gradual return to activity    Follow up:    Return if symptoms worsen or fail to improve  Chief Complaint   Patient presents with    Right Knee - Injections       History of Present Illness: The patient is returns for follow up of right knee  Since the prior visit, He reports no significant change in symptoms    Pain is located anterior, medial and deep  The patient rates the pain as a 6/10  The pain has been present for 4 months  The patient denies injury  The pain is characterized as sharp, stabbing and aching  The pain is present most days  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 due to pain and he is nervous about going up and down ladders with his job  He stated that his pain is worse with increased activity  He also stated that his knee bothers him at night and wakes him from sleep  He stated that he believes that he will need a knee replacement at some point, but would like to put that off until the winter time when his job is slower  He also would like to be pain free as much as possible  Pain is improved by sometimes by bracing  Pain is aggravated by weight bearing, walking and standing  Symptoms include clicking, popping and swelling       The patient has tried rest, ice, NSAIDS, bracing and injection  His last injection was a corticosteroid given on 1/28/2019  He stated that the injection lasted for about a month  Knee Surgical History:  None    Past Medical, Social and Family History:  Past Medical History:   Diagnosis Date    Atrial premature complex     last assessed: 05/23/2012    Depression 7/9/2012    Diabetes mellitus ([facility] [address] )     Diverticulitis     GERD (gastroesophageal reflux disease)     Herpes simplex infection     resolved: 07/26/2017    Hyperlipidemia     Hypertension     Nicotine dependence     last assessed: 48/23/1602    Umbilical hernia     last assessed: 07/30/2014     Past Surgical History:   Procedure Laterality Date    BOWEL RESECTION      COLON SIGMOID RESECTION LAPAROSCOPIC      onset: 07/22/2014;  partial-diverticulitis    UMBILICAL HERNIA REPAIR      onset: 07/22/2014     No Known Allergies  Current Outpatient Medications on File Prior to Visit   Medication Sig Dispense Refill    amLODIPine-benazepril (LOTREL) 10-40 MG per capsule TAKE ONE CAPSULE BY MOUTH DAILY 30 capsule 5    aspirin 81 MG tablet Take 81 mg by mouth daily        fluticasone (FLONASE) 50 mcg/act nasal spray 2 sprays into each nostril daily 1 Bottle 1    Glucose Blood ([facility] CONTOUR NEXT TEST VI) by In Vitro route 2 (two) times a day      glucose blood (CONTOUR NEXT TEST) test strip Use twice a day to test glucose 100 each 3    Lancets MISC by Does not apply route 2 (two) times a day      metFORMIN (GLUCOPHAGE) 500 mg tablet Take 500 mg by mouth daily with breakfast      metoprolol succinate (TOPROL-XL) 25 mg 24 hr tablet TAKE 1 TABLET BY MOUTH EVERY MORNING TAKE 2 TABLETS BY MOUTH AT BEDTIME (Patient taking differently: TAKE 1 TABLET BY MOUTH EVERY MORNING TAKE 1 TABLETS BY MOUTH DAILY) 90 tablet 1    omeprazole (PriLOSEC) 40 MG capsule TAKE ONE CAPSULE BY MOUTH EVERY DAY 30 capsule 5    simvastatin (ZOCOR) 40 mg tablet TAKE 1 TABLET EVERY DAY 30 tablet 5     No current facility-administered medications on file prior to visit  Social History     Socioeconomic History    Marital status: /Civil Union     Spouse name: Not on file    Number of children: Not on file    Years of education: Not on file    Highest education level: Not on file   Occupational History    Occupation: Full-time employment   Social Needs    Financial resource strain: Not on file    Food insecurity:     Worry: Not on file     Inability: Not on file    Transportation needs:     Medical: Not on file     Non-medical: Not on file   Tobacco Use    Smoking status: Former Smoker    Smokeless tobacco: Never Used    Tobacco comment: History of current every day smoker   Substance and Sexual Activity    Alcohol use: Yes     Comment: Social/occasional     Drug use: No    Sexual activity: Yes     Partners: Female   Lifestyle    Physical activity:     Days per week: Not on file     Minutes per session: Not on file    Stress: Not on file   Relationships    Social connections:     Talks on phone: Not on file     Gets together: Not on file     Attends [employer] service: Not on file     Active member of club or organization: Not on file     Attends meetings of clubs or organizations: Not on file     Relationship status: Not on file    Intimate partner violence:     Fear of current or ex partner: Not on file     Emotionally abused: Not on file     Physically abused: Not on file     Forced sexual activity: Not on file   Other Topics Concern    Not on file   Social History Narrative    Not on file         I have reviewed the past medical, surgical, social and family history, medications and allergies as documented in the EMR  Review of systems: ROS is negative other than that noted in the HPI  Physical Exam:    Blood pressure 120/73, pulse 60, height 5' 9" (1 753 m), weight 108 kg (239 lb)      General/Constitutional: NAD, well developed, well nourished    Knee Exam (focused):                RIGHT LEFT   ROM:   0-100 0-130   Palpation: Effusion negative negative     MJL tenderness Positive Negative     LJL tenderness Negative Negative   Meniscus: Christal Negative Negative    Apley's Compression Negative Negative   Instability: Varus stable stable     Valgus stable stable   Special Tests: Lachman Negative Negative     Posterior drawer Negative Negative     Anterior drawer Negative Negative     Pivot shift not tested not tested     Dial not tested not tested   Patella: Palpation no tenderness no tenderness     Mobility 1/4 1/4     Apprehension Negative Negative   Other: Single leg 1/4 squat not tested not tested      LE NV Exam: +2 DP/PT pulses bilaterally  Sensation intact to light touch L2-S1 bilaterally     Bilateral hip ROM demonstrates no pain actively or passively    No calf tenderness to palpation bilaterally    Knee Imaging    X-rays of the right knee were reviewed, which demonstrate severe  osteoarthritis of the medial joint line and mild to moderate osteoarthritis of the patellofemoral joint  I have reviewed the radiology report and agree with their impression

## 2019-03-28 PROCEDURE — 20610 DRAIN/INJ JOINT/BURSA W/O US: CPT | Performed by: ORTHOPAEDIC SURGERY

## 2019-03-28 NOTE — PROGRESS NOTES
Ortho Sports Medicine Knee Patient Visit     Assesment:      59year old male Right knee medial joint severe osteoarthritis and mild to moderate osteoarthritis of the patellofemoral joint     Plan:     Viscosupplementation given for right knee     Continue home exercise program     Ice to knee and Tylenol as needed     Let pain guide gradual return to activity     Follow up:     Return if symptoms worsen or fail to improve                Chief Complaint   Patient presents with    Right Knee - Injections         History of Present Illness:     The patient is returns for follow up of right knee  Since the prior visit, He reports no significant change in symptoms     Pain is located anterior, medial and deep  The patient rates the pain as a 6/10  The pain has been present for 4 months        The patient denies injury  The pain is characterized as sharp, stabbing and aching  The pain is present most days  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 due to pain and he is nervous about going up and down ladders with his job  He stated that his pain is worse with increased activity  He also stated that his knee bothers him at night and wakes him from sleep  He stated that he believes that he will need a knee replacement at some point, but would like to put that off until the winter time when his job is slower  He also would like to be pain free as much as possible      Pain is improved by sometimes by bracing  Pain is aggravated by weight bearing, walking and standing  Symptoms include clicking, popping and swelling  The patient has tried rest, ice, NSAIDS, bracing and injection  His last injection was a corticosteroid given on 1/28/2019   He stated that the injection lasted for about a month             Knee Surgical History:  None     Past Medical, Social and Family History:  Medical History   Past Medical History:   Diagnosis Date    Atrial premature complex       last assessed: 05/23/2012    Depression 7/9/2012    Diabetes mellitus ([facility] [address] )      Diverticulitis      GERD (gastroesophageal reflux disease)      Herpes simplex infection       resolved: 07/26/2017    Hyperlipidemia      Hypertension      Nicotine dependence       last assessed: 30/65/9201    Umbilical hernia       last assessed: 07/30/2014         Surgical History         Past Surgical History:   Procedure Laterality Date    BOWEL RESECTION        COLON SIGMOID RESECTION LAPAROSCOPIC         onset: 07/22/2014;  partial-diverticulitis    UMBILICAL HERNIA REPAIR         onset: 07/22/2014         No Known Allergies         Current Outpatient Medications on File Prior to Visit   Medication Sig Dispense Refill    amLODIPine-benazepril (LOTREL) 10-40 MG per capsule TAKE ONE CAPSULE BY MOUTH DAILY 30 capsule 5    aspirin 81 MG tablet Take 81 mg by mouth daily         fluticasone (FLONASE) 50 mcg/act nasal spray 2 sprays into each nostril daily 1 Bottle 1    Glucose Blood (Intacct CONTOUR NEXT TEST VI) by In Vitro route 2 (two) times a day        glucose blood (CONTOUR NEXT TEST) test strip Use twice a day to test glucose 100 each 3    Lancets MISC by Does not apply route 2 (two) times a day        metFORMIN (GLUCOPHAGE) 500 mg tablet Take 500 mg by mouth daily with breakfast        metoprolol succinate (TOPROL-XL) 25 mg 24 hr tablet TAKE 1 TABLET BY MOUTH EVERY MORNING TAKE 2 TABLETS BY MOUTH AT BEDTIME (Patient taking differently: TAKE 1 TABLET BY MOUTH EVERY MORNING TAKE 1 TABLETS BY MOUTH DAILY) 90 tablet 1    omeprazole (PriLOSEC) 40 MG capsule TAKE ONE CAPSULE BY MOUTH EVERY DAY 30 capsule 5    simvastatin (ZOCOR) 40 mg tablet TAKE 1 TABLET EVERY DAY 30 tablet 5      No current facility-administered medications on file prior to visit        Social History               Socioeconomic History    Marital status: /Civil Union       Spouse name: Not on file    Number of children: Not on file    Years of education: Not on file    Highest education level: Not on file   Occupational History    Occupation: Full-time employment   Social Needs    Financial resource strain: Not on file    Food insecurity:       Worry: Not on file       Inability: Not on file    Transportation needs:       Medical: Not on file       Non-medical: Not on file   Tobacco Use    Smoking status: Former Smoker    Smokeless tobacco: Never Used    Tobacco comment: History of current every day smoker   Substance and Sexual Activity    Alcohol use: Yes       Comment: Social/occasional     Drug use: No    Sexual activity: Yes       Partners: Female   Lifestyle    Physical activity:       Days per week: Not on file       Minutes per session: Not on file    Stress: Not on file   Relationships    Social connections:       Talks on phone: Not on file       Gets together: Not on file       Attends Worship service: Not on file       Active member of club or organization: Not on file       Attends meetings of clubs or organizations: Not on file       Relationship status: Not on file    Intimate partner violence:       Fear of current or ex partner: Not on file       Emotionally abused: Not on file       Physically abused: Not on file       Forced sexual activity: Not on file   Other Topics Concern    Not on file   Social History Narrative    Not on file               I have reviewed the past medical, surgical, social and family history, medications and allergies as documented in the EMR  Review of systems: ROS is negative other than that noted in the HPI          Physical Exam:     Blood pressure 120/73, pulse 60, height 5' 9" (1 753 m), weight 108 kg (239 lb)       General/Constitutional: NAD, well developed, well nourished     Knee Exam (focused):                     RIGHT LEFT   ROM:   0-100 0-130   Palpation: Effusion negative negative     MJL tenderness Positive Negative     LJL tenderness Negative Negative   Meniscus: Christal Negative Negative     Apley's Compression Negative Negative   Instability: Varus stable stable     Valgus stable stable   Special Tests: Lachman Negative Negative     Posterior drawer Negative Negative     Anterior drawer Negative Negative     Pivot shift not tested not tested     Dial not tested not tested   Patella: Palpation no tenderness no tenderness     Mobility 1/4 1/4     Apprehension Negative Negative   Other: Single leg 1/4 squat not tested not tested      LE NV Exam: +2 DP/PT pulses bilaterally  Sensation intact to light touch L2-S1 bilaterally     Bilateral hip ROM demonstrates no pain actively or passively     No calf tenderness to palpation bilaterally     Knee Imaging     X-rays of the right knee were reviewed, which demonstrate severe  osteoarthritis of the medial joint line and mild to moderate osteoarthritis of the patellofemoral joint    I have reviewed the radiology report and agree with their impression        Large joint arthrocentesis  Date/Time: 3/28/2019 3:49 PM  Consent given by: patient  Site marked: site marked  Timeout: Immediately prior to procedure a time out was called to verify the correct patient, procedure, equipment, support staff and site/side marked as required   Supporting Documentation  Indications: pain and joint swelling   Procedure Details  Location: knee -   Preparation: Patient was prepped and draped in the usual sterile fashion  Needle size: 18 G  Ultrasound guidance: no  Approach: lateral  Medications administered: 48 mg hylan 48 MG/6ML    Patient tolerance: patient tolerated the procedure well with no immediate complications  Dressing:  Sterile dressing applied

## 2019-04-14 DIAGNOSIS — I10 ESSENTIAL HYPERTENSION: ICD-10-CM

## 2019-04-14 RX ORDER — AMLODIPINE BESYLATE AND BENAZEPRIL HYDROCHLORIDE 10; 40 MG/1; MG/1
CAPSULE ORAL
Qty: 30 CAPSULE | Refills: 5 | Status: SHIPPED | OUTPATIENT
Start: 2019-04-14 | End: 2019-10-16 | Stop reason: SDUPTHER

## 2019-05-03 ENCOUNTER — OFFICE VISIT (OUTPATIENT)
Dept: OBGYN CLINIC | Facility: CLINIC | Age: 59
End: 2019-05-03
Payer: COMMERCIAL

## 2019-05-03 VITALS
BODY MASS INDEX: 35.4 KG/M2 | HEIGHT: 69 IN | DIASTOLIC BLOOD PRESSURE: 75 MMHG | HEART RATE: 62 BPM | SYSTOLIC BLOOD PRESSURE: 129 MMHG | WEIGHT: 239 LBS

## 2019-05-03 DIAGNOSIS — M17.11 PRIMARY OSTEOARTHRITIS OF RIGHT KNEE: Primary | ICD-10-CM

## 2019-05-03 PROCEDURE — 99213 OFFICE O/P EST LOW 20 MIN: CPT | Performed by: ORTHOPAEDIC SURGERY

## 2019-05-05 LAB
ALBUMIN SERPL-MCNC: 4.6 G/DL (ref 3.6–4.8)
ALBUMIN/GLOB SERPL: 1.7 {RATIO} (ref 1.2–2.2)
ALP SERPL-CCNC: 64 IU/L (ref 39–117)
ALT SERPL-CCNC: 23 IU/L (ref 0–44)
AST SERPL-CCNC: 23 IU/L (ref 0–40)
BILIRUB SERPL-MCNC: 0.4 MG/DL (ref 0–1.2)
BUN SERPL-MCNC: 22 MG/DL (ref 8–27)
BUN/CREAT SERPL: 18 (ref 10–24)
CALCIUM SERPL-MCNC: 9.6 MG/DL (ref 8.6–10.2)
CHLORIDE SERPL-SCNC: 102 MMOL/L (ref 96–106)
CO2 SERPL-SCNC: 23 MMOL/L (ref 20–29)
CREAT SERPL-MCNC: 1.21 MG/DL (ref 0.76–1.27)
EST. AVERAGE GLUCOSE BLD GHB EST-MCNC: 128 MG/DL
GLOBULIN SER-MCNC: 2.7 G/DL (ref 1.5–4.5)
GLUCOSE SERPL-MCNC: 132 MG/DL (ref 65–99)
HBA1C MFR BLD: 6.1 % (ref 4.8–5.6)
POTASSIUM SERPL-SCNC: 4.8 MMOL/L (ref 3.5–5.2)
PROT SERPL-MCNC: 7.3 G/DL (ref 6–8.5)
SL AMB EGFR AFRICAN AMERICAN: 74 ML/MIN/1.73
SL AMB EGFR NON AFRICAN AMERICAN: 64 ML/MIN/1.73
SODIUM SERPL-SCNC: 140 MMOL/L (ref 134–144)

## 2019-05-08 ENCOUNTER — OFFICE VISIT (OUTPATIENT)
Dept: FAMILY MEDICINE CLINIC | Facility: HOSPITAL | Age: 59
End: 2019-05-08
Payer: COMMERCIAL

## 2019-05-08 VITALS
DIASTOLIC BLOOD PRESSURE: 62 MMHG | HEIGHT: 69 IN | SYSTOLIC BLOOD PRESSURE: 120 MMHG | BODY MASS INDEX: 34.36 KG/M2 | HEART RATE: 57 BPM | WEIGHT: 232 LBS | TEMPERATURE: 96.4 F

## 2019-05-08 DIAGNOSIS — E78.2 MIXED HYPERLIPIDEMIA: ICD-10-CM

## 2019-05-08 DIAGNOSIS — F41.9 INSOMNIA SECONDARY TO ANXIETY: ICD-10-CM

## 2019-05-08 DIAGNOSIS — I10 ESSENTIAL HYPERTENSION: Primary | ICD-10-CM

## 2019-05-08 DIAGNOSIS — H65.92 SEROUS OTITIS MEDIA WITH RUPTURE OF TYMPANIC MEMBRANE, LEFT: ICD-10-CM

## 2019-05-08 DIAGNOSIS — H72.92 SEROUS OTITIS MEDIA WITH RUPTURE OF TYMPANIC MEMBRANE, LEFT: ICD-10-CM

## 2019-05-08 DIAGNOSIS — M25.561 CHRONIC PAIN OF RIGHT KNEE: ICD-10-CM

## 2019-05-08 DIAGNOSIS — G89.29 CHRONIC PAIN OF RIGHT KNEE: ICD-10-CM

## 2019-05-08 DIAGNOSIS — Z12.5 SCREENING FOR MALIGNANT NEOPLASM OF PROSTATE: ICD-10-CM

## 2019-05-08 DIAGNOSIS — F51.05 INSOMNIA SECONDARY TO ANXIETY: ICD-10-CM

## 2019-05-08 DIAGNOSIS — E11.9 TYPE 2 DIABETES MELLITUS WITHOUT COMPLICATION, WITHOUT LONG-TERM CURRENT USE OF INSULIN (HCC): ICD-10-CM

## 2019-05-08 PROCEDURE — 3074F SYST BP LT 130 MM HG: CPT | Performed by: FAMILY MEDICINE

## 2019-05-08 PROCEDURE — 3078F DIAST BP <80 MM HG: CPT | Performed by: FAMILY MEDICINE

## 2019-05-08 PROCEDURE — 1036F TOBACCO NON-USER: CPT | Performed by: FAMILY MEDICINE

## 2019-05-08 PROCEDURE — 99214 OFFICE O/P EST MOD 30 MIN: CPT | Performed by: FAMILY MEDICINE

## 2019-05-08 RX ORDER — QUETIAPINE FUMARATE 25 MG/1
25 TABLET, FILM COATED ORAL
Qty: 30 TABLET | Refills: 1 | Status: SHIPPED | OUTPATIENT
Start: 2019-05-08 | End: 2019-06-13 | Stop reason: SDUPTHER

## 2019-05-08 RX ORDER — METOPROLOL SUCCINATE 25 MG/1
TABLET, EXTENDED RELEASE ORAL
Qty: 30 TABLET | Refills: 5 | Status: SHIPPED | OUTPATIENT
Start: 2019-05-08 | End: 2019-11-12 | Stop reason: SDUPTHER

## 2019-05-14 DIAGNOSIS — E78.2 MIXED HYPERLIPIDEMIA: ICD-10-CM

## 2019-05-14 RX ORDER — SIMVASTATIN 40 MG
TABLET ORAL
Qty: 30 TABLET | Refills: 5 | Status: SHIPPED | OUTPATIENT
Start: 2019-05-14 | End: 2019-11-12 | Stop reason: SDUPTHER

## 2019-06-12 DIAGNOSIS — K21.00 GASTROESOPHAGEAL REFLUX DISEASE WITH ESOPHAGITIS: ICD-10-CM

## 2019-06-12 RX ORDER — OMEPRAZOLE 40 MG/1
CAPSULE, DELAYED RELEASE ORAL
Qty: 30 CAPSULE | Refills: 5 | Status: SHIPPED | OUTPATIENT
Start: 2019-06-12 | End: 2019-12-11 | Stop reason: SDUPTHER

## 2019-06-13 DIAGNOSIS — F51.05 INSOMNIA SECONDARY TO ANXIETY: ICD-10-CM

## 2019-06-13 DIAGNOSIS — F41.9 INSOMNIA SECONDARY TO ANXIETY: ICD-10-CM

## 2019-06-13 RX ORDER — QUETIAPINE FUMARATE 25 MG/1
TABLET, FILM COATED ORAL
Qty: 30 TABLET | Refills: 3 | Status: SHIPPED | OUTPATIENT
Start: 2019-06-13 | End: 2019-11-12 | Stop reason: SDUPTHER

## 2019-07-29 ENCOUNTER — TELEPHONE (OUTPATIENT)
Dept: OBGYN CLINIC | Facility: HOSPITAL | Age: 59
End: 2019-07-29

## 2019-07-29 NOTE — TELEPHONE ENCOUNTER
Dr Yang  #: [phone]           Patient called in requesting to get another visco injection on R knee  Last time patient received injection was 3/27/19   Please advise, thank you

## 2019-07-30 DIAGNOSIS — M17.11 PRIMARY OSTEOARTHRITIS OF RIGHT KNEE: Primary | ICD-10-CM

## 2019-07-30 NOTE — TELEPHONE ENCOUNTER
Ariel Lamb  This patient requested a visco injection for the right knee, but the last one was in March of 2019  Do you think we can get another one approved? I put the order in the computer  If not would you be able to let the patient know?   Thanks

## 2019-08-06 ENCOUNTER — OFFICE VISIT (OUTPATIENT)
Dept: FAMILY MEDICINE CLINIC | Facility: HOSPITAL | Age: 59
End: 2019-08-06
Payer: COMMERCIAL

## 2019-08-06 VITALS
DIASTOLIC BLOOD PRESSURE: 72 MMHG | WEIGHT: 226 LBS | HEART RATE: 77 BPM | OXYGEN SATURATION: 97 % | TEMPERATURE: 97.5 F | HEIGHT: 69 IN | SYSTOLIC BLOOD PRESSURE: 134 MMHG | BODY MASS INDEX: 33.47 KG/M2

## 2019-08-06 DIAGNOSIS — J06.9 UPPER RESPIRATORY TRACT INFECTION, UNSPECIFIED TYPE: ICD-10-CM

## 2019-08-06 DIAGNOSIS — J02.9 PHARYNGITIS, UNSPECIFIED ETIOLOGY: Primary | ICD-10-CM

## 2019-08-06 LAB — S PYO AG THROAT QL: NEGATIVE

## 2019-08-06 PROCEDURE — 99214 OFFICE O/P EST MOD 30 MIN: CPT | Performed by: NURSE PRACTITIONER

## 2019-08-06 PROCEDURE — 1036F TOBACCO NON-USER: CPT | Performed by: NURSE PRACTITIONER

## 2019-08-06 PROCEDURE — 3008F BODY MASS INDEX DOCD: CPT | Performed by: NURSE PRACTITIONER

## 2019-08-06 PROCEDURE — 87880 STREP A ASSAY W/OPTIC: CPT | Performed by: NURSE PRACTITIONER

## 2019-08-06 RX ORDER — PREDNISONE 10 MG/1
TABLET ORAL
Qty: 16 TABLET | Refills: 0 | Status: SHIPPED | OUTPATIENT
Start: 2019-08-06 | End: 2019-09-12 | Stop reason: ALTCHOICE

## 2019-08-06 NOTE — PATIENT INSTRUCTIONS
Pharyngitis   AMBULATORY CARE:   Pharyngitis , or sore throat, is inflammation of the tissues and structures in your pharynx (throat)  Pharyngitis is most often caused by bacteria  It may also be caused by a cold or flu virus  Other causes include smoking, allergies, or acid reflux  Signs and symptoms that may occur with pharyngitis:   · Sore throat or pain when you swallow    · Fever, chills, and body aches    · Hoarse or raspy voice    · Cough, runny or stuffy nose, itchy or watery eyes    · Headache    · Upset stomach and loss of appetite    · Mild neck stiffness    · Swollen glands that feel like hard lumps when you touch your neck    · White and yellow pus-filled blisters in the back of your throat  Call 911 for any of the following:   · You have trouble breathing or swallowing because your throat is swollen or sore  Seek care immediately if:   · You are drooling because it hurts too much to swallow  · Your fever is higher than 102? F (39?C) or lasts longer than 3 days  · You are confused  · You taste blood in your throat  Contact your healthcare provider if:   · Your throat pain gets worse  · You have a painful lump in your throat that does not go away after 5 days  · Your symptoms do not improve after 5 days  · You have questions or concerns about your condition or care  Treatment for pharyngitis:  Viral pharyngitis will go away on its own without treatment  Your sore throat should start to feel better in 3 to 5 days for both viral and bacterial infections  You may need any of the following:  · Antibiotics  treat a bacterial infection  · NSAIDs , such as ibuprofen, help decrease swelling, pain, and fever  NSAIDs can cause stomach bleeding or kidney problems in certain people  If you take blood thinner medicine, always ask your healthcare provider if NSAIDs are safe for you  Always read the medicine label and follow directions  · Acetaminophen  decreases pain and fever   It is available without a doctor's order  Ask how much to take and how often to take it  Follow directions  Acetaminophen can cause liver damage if not taken correctly  Manage your symptoms:   · Gargle salt water  Mix ¼ teaspoon salt in an 8 ounce glass of warm water and gargle  This may help decrease swelling in your throat  · Drink liquids as directed  You may need to drink more liquids than usual  Liquids may help soothe your throat and prevent dehydration  Ask how much liquid to drink each day and which liquids are best for you  · Use a cool-steam humidifier  to help moisten the air in your room and calm your cough  · Soothe your throat  with cough drops, ice, soft foods, or popsicles  Prevent the spread of pharyngitis:  Cover your mouth and nose when you cough or sneeze  Do not share food or drinks  Wash your hands often  Use soap and water  If soap and water are unavailable, use an alcohol based hand   Follow up with your healthcare provider as directed:  Write down your questions so you remember to ask them during your visits  © 2017 [facility] Information is for End User's use only and may not be sold, redistributed or otherwise used for commercial purposes  All illustrations and images included in CareNotes® are the copyrighted property of [employer]  or [employer]  The above information is an  only  It is not intended as medical advice for individual conditions or treatments  Talk to your doctor, nurse or pharmacist before following any medical regimen to see if it is safe and effective for you

## 2019-08-06 NOTE — PROGRESS NOTES
Assessment/Plan:    No problem-specific Assessment & Plan notes found for this encounter  Diagnoses and all orders for this visit:    Pharyngitis, unspecified etiology  Comments:  probable viral illness given neg rapid strep, culture sent to confirm; use prednisone taper as rx'd, gargle, soothing fluids, and diet as tolerated  Orders:  -     predniSONE 10 mg tablet; Take 4 tablets today then 3 tablets for 2 days, 2 tablets for 2 days, and 1 tablet for 2 days    Upper respiratory tract infection, unspecified type  -     POCT rapid strepA  -     Throat culture          Subjective:      Patient ID: Karissa Villa is a 62 y o  male here for an acute visit  Has had a bad sore throat x3 days  Worse last night overnight  No meds taken  Started on left side under ear, now lower in throat  Doesn't feel bad otherwise  No ill contacts  Non smoker (history of)  Denies GERD symptoms w/omeprazole daily  The following portions of the patient's history were reviewed and updated as appropriate: allergies, current medications, past medical history, past social history, past surgical history and problem list     Review of Systems   Constitutional: Negative for appetite change and fever  HENT: Positive for sore throat and trouble swallowing  Negative for congestion, ear pain and rhinorrhea  Respiratory: Negative for cough  Gastrointestinal: Negative for abdominal pain, nausea and vomiting  Objective:      /72 (Patient Position: Sitting, Cuff Size: Large)   Pulse 77   Temp 97 5 °F (36 4 °C) (Tympanic)   Ht 5' 9" (1 753 m)   Wt 103 kg (226 lb)   SpO2 97%   BMI 33 37 kg/m²          Physical Exam   Constitutional: He is oriented to person, place, and time  He appears well-developed and well-nourished  No distress  HENT:   Head: Normocephalic and atraumatic     Right Ear: Hearing and tympanic membrane normal    Left Ear: Hearing and tympanic membrane normal    Mouth/Throat: Uvula swelling present  Posterior oropharyngeal erythema present  No oropharyngeal exudate  Tonsils are 1+ on the right  Tonsils are 1+ on the left  No tonsillar exudate  Cardiovascular: Normal rate and regular rhythm  Pulmonary/Chest: Effort normal  No respiratory distress  Lymphadenopathy:     He has no cervical adenopathy  Neurological: He is alert and oriented to person, place, and time  Skin: Skin is warm and dry  Psychiatric: He has a normal mood and affect  His behavior is normal    Vitals reviewed

## 2019-08-08 LAB — B-HEM STREP SPEC QL CULT: NEGATIVE

## 2019-08-08 NOTE — TELEPHONE ENCOUNTER
Ariel Lilly  We will not be able to get an approval yet for visco injections, they re-approve injections every 6 months  I will call the patient and explain the process  I called the patient and left message explaining that the insurance would not cover injections sooner then 6 months  we will work on the request once it gets closer to 9/27/19

## 2019-09-12 ENCOUNTER — OFFICE VISIT (OUTPATIENT)
Dept: CARDIOLOGY CLINIC | Facility: CLINIC | Age: 59
End: 2019-09-12
Payer: COMMERCIAL

## 2019-09-12 VITALS
SYSTOLIC BLOOD PRESSURE: 122 MMHG | HEART RATE: 56 BPM | HEIGHT: 69 IN | DIASTOLIC BLOOD PRESSURE: 66 MMHG | WEIGHT: 230 LBS | BODY MASS INDEX: 34.07 KG/M2

## 2019-09-12 DIAGNOSIS — I10 ESSENTIAL HYPERTENSION: Primary | ICD-10-CM

## 2019-09-12 PROCEDURE — 3078F DIAST BP <80 MM HG: CPT | Performed by: INTERNAL MEDICINE

## 2019-09-12 PROCEDURE — 3074F SYST BP LT 130 MM HG: CPT | Performed by: INTERNAL MEDICINE

## 2019-09-12 PROCEDURE — 93000 ELECTROCARDIOGRAM COMPLETE: CPT | Performed by: INTERNAL MEDICINE

## 2019-09-12 PROCEDURE — 99214 OFFICE O/P EST MOD 30 MIN: CPT | Performed by: INTERNAL MEDICINE

## 2019-09-12 NOTE — PROGRESS NOTES
Cardiology Follow Up    Kristian Deluna  1960  [id number]  [address] [facility] Evlyn Bosworth  [address] [place] [id number] [phone]    1  Essential hypertension  POCT ECG       Interval History: Followup PVCs    Has occasional palpitations at night  Otherwise doing well  Problem List     BPH with obstruction/lower urinary tract symptoms    Chronic pain of right knee    Depression    Type 2 diabetes mellitus without complication, without long-term current use of insulin ([facility] [address] )    Overview Signed 4/10/2018  4:44 PM by Moris Lange MD     Transitioned From: Hyperglycemia         Lab Results   Component Value Date    HGBA1C 6 1 (H) 05/04/2019       No results for input(s): POCGLU in the last 72 hours      Blood Sugar Average: Last 72 hrs:          Diverticulosis of large intestine without perforation or abscess without bleeding    Esophagitis, reflux    Mixed hyperlipidemia    Essential hypertension    Class 2 severe obesity due to excess calories with serious comorbidity and body mass index (BMI) of 35 0 to 35 9 in adult [facility])    Obstructive sleep apnea    Heart palpitations    Persistent insomnia    Myalgia due to statin    Bradycardia    Symptomatic PVCs    Rectal bleeding    History of colonic polyps    Gastroesophageal reflux disease without esophagitis    Diverticulitis    Diverticular disease of colon    Colon cancer screening    Painful cough    Primary osteoarthritis of right knee    Serous otitis media with rupture of tympanic membrane, left    Insomnia secondary to anxiety        Past Medical History:   Diagnosis Date    Atrial premature complex     last assessed: 05/23/2012    Depression 7/9/2012    Diabetes mellitus ([facility] [address] )     Diverticulitis     GERD (gastroesophageal reflux disease)     Herpes simplex infection     resolved: 07/26/2017    Hyperlipidemia     Hypertension     Nicotine dependence last assessed: 19/51/0313    Umbilical hernia     last assessed: 07/30/2014     Social History     Socioeconomic History    Marital status: /Civil Union     Spouse name: Not on file    Number of children: Not on file    Years of education: Not on file    Highest education level: Not on file   Occupational History    Occupation: Full-time employment   Social Needs    Financial resource strain: Not on file    Food insecurity:     Worry: Not on file     Inability: Not on file    Transportation needs:     Medical: Not on file     Non-medical: Not on file   Tobacco Use    Smoking status: Former Smoker    Smokeless tobacco: Never Used    Tobacco comment: History of current every day smoker   Substance and Sexual Activity    Alcohol use: Yes     Comment: Social/occasional     Drug use: No    Sexual activity: Yes     Partners: Female   Lifestyle    Physical activity:     Days per week: Not on file     Minutes per session: Not on file    Stress: Not on file   Relationships    Social connections:     Talks on phone: Not on file     Gets together: Not on file     Attends Sabianism service: Not on file     Active member of club or organization: Not on file     Attends meetings of clubs or organizations: Not on file     Relationship status: Not on file    Intimate partner violence:     Fear of current or ex partner: Not on file     Emotionally abused: Not on file     Physically abused: Not on file     Forced sexual activity: Not on file   Other Topics Concern    Not on file   Social History Narrative    Not on file      Family History   Problem Relation Age of Onset    Depression Mother     Heart attack Father         acute    Depression Sister     Depression Sister      Past Surgical History:   Procedure Laterality Date    BOWEL RESECTION      COLON SIGMOID RESECTION LAPAROSCOPIC      onset: 07/22/2014;  partial-diverticulitis    UMBILICAL HERNIA REPAIR      onset: 07/22/2014       Current Outpatient Medications:     amLODIPine-benazepril (LOTREL) 10-40 MG per capsule, TAKE ONE CAPSULE BY MOUTH DAILY, Disp: 30 capsule, Rfl: 5    aspirin 81 MG tablet, Take 40 5 mg by mouth daily , Disp: , Rfl:     Glucose Blood (JOHNSON CONTOUR NEXT TEST VI), by In Vitro route 2 (two) times a day, Disp: , Rfl:     glucose blood (CONTOUR NEXT TEST) test strip, Use twice a day to test glucose, Disp: 100 each, Rfl: 3    Lancets MISC, by Does not apply route 2 (two) times a day, Disp: , Rfl:     metoprolol succinate (TOPROL-XL) 25 mg 24 hr tablet, Take one tablet daily, Disp: 30 tablet, Rfl: 5    omeprazole (PriLOSEC) 40 MG capsule, TAKE ONE CAPSULE BY MOUTH EVERY DAY, Disp: 30 capsule, Rfl: 5    QUEtiapine (SEROquel) 25 mg tablet, TAKE 1 TABLET BY MOUTH DAILY AT BEDTIME, Disp: 30 tablet, Rfl: 3    simvastatin (ZOCOR) 40 mg tablet, TAKE 1 TABLET EVERY DAY, Disp: 30 tablet, Rfl: 5  No Known Allergies    Labs:     Chemistry        Component Value Date/Time     11/21/2017 0948    K 4 8 05/04/2019 0722     05/04/2019 0722    CO2 23 05/04/2019 0722    BUN 22 05/04/2019 0722    CREATININE 1 21 05/04/2019 0722    CREATININE 1 09 11/21/2017 0948        Component Value Date/Time    CALCIUM 9 6 11/21/2017 0948    ALKPHOS 63 11/21/2017 0948    AST 23 05/04/2019 0722    ALT 23 05/04/2019 0722    BILITOT 0 4 11/21/2017 0948            Lab Results   Component Value Date    CHOL 146 11/21/2017    CHOL 177 07/24/2017    CHOL 172 12/07/2016     Lab Results   Component Value Date    HDL 47 01/03/2019    HDL 43 09/12/2018    HDL 46 04/06/2018     Lab Results   Component Value Date    LDLCALC 80 11/21/2017    LDLCALC 99 07/24/2017    LDLCALC 105 (H) 12/07/2016     Lab Results   Component Value Date    TRIG 166 (H) 01/03/2019    TRIG 151 (H) 09/12/2018    TRIG 163 (H) 04/06/2018     Lab Results   Component Value Date    CHOLHDL 3 8 04/06/2018       Imaging: No results found  EKG: NSR   Normal ECG    Review of Systems Constitution: Negative  HENT: Negative  Eyes: Negative  Cardiovascular: Positive for irregular heartbeat  Respiratory: Negative  Endocrine: Negative  Hematologic/Lymphatic: Negative  Skin: Negative  Musculoskeletal: Negative  Gastrointestinal: Negative  Genitourinary: Negative  Neurological: Negative  Psychiatric/Behavioral: Negative  Allergic/Immunologic: Negative  Vitals:    09/12/19 1125   BP: 122/66   Pulse: 56           Physical Exam   Constitutional: He is oriented to person, place, and time  HENT:   Mouth/Throat: No oropharyngeal exudate  Eyes: No scleral icterus  Neck: No JVD present  Cardiovascular: Normal rate and regular rhythm  Exam reveals no gallop and no friction rub  No murmur heard  Pulmonary/Chest: Effort normal and breath sounds normal  No stridor  No respiratory distress  He has no wheezes  Abdominal: Soft  Bowel sounds are normal  He exhibits no distension  There is no tenderness  There is no guarding  Musculoskeletal: He exhibits no edema  Neurological: He is alert and oriented to person, place, and time  Skin: He is not diaphoretic  Psychiatric: He has a normal mood and affect  His behavior is normal        Discussion/Summary:    1  PVCs: He is symptomatic at rest  He has a hx of longstanding PVCs  At this point continue Metoprolol  LVEF is normal       2  HTN: His BP is well controlled today  3  Dyslipidemia: Continue simvastatin  Mild coronary atherosclerosis on his coronary calcium score  The patient was counseled regarding diagnostic results, instructions for management, risk factor reductions, impressions  total time of encounter was 25 minutes and 15 minutes was spent counseling

## 2019-09-18 LAB
ALBUMIN SERPL-MCNC: 4.6 G/DL (ref 3.6–4.8)
ALBUMIN/GLOB SERPL: 1.8 {RATIO} (ref 1.2–2.2)
ALP SERPL-CCNC: 66 IU/L (ref 39–117)
ALT SERPL-CCNC: 16 IU/L (ref 0–44)
AST SERPL-CCNC: 19 IU/L (ref 0–40)
BILIRUB SERPL-MCNC: 0.5 MG/DL (ref 0–1.2)
BUN SERPL-MCNC: 20 MG/DL (ref 8–27)
BUN/CREAT SERPL: 17 (ref 10–24)
CALCIUM SERPL-MCNC: 9.9 MG/DL (ref 8.6–10.2)
CHLORIDE SERPL-SCNC: 102 MMOL/L (ref 96–106)
CHOLEST SERPL-MCNC: 190 MG/DL (ref 100–199)
CO2 SERPL-SCNC: 23 MMOL/L (ref 20–29)
CREAT SERPL-MCNC: 1.16 MG/DL (ref 0.76–1.27)
EST. AVERAGE GLUCOSE BLD GHB EST-MCNC: 128 MG/DL
GLOBULIN SER-MCNC: 2.6 G/DL (ref 1.5–4.5)
GLUCOSE SERPL-MCNC: 111 MG/DL (ref 65–99)
HBA1C MFR BLD: 6.1 % (ref 4.8–5.6)
HDLC SERPL-MCNC: 46 MG/DL
LDLC SERPL CALC-MCNC: 110 MG/DL (ref 0–99)
LDLC/HDLC SERPL: 2.4 RATIO (ref 0–3.6)
POTASSIUM SERPL-SCNC: 4.7 MMOL/L (ref 3.5–5.2)
PROT SERPL-MCNC: 7.2 G/DL (ref 6–8.5)
PSA SERPL-MCNC: 2.4 NG/ML (ref 0–4)
SL AMB EGFR AFRICAN AMERICAN: 78 ML/MIN/1.73
SL AMB EGFR NON AFRICAN AMERICAN: 68 ML/MIN/1.73
SL AMB REFLEX CRITERIA: NORMAL
SL AMB VLDL CHOLESTEROL CALC: 34 MG/DL (ref 5–40)
SODIUM SERPL-SCNC: 141 MMOL/L (ref 134–144)
TRIGL SERPL-MCNC: 172 MG/DL (ref 0–149)
TSH SERPL DL<=0.005 MIU/L-ACNC: 2.44 UIU/ML (ref 0.45–4.5)

## 2019-09-20 ENCOUNTER — OFFICE VISIT (OUTPATIENT)
Dept: FAMILY MEDICINE CLINIC | Facility: HOSPITAL | Age: 59
End: 2019-09-20
Payer: COMMERCIAL

## 2019-09-20 VITALS
SYSTOLIC BLOOD PRESSURE: 136 MMHG | BODY MASS INDEX: 34.07 KG/M2 | HEART RATE: 57 BPM | WEIGHT: 230 LBS | TEMPERATURE: 97.8 F | HEIGHT: 69 IN | DIASTOLIC BLOOD PRESSURE: 70 MMHG

## 2019-09-20 DIAGNOSIS — I10 ESSENTIAL HYPERTENSION: Primary | ICD-10-CM

## 2019-09-20 DIAGNOSIS — E78.2 MIXED HYPERLIPIDEMIA: ICD-10-CM

## 2019-09-20 DIAGNOSIS — I49.3 SYMPTOMATIC PVCS: ICD-10-CM

## 2019-09-20 DIAGNOSIS — E11.9 TYPE 2 DIABETES MELLITUS WITHOUT COMPLICATION, WITHOUT LONG-TERM CURRENT USE OF INSULIN (HCC): ICD-10-CM

## 2019-09-20 PROCEDURE — 99214 OFFICE O/P EST MOD 30 MIN: CPT | Performed by: FAMILY MEDICINE

## 2019-09-20 NOTE — PROGRESS NOTES
Assessment/Plan:         Diagnoses and all orders for this visit:    Essential hypertension  Comments:  Excellent BP    Orders:  -     Comprehensive metabolic panel; Future  -     Comprehensive metabolic panel    Type 2 diabetes mellitus without complication, without long-term current use of insulin (HCC)  Comments:  Stable DM control A1c 6 1  Orders:  -     HEMOGLOBIN A1C W/ EAG ESTIMATION; Future  -     Microalbumin / creatinine urine ratio; Future  -     HEMOGLOBIN A1C W/ EAG ESTIMATION  -     Microalbumin / creatinine urine ratio    Mixed hyperlipidemia  Comments:  Excellent lipid profile  Orders:  -     Lipid Panel with Direct LDL reflex; Future  -     Lipid Panel with Direct LDL reflex    Symptomatic PVCs  Comments:  Doing well on Metoprolol          Subjective:      Patient ID: Jenn Love is a 62 y o  male  4 month follow up  Feeling well overall  No recent illness or injury    Not testing his own glucoses unless he doesn't feel right  R knee had good response to Synvisc 6 months ago      The following portions of the patient's history were reviewed and updated as appropriate: allergies, current medications, past family history, past medical history, past social history, past surgical history and problem list     Review of Systems   Constitutional: Negative for activity change, appetite change and unexpected weight change  Eyes: Negative for visual disturbance  Respiratory: Negative  Cardiovascular: Negative  Musculoskeletal: Positive for joint swelling  Neurological: Negative  All other systems reviewed and are negative  Objective:      /70   Pulse 57   Temp 97 8 °F (36 6 °C)   Ht 5' 9" (1 753 m)   Wt 104 kg (230 lb)   BMI 33 97 kg/m²          Physical Exam   Constitutional: He is oriented to person, place, and time  He appears well-developed and well-nourished  Neck: No thyromegaly present     Cardiovascular: Normal rate, regular rhythm, normal heart sounds and intact distal pulses  Pulses are no weak pulses  Pulses:       Dorsalis pedis pulses are 2+ on the right side, and 2+ on the left side  Posterior tibial pulses are 2+ on the right side, and 2+ on the left side  Pulmonary/Chest: Effort normal and breath sounds normal    Musculoskeletal: He exhibits no edema  Feet:   Right Foot:   Skin Integrity: Negative for ulcer, skin breakdown, erythema, warmth, callus or dry skin  Left Foot:   Skin Integrity: Negative for ulcer, skin breakdown, erythema, warmth, callus or dry skin  Neurological: He is alert and oriented to person, place, and time  Nursing note and vitals reviewed  Patient's shoes and socks removed  Right Foot/Ankle   Right Foot Inspection  Skin Exam: skin normal and skin intact no dry skin, no warmth, no callus, no erythema, no maceration, no abnormal color, no pre-ulcer, no ulcer and no callus                          Toe Exam: ROM and strength within normal limits  Sensory   Vibration: intact  Proprioception: intact   Monofilament testing: intact  Vascular  Capillary refills: < 3 seconds  The right DP pulse is 2+  The right PT pulse is 2+  Left Foot/Ankle  Left Foot Inspection  Skin Exam: skin normal and skin intactno dry skin, no warmth, no erythema, no maceration, normal color, no pre-ulcer, no ulcer and no callus                         Toe Exam: ROM and strength within normal limits                   Sensory   Vibration: intact  Proprioception: intact  Monofilament: intact  Vascular  Capillary refills: < 3 seconds  The left DP pulse is 2+  The left PT pulse is 2+  Assign Risk Category:  No deformity present; No loss of protective sensation;  No weak pulses       Risk: 0

## 2019-10-16 DIAGNOSIS — I10 ESSENTIAL HYPERTENSION: ICD-10-CM

## 2019-10-16 RX ORDER — AMLODIPINE BESYLATE AND BENAZEPRIL HYDROCHLORIDE 10; 40 MG/1; MG/1
CAPSULE ORAL
Qty: 30 CAPSULE | Refills: 5 | Status: SHIPPED | OUTPATIENT
Start: 2019-10-16 | End: 2020-04-06

## 2019-11-12 DIAGNOSIS — I10 ESSENTIAL HYPERTENSION: ICD-10-CM

## 2019-11-12 DIAGNOSIS — F41.9 INSOMNIA SECONDARY TO ANXIETY: ICD-10-CM

## 2019-11-12 DIAGNOSIS — F51.05 INSOMNIA SECONDARY TO ANXIETY: ICD-10-CM

## 2019-11-12 DIAGNOSIS — E78.2 MIXED HYPERLIPIDEMIA: ICD-10-CM

## 2019-11-12 RX ORDER — METOPROLOL SUCCINATE 25 MG/1
TABLET, EXTENDED RELEASE ORAL
Qty: 30 TABLET | Refills: 5 | Status: SHIPPED | OUTPATIENT
Start: 2019-11-12 | End: 2020-06-05

## 2019-11-12 RX ORDER — QUETIAPINE FUMARATE 25 MG/1
TABLET, FILM COATED ORAL
Qty: 30 TABLET | Refills: 3 | Status: SHIPPED | OUTPATIENT
Start: 2019-11-12 | End: 2020-03-07

## 2019-11-12 RX ORDER — SIMVASTATIN 40 MG
TABLET ORAL
Qty: 30 TABLET | Refills: 5 | Status: SHIPPED | OUTPATIENT
Start: 2019-11-12 | End: 2020-05-03

## 2019-11-15 DIAGNOSIS — E11.9 DM TYPE 2 WITHOUT RETINOPATHY (HCC): ICD-10-CM

## 2019-11-15 RX ORDER — BLOOD SUGAR DIAGNOSTIC
STRIP MISCELLANEOUS
Qty: 100 EACH | Refills: 3 | Status: SHIPPED | OUTPATIENT
Start: 2019-11-15 | End: 2021-01-29 | Stop reason: SDUPTHER

## 2019-12-04 ENCOUNTER — TELEPHONE (OUTPATIENT)
Dept: OBGYN CLINIC | Facility: HOSPITAL | Age: 59
End: 2019-12-04

## 2019-12-04 NOTE — TELEPHONE ENCOUNTER
TO BE COMPLETED BY CENTRAL AUTH TEAM:     Physician: DR Kika Wagner     Medication: SYNVISC ONE    Number of Injections in Series (Appointments scheduled 1 week apart from one another): 1    Schedule after this date: UPON AVAILABILITY    Billing Info: Buy and Bill/Specialty Pharmacy-Patient Supply>> BUY & BILL    Appointment Message Line:  (please copy and paste appointment message line when scheduling appointment) RIGHT [facility]    Additional Comments:

## 2019-12-11 DIAGNOSIS — K21.00 GASTROESOPHAGEAL REFLUX DISEASE WITH ESOPHAGITIS: ICD-10-CM

## 2019-12-11 RX ORDER — OMEPRAZOLE 40 MG/1
CAPSULE, DELAYED RELEASE ORAL
Qty: 30 CAPSULE | Refills: 5 | Status: SHIPPED | OUTPATIENT
Start: 2019-12-11 | End: 2020-06-05

## 2019-12-16 LAB
LEFT EYE DIABETIC RETINOPATHY: NORMAL
RIGHT EYE DIABETIC RETINOPATHY: NORMAL

## 2019-12-16 PROCEDURE — 2023F DILAT RTA XM W/O RTNOPTHY: CPT | Performed by: FAMILY MEDICINE

## 2019-12-18 ENCOUNTER — TELEPHONE (OUTPATIENT)
Dept: OBGYN CLINIC | Facility: CLINIC | Age: 59
End: 2019-12-18

## 2019-12-19 DIAGNOSIS — M17.11 PRIMARY OSTEOARTHRITIS OF RIGHT KNEE: Primary | ICD-10-CM

## 2019-12-19 NOTE — TELEPHONE ENCOUNTER
Dr Yang   #: [phone]           Patient is calling in requesting a call back  Patient will like to schedule his visco injection in the beginning of February  He will like to know how long it takes for the injection to kick in?  Please advise, thank you
I placed an ordered for the visco 1 time injection   Thank you
Patient advised that a month before his trip would be good to get his visco injection  He would love the single injection again, it worked great last time    Thanks
APER

## 2019-12-31 ENCOUNTER — OFFICE VISIT (OUTPATIENT)
Dept: FAMILY MEDICINE CLINIC | Facility: HOSPITAL | Age: 59
End: 2019-12-31
Payer: COMMERCIAL

## 2019-12-31 VITALS
HEIGHT: 69 IN | DIASTOLIC BLOOD PRESSURE: 62 MMHG | OXYGEN SATURATION: 96 % | TEMPERATURE: 98.7 F | WEIGHT: 235.4 LBS | SYSTOLIC BLOOD PRESSURE: 112 MMHG | HEART RATE: 64 BPM | BODY MASS INDEX: 34.87 KG/M2

## 2019-12-31 DIAGNOSIS — R19.8 ABNORMAL BOWEL MOVEMENT: ICD-10-CM

## 2019-12-31 DIAGNOSIS — R10.30 LOWER ABDOMINAL PAIN: Primary | ICD-10-CM

## 2019-12-31 LAB
SL AMB  POCT GLUCOSE, UA: NORMAL
SL AMB LEUKOCYTE ESTERASE,UA: NORMAL
SL AMB POCT BILIRUBIN,UA: NORMAL
SL AMB POCT BLOOD,UA: NORMAL
SL AMB POCT CLARITY,UA: NORMAL
SL AMB POCT COLOR,UA: YELLOW
SL AMB POCT KETONES,UA: NORMAL
SL AMB POCT NITRITE,UA: NORMAL
SL AMB POCT PH,UA: 5
SL AMB POCT SPECIFIC GRAVITY,UA: 1.01
SL AMB POCT URINE PROTEIN: NORMAL
SL AMB POCT UROBILINOGEN: NORMAL

## 2019-12-31 PROCEDURE — 99214 OFFICE O/P EST MOD 30 MIN: CPT | Performed by: NURSE PRACTITIONER

## 2019-12-31 PROCEDURE — 81002 URINALYSIS NONAUTO W/O SCOPE: CPT | Performed by: NURSE PRACTITIONER

## 2019-12-31 NOTE — PROGRESS NOTES
Assessment/Plan:    Will evaluate symptoms further w/labs drawn in office  Order given to collect stool sample r/o c-diff  UA dip in office normal   Offered antispasmodic rx but he declines need for  Continue bland diet and push liquids  May need GI pending results and persistence of symptoms  Diagnoses and all orders for this visit:    Lower abdominal pain  -     CBC and differential  -     Cancel: Comprehensive metabolic panel  -     Sedimentation rate, automated  -     Clostridium difficile toxin by PCR; Future  -     Clostridium difficile toxin by PCR  -     Comprehensive metabolic panel    Abnormal bowel movement          Subjective:      Patient ID: Tila Berman is a 62 y o  male  Started w/diarrhea on [place] day (1 week ago) every half hour and lasted x3 days  Has subsided but still has stomach ache and still has yellow/orange stools (not diarrhea)  Poor appetite  No recent travel  Ate rare steak day this started  No recent antibiotic  The following portions of the patient's history were reviewed and updated as appropriate: allergies, current medications, past medical history, past social history, past surgical history and problem list     Review of Systems   Constitutional: Positive for appetite change (poor appetite)  Negative for activity change, chills, fever and unexpected weight change  Respiratory: Negative for cough and shortness of breath  Gastrointestinal: Positive for abdominal distention, abdominal pain, diarrhea and nausea (minor, brief spells)  Negative for blood in stool and vomiting  Genitourinary: Negative for decreased urine volume, difficulty urinating, dysuria and hematuria           Objective:      /62 (Patient Position: Sitting, Cuff Size: Large)   Pulse 64   Temp 98 7 °F (37 1 °C) (Tympanic)   Ht 5' 9" (1 753 m)   Wt 107 kg (235 lb 6 4 oz)   SpO2 96%   BMI 34 76 kg/m²          Physical Exam   Constitutional: He is oriented to person, place, and time  He appears well-developed and well-nourished  No distress  HENT:   Head: Normocephalic and atraumatic  Eyes: No scleral icterus  Cardiovascular: Normal rate and regular rhythm  Pulmonary/Chest: Effort normal and breath sounds normal  No respiratory distress  Abdominal: Soft  Normal appearance and bowel sounds are normal  He exhibits no mass  There is no hepatosplenomegaly  There is generalized tenderness  There is no rigidity, no rebound and no guarding  obese   Neurological: He is alert and oriented to person, place, and time  Skin: Skin is warm and dry  No rash noted  Psychiatric: He has a normal mood and affect  His behavior is normal    Vitals reviewed

## 2020-01-01 LAB
ALBUMIN SERPL-MCNC: 4.2 G/DL (ref 3.6–4.8)
ALBUMIN/GLOB SERPL: 1.6 {RATIO} (ref 1.2–2.2)
ALP SERPL-CCNC: 60 IU/L (ref 39–117)
ALT SERPL-CCNC: 20 IU/L (ref 0–44)
AST SERPL-CCNC: 16 IU/L (ref 0–40)
BASOPHILS # BLD AUTO: 0.1 X10E3/UL (ref 0–0.2)
BASOPHILS NFR BLD AUTO: 1 %
BILIRUB SERPL-MCNC: <0.2 MG/DL (ref 0–1.2)
BUN SERPL-MCNC: 14 MG/DL (ref 8–27)
BUN/CREAT SERPL: 13 (ref 10–24)
CALCIUM SERPL-MCNC: 9.2 MG/DL (ref 8.6–10.2)
CHLORIDE SERPL-SCNC: 109 MMOL/L (ref 96–106)
CO2 SERPL-SCNC: 22 MMOL/L (ref 20–29)
CREAT SERPL-MCNC: 1.05 MG/DL (ref 0.76–1.27)
EOSINOPHIL # BLD AUTO: 0.3 X10E3/UL (ref 0–0.4)
EOSINOPHIL NFR BLD AUTO: 4 %
ERYTHROCYTE [DISTWIDTH] IN BLOOD BY AUTOMATED COUNT: 13.1 % (ref 12.3–15.4)
ERYTHROCYTE [SEDIMENTATION RATE] IN BLOOD BY WESTERGREN METHOD: 33 MM/HR (ref 0–30)
GLOBULIN SER-MCNC: 2.6 G/DL (ref 1.5–4.5)
GLUCOSE SERPL-MCNC: 128 MG/DL (ref 65–99)
HCT VFR BLD AUTO: 38.6 % (ref 37.5–51)
HGB BLD-MCNC: 13.2 G/DL (ref 13–17.7)
IMM GRANULOCYTES # BLD: 0.1 X10E3/UL (ref 0–0.1)
IMM GRANULOCYTES NFR BLD: 1 %
LYMPHOCYTES # BLD AUTO: 1.8 X10E3/UL (ref 0.7–3.1)
LYMPHOCYTES NFR BLD AUTO: 28 %
MCH RBC QN AUTO: 29.8 PG (ref 26.6–33)
MCHC RBC AUTO-ENTMCNC: 34.2 G/DL (ref 31.5–35.7)
MCV RBC AUTO: 87 FL (ref 79–97)
MONOCYTES # BLD AUTO: 0.5 X10E3/UL (ref 0.1–0.9)
MONOCYTES NFR BLD AUTO: 8 %
MORPHOLOGY BLD-IMP: NORMAL
NEUTROPHILS # BLD AUTO: 3.9 X10E3/UL (ref 1.4–7)
NEUTROPHILS NFR BLD AUTO: 58 %
PLATELET # BLD AUTO: 263 X10E3/UL (ref 150–450)
POTASSIUM SERPL-SCNC: 4.4 MMOL/L (ref 3.5–5.2)
PROT SERPL-MCNC: 6.8 G/DL (ref 6–8.5)
RBC # BLD AUTO: 4.43 X10E6/UL (ref 4.14–5.8)
SL AMB EGFR AFRICAN AMERICAN: 88 ML/MIN/1.73
SL AMB EGFR NON AFRICAN AMERICAN: 76 ML/MIN/1.73
SODIUM SERPL-SCNC: 144 MMOL/L (ref 134–144)
WBC # BLD AUTO: 6.6 X10E3/UL (ref 3.4–10.8)

## 2020-01-03 ENCOUNTER — OFFICE VISIT (OUTPATIENT)
Dept: OBGYN CLINIC | Facility: CLINIC | Age: 60
End: 2020-01-03
Payer: COMMERCIAL

## 2020-01-03 VITALS
DIASTOLIC BLOOD PRESSURE: 78 MMHG | HEIGHT: 70 IN | BODY MASS INDEX: 33.64 KG/M2 | SYSTOLIC BLOOD PRESSURE: 122 MMHG | WEIGHT: 235 LBS

## 2020-01-03 DIAGNOSIS — M17.11 PRIMARY OSTEOARTHRITIS OF RIGHT KNEE: Primary | ICD-10-CM

## 2020-01-03 PROCEDURE — 20610 DRAIN/INJ JOINT/BURSA W/O US: CPT | Performed by: ORTHOPAEDIC SURGERY

## 2020-01-03 NOTE — PROGRESS NOTES
Ortho Sports Medicine Knee Follow Up Visit     Assesment:   58-year-old male Right knee severe medial and moderate patellofemoral OA    Plan:    Conservative treatment:    Ice to knee for 20 minutes at least 1-2 times daily  OTC NSAIDS prn for pain  Imaging:    No imaging was available for review today  Injection:    The risks and benefits of the injection (which include but are not limited to: infection, bleeding,damage to nerve/artery, need for further intervention), as well as the risks and benefits of all alternative treatments were explained and understood  The patient elected to proceed with injection  The procedure was done with aseptic technique, and the patient tolerated the procedure well with no complications  A viscosupplementation injection was performed  Ice to the knee 1-2 times daily for 20 minutes, for next 24-48 hrs  Surgery:     No surgery is recommended at this point, continue with conservative treatment plan as noted  Follow up:    Return if symptoms worsen or fail to improve  Chief Complaint   Patient presents with    Right Knee - Injections       History of Present Illness: The patient is returns for follow up of right knee pain presenting for Visco injection  He last received a Visco injection March which has given him significant relief up to this point  He states that it did not completely relieve his pain is still has days where he experienced dull achy pain in the medial aspect of the knee  But has had great relief with the injection  He states that he is going on a fishing trip later in February would like to receive an injection for the trip     Pain is located medial      Pain is improved by rest and injection  Pain is aggravated by weight bearing  Symptoms include swelling  The patient has tried rest, ice, NSAIDS, physical therapy, bracing and injection              I have reviewed the past medical, surgical, social and family history, medications and allergies as documented in the EMR  Review of systems: ROS is negative other than that noted in the HPI  Constitutional: Negative for fatigue and fever  Physical Exam:    Blood pressure 122/78, height 5' 10" (1 778 m), weight 107 kg (235 lb)  General/Constitutional: NAD, well developed, well nourished  HENT: Normocephalic, atraumatic  CV: Intact distal pulses, regular rate  Resp: No respiratory distress or labored breathing  Lymphatic: No lymphadenopathy palpated  Neuro: Alert and Oriented x 3, no focal deficits  Psych: Normal mood, normal affect, normal judgement, normal behavior  Skin: Warm, dry, no rashes, no erythema      Knee Exam (focused): RIGHT LEFT   ROM:   0-130 0-130   Palpation: Effusion negative negative     MJL tenderness Positive Negative     LJL tenderness Negative Negative   Meniscus:  Christal Negative Negative    Apley's Compression Negative Negative   Instability: Varus stable stable     Valgus stable stable   Special Tests: Lachman Negative Negative     Posterior drawer Negative Negative     Anterior drawer Negative Negative     Pivot shift not tested not tested     Dial not tested not tested   Patella: Palpation no tenderness no tenderness     Mobility 1/4 1/4     Apprehension Negative Negative   Other: Single leg 1/4 squat not tested not tested           LE NV Exam: +2 DP/PT pulses bilaterally  Sensation intact to light touch L2-S1 bilaterally    No calf tenderness to palpation bilaterally      Knee Imaging    No imaging was performed today    Large joint arthrocentesis: R knee  Date/Time: 1/3/2020 3:01 PM  Consent given by: patient  Site marked: site marked  Timeout: Immediately prior to procedure a time out was called to verify the correct patient, procedure, equipment, support staff and site/side marked as required   Supporting Documentation  Indications: pain   Procedure Details  Location: knee - R knee  Preparation: Patient was prepped and draped in the usual sterile fashion  Needle size: 22 G  Ultrasound guidance: no  Approach: superior  Medications administered: 48 mg hylan 48 MG/6ML    Patient tolerance: patient tolerated the procedure well with no immediate complications  Dressing:  Sterile dressing applied

## 2020-01-07 DIAGNOSIS — R10.84 GENERALIZED ABDOMINAL PAIN: Primary | ICD-10-CM

## 2020-01-07 DIAGNOSIS — R19.8 ABNORMAL BOWEL MOVEMENT: ICD-10-CM

## 2020-01-09 ENCOUNTER — HOSPITAL ENCOUNTER (OUTPATIENT)
Dept: ULTRASOUND IMAGING | Facility: CLINIC | Age: 60
Discharge: HOME | End: 2020-01-09
Payer: COMMERCIAL

## 2020-01-09 DIAGNOSIS — R19.8 ABNORMAL BOWEL MOVEMENT: ICD-10-CM

## 2020-01-09 DIAGNOSIS — R10.84 GENERALIZED ABDOMINAL PAIN: ICD-10-CM

## 2020-01-09 PROCEDURE — 76700 US EXAM ABDOM COMPLETE: CPT

## 2020-01-11 LAB — C DIFF TOX GENS STL QL NAA+PROBE: NEGATIVE

## 2020-01-21 LAB
ALBUMIN SERPL-MCNC: 4.7 G/DL (ref 3.8–4.8)
ALBUMIN/CREAT UR: 15 MG/G CREAT (ref 0–29)
ALBUMIN/GLOB SERPL: 1.6 {RATIO} (ref 1.2–2.2)
ALP SERPL-CCNC: 67 IU/L (ref 39–117)
ALT SERPL-CCNC: 19 IU/L (ref 0–44)
AST SERPL-CCNC: 19 IU/L (ref 0–40)
BILIRUB SERPL-MCNC: 0.6 MG/DL (ref 0–1.2)
BUN SERPL-MCNC: 22 MG/DL (ref 8–27)
BUN/CREAT SERPL: 18 (ref 10–24)
CALCIUM SERPL-MCNC: 9.9 MG/DL (ref 8.6–10.2)
CHLORIDE SERPL-SCNC: 101 MMOL/L (ref 96–106)
CHOLEST SERPL-MCNC: 166 MG/DL (ref 100–199)
CO2 SERPL-SCNC: 21 MMOL/L (ref 20–29)
CREAT SERPL-MCNC: 1.24 MG/DL (ref 0.76–1.27)
CREAT UR-MCNC: 55.8 MG/DL
EST. AVERAGE GLUCOSE BLD GHB EST-MCNC: 131 MG/DL
GLOBULIN SER-MCNC: 2.9 G/DL (ref 1.5–4.5)
GLUCOSE SERPL-MCNC: 122 MG/DL (ref 65–99)
HBA1C MFR BLD: 6.2 % (ref 4.8–5.6)
HDLC SERPL-MCNC: 40 MG/DL
LDLC SERPL CALC-MCNC: 104 MG/DL (ref 0–99)
LDLC/HDLC SERPL: 2.6 RATIO (ref 0–3.6)
MICROALBUMIN UR-MCNC: 8.1 UG/ML
POTASSIUM SERPL-SCNC: 4.5 MMOL/L (ref 3.5–5.2)
PROT SERPL-MCNC: 7.6 G/DL (ref 6–8.5)
SL AMB EGFR AFRICAN AMERICAN: 72 ML/MIN/1.73
SL AMB EGFR NON AFRICAN AMERICAN: 62 ML/MIN/1.73
SL AMB VLDL CHOLESTEROL CALC: 22 MG/DL (ref 5–40)
SODIUM SERPL-SCNC: 140 MMOL/L (ref 134–144)
TRIGL SERPL-MCNC: 111 MG/DL (ref 0–149)

## 2020-01-21 PROCEDURE — 3061F NEG MICROALBUMINURIA REV: CPT | Performed by: FAMILY MEDICINE

## 2020-01-21 PROCEDURE — 3044F HG A1C LEVEL LT 7.0%: CPT | Performed by: FAMILY MEDICINE

## 2020-01-24 ENCOUNTER — OFFICE VISIT (OUTPATIENT)
Dept: FAMILY MEDICINE CLINIC | Facility: HOSPITAL | Age: 60
End: 2020-01-24
Payer: COMMERCIAL

## 2020-01-24 VITALS
SYSTOLIC BLOOD PRESSURE: 120 MMHG | TEMPERATURE: 97.7 F | DIASTOLIC BLOOD PRESSURE: 70 MMHG | HEIGHT: 70 IN | BODY MASS INDEX: 33.36 KG/M2 | HEART RATE: 60 BPM | WEIGHT: 233 LBS

## 2020-01-24 DIAGNOSIS — Z11.4 SCREENING FOR HIV (HUMAN IMMUNODEFICIENCY VIRUS): ICD-10-CM

## 2020-01-24 DIAGNOSIS — R73.03 PREDIABETES: ICD-10-CM

## 2020-01-24 DIAGNOSIS — E66.09 CLASS 1 OBESITY DUE TO EXCESS CALORIES WITH SERIOUS COMORBIDITY AND BODY MASS INDEX (BMI) OF 33.0 TO 33.9 IN ADULT: ICD-10-CM

## 2020-01-24 DIAGNOSIS — I10 ESSENTIAL HYPERTENSION: Primary | ICD-10-CM

## 2020-01-24 DIAGNOSIS — E13.9 RENAL CYSTS AND DIABETES SYNDROME (HCC): ICD-10-CM

## 2020-01-24 DIAGNOSIS — K76.0 FATTY LIVER: ICD-10-CM

## 2020-01-24 DIAGNOSIS — E78.2 MIXED HYPERLIPIDEMIA: ICD-10-CM

## 2020-01-24 DIAGNOSIS — H93.13 TINNITUS OF BOTH EARS: ICD-10-CM

## 2020-01-24 PROCEDURE — 1036F TOBACCO NON-USER: CPT | Performed by: FAMILY MEDICINE

## 2020-01-24 PROCEDURE — 99214 OFFICE O/P EST MOD 30 MIN: CPT | Performed by: FAMILY MEDICINE

## 2020-01-24 PROCEDURE — 3078F DIAST BP <80 MM HG: CPT | Performed by: FAMILY MEDICINE

## 2020-01-24 PROCEDURE — 3074F SYST BP LT 130 MM HG: CPT | Performed by: FAMILY MEDICINE

## 2020-01-24 NOTE — PROGRESS NOTES
Assessment/Plan:         Diagnoses and all orders for this visit:    Essential hypertension  Comments:  Excellent BP control  Orders:  -     CBC and differential; Future  -     CBC and differential    Mixed hyperlipidemia  Comments:  Very good lipid profile on statin  Orders:  -     Lipid Panel with Direct LDL reflex; Future  -     Lipid Panel with Direct LDL reflex    Prediabetes  Comments:  Urged consistent weight control and carb avoidance  Orders:  -     Comprehensive metabolic panel; Future  -     HEMOGLOBIN A1C W/ EAG ESTIMATION; Future  -     Comprehensive metabolic panel  -     HEMOGLOBIN A1C W/ EAG ESTIMATION    Class 1 obesity due to excess calories with serious comorbidity and body mass index (BMI) of 33 0 to 33 9 in adult    Tinnitus of both ears    Fatty liver  Comments:  Reviewed CT result, mild hepatic findings with normal LFT's    Renal cysts and diabetes syndrome (HCC)  Comments:  Benign appearance    Screening for HIV (human immunodeficiency virus)  -     [facility]- Human Immunodeficiency Virus 1/2 Antigen / Antibody ( Fourth Generation) with Reflex Testing; Future          Subjective:      Patient ID: Lulú Lyon is a 62 y o  male  4 month follow up    Has ongoing tinnitus and has occasional very brief absence of tinnitus but comes right back    Seen by Abundio Hooks earlier this month for GI issues, now resolved  Saw Dr Tabby Welsh for knee DJD    Labs reviewed and copy given to patient  The following portions of the patient's history were reviewed and updated as appropriate: allergies, current medications, past family history, past medical history, past social history, past surgical history and problem list     Review of Systems   Constitutional: Negative for unexpected weight change  HENT: Positive for tinnitus  Negative for hearing loss  Respiratory: Negative  Cardiovascular: Negative  Gastrointestinal: Negative      Musculoskeletal: Positive for arthralgias and joint swelling  Neurological: Negative for dizziness and headaches  Psychiatric/Behavioral: Negative  All other systems reviewed and are negative  Objective:      /70   Pulse 60   Temp 97 7 °F (36 5 °C)   Ht 5' 10" (1 778 m)   Wt 106 kg (233 lb)   BMI 33 43 kg/m²          Physical Exam   Constitutional: He is oriented to person, place, and time  He appears well-developed and well-nourished  HENT:   Head: Normocephalic  Right Ear: External ear normal    Left Ear: External ear normal    Mouth/Throat: Oropharynx is clear and moist    Neck: No thyromegaly present  Cardiovascular: Normal rate, regular rhythm, normal heart sounds and intact distal pulses  Pulmonary/Chest: Effort normal and breath sounds normal    Musculoskeletal: He exhibits no edema  Neurological: He is alert and oriented to person, place, and time  Psychiatric: He has a normal mood and affect  His behavior is normal    Nursing note and vitals reviewed

## 2020-02-12 ENCOUNTER — TELEPHONE (OUTPATIENT)
Dept: OBGYN CLINIC | Facility: HOSPITAL | Age: 60
End: 2020-02-12

## 2020-02-12 DIAGNOSIS — M17.11 PRIMARY OSTEOARTHRITIS OF RIGHT KNEE: Primary | ICD-10-CM

## 2020-02-12 RX ORDER — NAPROXEN 500 MG/1
500 TABLET ORAL 2 TIMES DAILY WITH MEALS
Qty: 60 TABLET | Refills: 0 | Status: SHIPPED | OUTPATIENT
Start: 2020-02-12 | End: 2020-05-08 | Stop reason: ALTCHOICE

## 2020-02-12 NOTE — TELEPHONE ENCOUNTER
Hi, please call patient and state that Trey sent an inflammatory medication to the pharmacy on file  This medication can be taken twice a day with meals, Naproxen  30 days given  When he returns from [place] we would happy to see him in the office   Thank you

## 2020-02-12 NOTE — TELEPHONE ENCOUNTER
Patient sees Dr Jah Watters    He got a VISCO injection 1/3/20 and his right   knee has gotten progressively worse  Said his pain level is at a 7  He is leaving for [place] tomorrow for 3 weeks and is afraid he won't be able to walk  The inside of his knee is very tender  He is asking if there is anything he can do for pain relief, perhaps be prescribed an anti-inflammatory      Mari Miguel #[phone]

## 2020-02-12 NOTE — TELEPHONE ENCOUNTER
Notified pt  He states he took the Naproxen already and is not helping  Advised that it can take sometime but he still wants me to let you know that he still in pain and wants to know what else with help him  He also stated that he shouldn't never got the shot because it just made it worse

## 2020-03-07 DIAGNOSIS — F41.9 INSOMNIA SECONDARY TO ANXIETY: ICD-10-CM

## 2020-03-07 DIAGNOSIS — F51.05 INSOMNIA SECONDARY TO ANXIETY: ICD-10-CM

## 2020-03-07 RX ORDER — QUETIAPINE FUMARATE 25 MG/1
TABLET, FILM COATED ORAL
Qty: 30 TABLET | Refills: 3 | Status: SHIPPED | OUTPATIENT
Start: 2020-03-07 | End: 2020-06-26 | Stop reason: SDUPTHER

## 2020-04-02 ENCOUNTER — TELEPHONE (OUTPATIENT)
Dept: FAMILY MEDICINE CLINIC | Facility: HOSPITAL | Age: 60
End: 2020-04-02

## 2020-04-06 DIAGNOSIS — I10 ESSENTIAL HYPERTENSION: ICD-10-CM

## 2020-04-06 RX ORDER — AMLODIPINE BESYLATE AND BENAZEPRIL HYDROCHLORIDE 10; 40 MG/1; MG/1
CAPSULE ORAL
Qty: 30 CAPSULE | Refills: 5 | Status: SHIPPED | OUTPATIENT
Start: 2020-04-06 | End: 2020-10-02

## 2020-05-03 DIAGNOSIS — E78.2 MIXED HYPERLIPIDEMIA: ICD-10-CM

## 2020-05-03 RX ORDER — SIMVASTATIN 40 MG
TABLET ORAL
Qty: 30 TABLET | Refills: 5 | Status: SHIPPED | OUTPATIENT
Start: 2020-05-03 | End: 2020-10-29

## 2020-05-08 ENCOUNTER — OFFICE VISIT (OUTPATIENT)
Dept: CARDIOLOGY CLINIC | Facility: CLINIC | Age: 60
End: 2020-05-08
Payer: COMMERCIAL

## 2020-05-08 VITALS
BODY MASS INDEX: 32.53 KG/M2 | SYSTOLIC BLOOD PRESSURE: 122 MMHG | WEIGHT: 227.2 LBS | HEART RATE: 64 BPM | DIASTOLIC BLOOD PRESSURE: 62 MMHG | HEIGHT: 70 IN

## 2020-05-08 DIAGNOSIS — I49.3 SYMPTOMATIC PVCS: Primary | ICD-10-CM

## 2020-05-08 DIAGNOSIS — I10 ESSENTIAL HYPERTENSION: ICD-10-CM

## 2020-05-08 PROCEDURE — 3078F DIAST BP <80 MM HG: CPT | Performed by: INTERNAL MEDICINE

## 2020-05-08 PROCEDURE — 1036F TOBACCO NON-USER: CPT | Performed by: INTERNAL MEDICINE

## 2020-05-08 PROCEDURE — 3008F BODY MASS INDEX DOCD: CPT | Performed by: INTERNAL MEDICINE

## 2020-05-08 PROCEDURE — 99214 OFFICE O/P EST MOD 30 MIN: CPT | Performed by: INTERNAL MEDICINE

## 2020-05-08 PROCEDURE — 3074F SYST BP LT 130 MM HG: CPT | Performed by: INTERNAL MEDICINE

## 2020-05-08 PROCEDURE — 2022F DILAT RTA XM EVC RTNOPTHY: CPT | Performed by: INTERNAL MEDICINE

## 2020-05-19 LAB
ALBUMIN SERPL-MCNC: 4.4 G/DL (ref 3.8–4.8)
ALBUMIN/GLOB SERPL: 1.6 {RATIO} (ref 1.2–2.2)
ALP SERPL-CCNC: 68 IU/L (ref 39–117)
ALT SERPL-CCNC: 17 IU/L (ref 0–44)
AST SERPL-CCNC: 18 IU/L (ref 0–40)
BASOPHILS # BLD AUTO: 0.1 X10E3/UL (ref 0–0.2)
BASOPHILS NFR BLD AUTO: 1 %
BILIRUB SERPL-MCNC: 0.2 MG/DL (ref 0–1.2)
BUN SERPL-MCNC: 21 MG/DL (ref 8–27)
BUN/CREAT SERPL: 21 (ref 10–24)
CALCIUM SERPL-MCNC: 9.3 MG/DL (ref 8.6–10.2)
CHLORIDE SERPL-SCNC: 101 MMOL/L (ref 96–106)
CHOLEST SERPL-MCNC: 172 MG/DL (ref 100–199)
CO2 SERPL-SCNC: 22 MMOL/L (ref 20–29)
CREAT SERPL-MCNC: 0.98 MG/DL (ref 0.76–1.27)
EOSINOPHIL # BLD AUTO: 0.4 X10E3/UL (ref 0–0.4)
EOSINOPHIL NFR BLD AUTO: 5 %
ERYTHROCYTE [DISTWIDTH] IN BLOOD BY AUTOMATED COUNT: 12.8 % (ref 11.6–15.4)
EST. AVERAGE GLUCOSE BLD GHB EST-MCNC: 128 MG/DL
GLOBULIN SER-MCNC: 2.7 G/DL (ref 1.5–4.5)
GLUCOSE SERPL-MCNC: 115 MG/DL (ref 65–99)
HBA1C MFR BLD: 6.1 % (ref 4.8–5.6)
HCT VFR BLD AUTO: 41.1 % (ref 37.5–51)
HDLC SERPL-MCNC: 45 MG/DL
HGB BLD-MCNC: 13.5 G/DL (ref 13–17.7)
HIV 1+2 AB+HIV1 P24 AG SERPL QL IA: NON REACTIVE
IMM GRANULOCYTES # BLD: 0 X10E3/UL (ref 0–0.1)
IMM GRANULOCYTES NFR BLD: 0 %
LDLC SERPL CALC-MCNC: 97 MG/DL (ref 0–99)
LDLC/HDLC SERPL: 2.2 RATIO (ref 0–3.6)
LYMPHOCYTES # BLD AUTO: 2.2 X10E3/UL (ref 0.7–3.1)
LYMPHOCYTES NFR BLD AUTO: 30 %
MCH RBC QN AUTO: 29.5 PG (ref 26.6–33)
MCHC RBC AUTO-ENTMCNC: 32.8 G/DL (ref 31.5–35.7)
MCV RBC AUTO: 90 FL (ref 79–97)
MONOCYTES # BLD AUTO: 0.7 X10E3/UL (ref 0.1–0.9)
MONOCYTES NFR BLD AUTO: 9 %
NEUTROPHILS # BLD AUTO: 4 X10E3/UL (ref 1.4–7)
NEUTROPHILS NFR BLD AUTO: 55 %
PLATELET # BLD AUTO: 262 X10E3/UL (ref 150–450)
POTASSIUM SERPL-SCNC: 4 MMOL/L (ref 3.5–5.2)
PROT SERPL-MCNC: 7.1 G/DL (ref 6–8.5)
RBC # BLD AUTO: 4.57 X10E6/UL (ref 4.14–5.8)
SL AMB EGFR AFRICAN AMERICAN: 95 ML/MIN/1.73
SL AMB EGFR NON AFRICAN AMERICAN: 82 ML/MIN/1.73
SL AMB VLDL CHOLESTEROL CALC: 30 MG/DL (ref 5–40)
SODIUM SERPL-SCNC: 137 MMOL/L (ref 134–144)
TRIGL SERPL-MCNC: 152 MG/DL (ref 0–149)
WBC # BLD AUTO: 7.4 X10E3/UL (ref 3.4–10.8)

## 2020-05-19 PROCEDURE — 3044F HG A1C LEVEL LT 7.0%: CPT | Performed by: FAMILY MEDICINE

## 2020-05-27 ENCOUNTER — OFFICE VISIT (OUTPATIENT)
Dept: FAMILY MEDICINE CLINIC | Facility: HOSPITAL | Age: 60
End: 2020-05-27
Payer: COMMERCIAL

## 2020-05-27 VITALS
TEMPERATURE: 97.4 F | SYSTOLIC BLOOD PRESSURE: 126 MMHG | HEIGHT: 70 IN | BODY MASS INDEX: 32.5 KG/M2 | WEIGHT: 227 LBS | HEART RATE: 59 BPM | DIASTOLIC BLOOD PRESSURE: 68 MMHG

## 2020-05-27 DIAGNOSIS — R73.03 PREDIABETES: ICD-10-CM

## 2020-05-27 DIAGNOSIS — I49.3 SYMPTOMATIC PVCS: ICD-10-CM

## 2020-05-27 DIAGNOSIS — Z12.5 SCREENING FOR MALIGNANT NEOPLASM OF PROSTATE: ICD-10-CM

## 2020-05-27 DIAGNOSIS — I10 ESSENTIAL HYPERTENSION: Primary | ICD-10-CM

## 2020-05-27 DIAGNOSIS — E78.2 MIXED HYPERLIPIDEMIA: ICD-10-CM

## 2020-05-27 PROCEDURE — 1036F TOBACCO NON-USER: CPT | Performed by: FAMILY MEDICINE

## 2020-05-27 PROCEDURE — 3008F BODY MASS INDEX DOCD: CPT | Performed by: FAMILY MEDICINE

## 2020-05-27 PROCEDURE — 2022F DILAT RTA XM EVC RTNOPTHY: CPT | Performed by: FAMILY MEDICINE

## 2020-05-27 PROCEDURE — 3078F DIAST BP <80 MM HG: CPT | Performed by: FAMILY MEDICINE

## 2020-05-27 PROCEDURE — 3074F SYST BP LT 130 MM HG: CPT | Performed by: FAMILY MEDICINE

## 2020-05-27 PROCEDURE — 99214 OFFICE O/P EST MOD 30 MIN: CPT | Performed by: FAMILY MEDICINE

## 2020-06-05 DIAGNOSIS — K21.00 GASTROESOPHAGEAL REFLUX DISEASE WITH ESOPHAGITIS: ICD-10-CM

## 2020-06-05 DIAGNOSIS — I10 ESSENTIAL HYPERTENSION: ICD-10-CM

## 2020-06-05 RX ORDER — OMEPRAZOLE 40 MG/1
CAPSULE, DELAYED RELEASE ORAL
Qty: 30 CAPSULE | Refills: 5 | Status: SHIPPED | OUTPATIENT
Start: 2020-06-05 | End: 2020-12-15

## 2020-06-05 RX ORDER — METOPROLOL SUCCINATE 25 MG/1
TABLET, EXTENDED RELEASE ORAL
Qty: 30 TABLET | Refills: 5 | Status: SHIPPED | OUTPATIENT
Start: 2020-06-05 | End: 2020-12-15

## 2020-06-26 ENCOUNTER — TELEPHONE (OUTPATIENT)
Dept: FAMILY MEDICINE CLINIC | Facility: HOSPITAL | Age: 60
End: 2020-06-26

## 2020-06-26 DIAGNOSIS — F41.9 INSOMNIA SECONDARY TO ANXIETY: ICD-10-CM

## 2020-06-26 DIAGNOSIS — F51.05 INSOMNIA SECONDARY TO ANXIETY: ICD-10-CM

## 2020-06-26 RX ORDER — QUETIAPINE FUMARATE 50 MG/1
50 TABLET, FILM COATED ORAL
Qty: 30 TABLET | Refills: 5 | Status: SHIPPED | OUTPATIENT
Start: 2020-06-26 | End: 2020-09-28 | Stop reason: SDUPTHER

## 2020-07-07 DIAGNOSIS — F51.05 INSOMNIA SECONDARY TO ANXIETY: ICD-10-CM

## 2020-07-07 DIAGNOSIS — F41.9 INSOMNIA SECONDARY TO ANXIETY: ICD-10-CM

## 2020-07-07 RX ORDER — QUETIAPINE FUMARATE 25 MG/1
TABLET, FILM COATED ORAL
Qty: 30 TABLET | Refills: 3 | OUTPATIENT
Start: 2020-07-07

## 2020-07-15 ENCOUNTER — TELEPHONE (OUTPATIENT)
Dept: OBGYN CLINIC | Facility: HOSPITAL | Age: 60
End: 2020-07-15

## 2020-07-15 NOTE — TELEPHONE ENCOUNTER
Patient of DR Kinjal Evangelista  RE: R knee gel injection  # [phone]    Patient called asking to speak to Dr Yang/clinical regarding ordering R knee Synvisc    Per patient, his knee is not bothering him   Patient would like to know if he should still get the gel injection to "stay ahead" of the pain, or if he should wait until his knee starts to bother him         Confirmed insurance as [employer] for [employer] of gel injection

## 2020-09-24 LAB
ALBUMIN SERPL-MCNC: 4.7 G/DL (ref 3.8–4.8)
ALBUMIN/GLOB SERPL: 1.9 {RATIO} (ref 1.2–2.2)
ALP SERPL-CCNC: 67 IU/L (ref 39–117)
ALT SERPL-CCNC: 21 IU/L (ref 0–44)
AST SERPL-CCNC: 24 IU/L (ref 0–40)
BILIRUB SERPL-MCNC: 0.5 MG/DL (ref 0–1.2)
BUN SERPL-MCNC: 21 MG/DL (ref 8–27)
BUN/CREAT SERPL: 18 (ref 10–24)
CALCIUM SERPL-MCNC: 10 MG/DL (ref 8.6–10.2)
CHLORIDE SERPL-SCNC: 102 MMOL/L (ref 96–106)
CHOLEST SERPL-MCNC: 184 MG/DL (ref 100–199)
CO2 SERPL-SCNC: 23 MMOL/L (ref 20–29)
CREAT SERPL-MCNC: 1.19 MG/DL (ref 0.76–1.27)
EST. AVERAGE GLUCOSE BLD GHB EST-MCNC: 126 MG/DL
GLOBULIN SER-MCNC: 2.5 G/DL (ref 1.5–4.5)
GLUCOSE SERPL-MCNC: 121 MG/DL (ref 65–99)
HBA1C MFR BLD: 6 % (ref 4.8–5.6)
HDLC SERPL-MCNC: 48 MG/DL
LDLC SERPL CALC-MCNC: 110 MG/DL (ref 0–99)
LDLC/HDLC SERPL: 2.3 RATIO (ref 0–3.6)
POTASSIUM SERPL-SCNC: 5 MMOL/L (ref 3.5–5.2)
PROT SERPL-MCNC: 7.2 G/DL (ref 6–8.5)
PSA SERPL-MCNC: 4 NG/ML (ref 0–4)
SL AMB % FREE PSA: 18.3 %
SL AMB EGFR AFRICAN AMERICAN: 75 ML/MIN/1.73
SL AMB EGFR NON AFRICAN AMERICAN: 65 ML/MIN/1.73
SL AMB PSA, FREE: 0.73 NG/ML
SL AMB REFLEX CRITERIA: NORMAL
SL AMB VLDL CHOLESTEROL CALC: 26 MG/DL (ref 5–40)
SODIUM SERPL-SCNC: 139 MMOL/L (ref 134–144)
TRIGL SERPL-MCNC: 145 MG/DL (ref 0–149)
TSH SERPL DL<=0.005 MIU/L-ACNC: 1.19 UIU/ML (ref 0.45–4.5)

## 2020-09-24 PROCEDURE — 3044F HG A1C LEVEL LT 7.0%: CPT | Performed by: FAMILY MEDICINE

## 2020-09-28 ENCOUNTER — OFFICE VISIT (OUTPATIENT)
Dept: FAMILY MEDICINE CLINIC | Facility: HOSPITAL | Age: 60
End: 2020-09-28
Payer: COMMERCIAL

## 2020-09-28 VITALS
SYSTOLIC BLOOD PRESSURE: 136 MMHG | HEART RATE: 67 BPM | DIASTOLIC BLOOD PRESSURE: 70 MMHG | BODY MASS INDEX: 34.07 KG/M2 | TEMPERATURE: 97.8 F | HEIGHT: 70 IN | WEIGHT: 238 LBS

## 2020-09-28 DIAGNOSIS — R73.03 PREDIABETES: ICD-10-CM

## 2020-09-28 DIAGNOSIS — K21.9 GASTROESOPHAGEAL REFLUX DISEASE WITHOUT ESOPHAGITIS: ICD-10-CM

## 2020-09-28 DIAGNOSIS — G47.33 OBSTRUCTIVE SLEEP APNEA: ICD-10-CM

## 2020-09-28 DIAGNOSIS — E78.2 MIXED HYPERLIPIDEMIA: ICD-10-CM

## 2020-09-28 DIAGNOSIS — F41.9 INSOMNIA SECONDARY TO ANXIETY: ICD-10-CM

## 2020-09-28 DIAGNOSIS — E66.09 CLASS 1 OBESITY DUE TO EXCESS CALORIES WITH SERIOUS COMORBIDITY AND BODY MASS INDEX (BMI) OF 34.0 TO 34.9 IN ADULT: ICD-10-CM

## 2020-09-28 DIAGNOSIS — Z23 ENCOUNTER FOR IMMUNIZATION: ICD-10-CM

## 2020-09-28 DIAGNOSIS — F51.05 INSOMNIA SECONDARY TO ANXIETY: ICD-10-CM

## 2020-09-28 DIAGNOSIS — G47.00 PERSISTENT INSOMNIA: ICD-10-CM

## 2020-09-28 DIAGNOSIS — I10 ESSENTIAL HYPERTENSION: Primary | ICD-10-CM

## 2020-09-28 PROCEDURE — 90471 IMMUNIZATION ADMIN: CPT

## 2020-09-28 PROCEDURE — 1036F TOBACCO NON-USER: CPT | Performed by: FAMILY MEDICINE

## 2020-09-28 PROCEDURE — 90750 HZV VACC RECOMBINANT IM: CPT

## 2020-09-28 PROCEDURE — 3075F SYST BP GE 130 - 139MM HG: CPT | Performed by: FAMILY MEDICINE

## 2020-09-28 PROCEDURE — 3078F DIAST BP <80 MM HG: CPT | Performed by: FAMILY MEDICINE

## 2020-09-28 PROCEDURE — 99214 OFFICE O/P EST MOD 30 MIN: CPT | Performed by: FAMILY MEDICINE

## 2020-09-28 RX ORDER — QUETIAPINE FUMARATE 100 MG/1
100 TABLET, FILM COATED ORAL
Qty: 30 TABLET | Refills: 3 | Status: SHIPPED | OUTPATIENT
Start: 2020-09-28 | End: 2021-01-15

## 2020-09-28 NOTE — PROGRESS NOTES
Assessment/Plan:         Diagnoses and all orders for this visit:    Essential hypertension  -     CBC and differential; Future  -     CBC and differential    Insomnia secondary to anxiety  -     QUEtiapine (SEROquel) 100 mg tablet; Take 1 tablet (100 mg total) by mouth daily at bedtime    Class 1 obesity due to excess calories with serious comorbidity and body mass index (BMI) of 34 0 to 34 9 in adult    Gastroesophageal reflux disease without esophagitis    Mixed hyperlipidemia  Comments:  Excellent lipid profile  Orders:  -     Lipid Panel with Direct LDL reflex; Future  -     Lipid Panel with Direct LDL reflex    Persistent insomnia    Obstructive sleep apnea    Prediabetes  -     Comprehensive metabolic panel; Future  -     HEMOGLOBIN A1C W/ EAG ESTIMATION; Future  -     Comprehensive metabolic panel  -     HEMOGLOBIN A1C W/ EAG ESTIMATION    Encounter for immunization  -     Zoster Vaccine Recombinant IM          Subjective:      Patient ID: Jania Brown is a 56 y o  male  4 month follow up     No recent illness or injury  No COVID concern    Has been working most of the time  Knows he has gained weight, is a good cook    Has been using CPAP and is wondering about Gwendalyn Rinne   I advised return to Sleep specialist for reevaluation having his CPAP over 5 years  Wants to know what can knock him out for sleep that isnt controlled  I suggested try increase Seroquel to 100mg hs    Medications reviewed, all well tolerated    Labs reviewed in detail and copy given  A1c stable on diet  PSA elevating but has a favorable % free PSA  The following portions of the patient's history were reviewed and updated as appropriate: allergies, current medications, past family history, past medical history, past social history, past surgical history and problem list     Review of Systems   Constitutional: Positive for unexpected weight change  Negative for fatigue  HENT: Negative  Respiratory: Negative  Cardiovascular: Negative  Gastrointestinal: Negative  Genitourinary: Negative  Musculoskeletal: Negative  Neurological: Negative  Psychiatric/Behavioral: Negative  All other systems reviewed and are negative  Objective:      /70   Pulse 67   Temp 97 8 °F (36 6 °C)   Ht 5' 10" (1 778 m)   Wt 108 kg (238 lb)   BMI 34 15 kg/m²          Physical Exam  Vitals signs and nursing note reviewed  Constitutional:       Appearance: Normal appearance  Cardiovascular:      Rate and Rhythm: Normal rate and regular rhythm  Pulses: no weak pulses          Dorsalis pedis pulses are 2+ on the right side and 2+ on the left side  Posterior tibial pulses are 2+ on the right side and 2+ on the left side  Pulmonary:      Effort: Pulmonary effort is normal       Breath sounds: Normal breath sounds  Feet:      Right foot:      Skin integrity: No ulcer, skin breakdown, erythema, warmth, callus or dry skin  Left foot:      Skin integrity: No ulcer, skin breakdown, erythema, warmth, callus or dry skin  Neurological:      General: No focal deficit present  Mental Status: He is alert and oriented to person, place, and time  Psychiatric:         Mood and Affect: Mood normal          Behavior: Behavior normal          Thought Content: Thought content normal          Judgment: Judgment normal          Patient's shoes and socks removed  Right Foot/Ankle   Right Foot Inspection  Skin Exam: skin normal and skin intact no dry skin, no warmth, no callus, no erythema, no maceration, no abnormal color, no pre-ulcer, no ulcer and no callus                          Toe Exam: ROM and strength within normal limits  Sensory   Vibration: intact  Proprioception: intact   Monofilament testing: intact  Vascular  Capillary refills: < 3 seconds  The right DP pulse is 2+  The right PT pulse is 2+       Left Foot/Ankle  Left Foot Inspection  Skin Exam: skin normal and skin intactno dry skin, no warmth, no erythema, no maceration, normal color, no pre-ulcer, no ulcer and no callus                         Toe Exam: ROM and strength within normal limits                   Sensory   Vibration: intact  Proprioception: intact  Monofilament: intact  Vascular  Capillary refills: < 3 seconds  The left DP pulse is 2+  The left PT pulse is 2+  Assign Risk Category:  No deformity present; No loss of protective sensation;  No weak pulses       Risk: 0

## 2020-10-02 DIAGNOSIS — F41.9 INSOMNIA SECONDARY TO ANXIETY: ICD-10-CM

## 2020-10-02 DIAGNOSIS — F51.05 INSOMNIA SECONDARY TO ANXIETY: ICD-10-CM

## 2020-10-02 DIAGNOSIS — I10 ESSENTIAL HYPERTENSION: ICD-10-CM

## 2020-10-02 RX ORDER — AMLODIPINE BESYLATE AND BENAZEPRIL HYDROCHLORIDE 10; 40 MG/1; MG/1
CAPSULE ORAL
Qty: 30 CAPSULE | Refills: 5 | Status: SHIPPED | OUTPATIENT
Start: 2020-10-02 | End: 2021-04-12

## 2020-10-02 RX ORDER — QUETIAPINE FUMARATE 25 MG/1
TABLET, FILM COATED ORAL
Qty: 30 TABLET | Refills: 3 | Status: SHIPPED | OUTPATIENT
Start: 2020-10-02 | End: 2020-12-11 | Stop reason: DRUGHIGH

## 2020-10-27 DIAGNOSIS — M17.11 PRIMARY OSTEOARTHRITIS OF RIGHT KNEE: Primary | ICD-10-CM

## 2020-10-27 NOTE — TELEPHONE ENCOUNTER
Patient called in stating that his knee is "starting to bother him again" and he would like to restart the process for the [facility] injection for the tight knee          [phone]

## 2020-10-29 DIAGNOSIS — E78.2 MIXED HYPERLIPIDEMIA: ICD-10-CM

## 2020-10-29 RX ORDER — SIMVASTATIN 40 MG
TABLET ORAL
Qty: 30 TABLET | Refills: 5 | Status: SHIPPED | OUTPATIENT
Start: 2020-10-29 | End: 2021-05-12

## 2020-11-20 ENCOUNTER — OFFICE VISIT (OUTPATIENT)
Dept: OBGYN CLINIC | Facility: CLINIC | Age: 60
End: 2020-11-20
Payer: COMMERCIAL

## 2020-11-20 VITALS
TEMPERATURE: 97.8 F | HEART RATE: 74 BPM | HEIGHT: 69 IN | DIASTOLIC BLOOD PRESSURE: 82 MMHG | SYSTOLIC BLOOD PRESSURE: 124 MMHG | BODY MASS INDEX: 35.25 KG/M2 | WEIGHT: 238 LBS

## 2020-11-20 DIAGNOSIS — M17.11 PRIMARY OSTEOARTHRITIS OF RIGHT KNEE: Primary | ICD-10-CM

## 2020-11-20 PROCEDURE — 20610 DRAIN/INJ JOINT/BURSA W/O US: CPT | Performed by: ORTHOPAEDIC SURGERY

## 2020-11-20 RX ORDER — LIDOCAINE HYDROCHLORIDE 10 MG/ML
3 INJECTION, SOLUTION EPIDURAL; INFILTRATION; INTRACAUDAL; PERINEURAL
Status: COMPLETED | OUTPATIENT
Start: 2020-11-20 | End: 2020-11-20

## 2020-11-20 RX ADMIN — LIDOCAINE HYDROCHLORIDE 3 ML: 10 INJECTION, SOLUTION EPIDURAL; INFILTRATION; INTRACAUDAL; PERINEURAL at 20:12

## 2020-11-24 ENCOUNTER — TELEPHONE (OUTPATIENT)
Dept: FAMILY MEDICINE CLINIC | Facility: HOSPITAL | Age: 60
End: 2020-11-24
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
## 2020-11-24 RX ORDER — DOXYCYCLINE HYCLATE 100 MG
100 TABLET ORAL 2 TIMES DAILY
Qty: 28 TABLET | Refills: 0 | Status: SHIPPED | OUTPATIENT
Start: 2020-11-24 | End: 2020-12-08

## 2020-11-28 ENCOUNTER — HOSPITAL ENCOUNTER (EMERGENCY)
Facility: HOSPITAL | Age: 60
Discharge: HOME | End: 2020-11-28
Attending: EMERGENCY MEDICINE
Payer: COMMERCIAL

## 2020-11-28 VITALS
HEART RATE: 70 BPM | OXYGEN SATURATION: 99 % | WEIGHT: 238 LBS | SYSTOLIC BLOOD PRESSURE: 152 MMHG | DIASTOLIC BLOOD PRESSURE: 70 MMHG | TEMPERATURE: 97.1 F | BODY MASS INDEX: 35.15 KG/M2 | RESPIRATION RATE: 20 BRPM

## 2020-11-28 DIAGNOSIS — S20.369A TICK BITE OF CHEST WALL, INITIAL ENCOUNTER: Primary | ICD-10-CM
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
## 2020-11-28 PROCEDURE — 99282 EMERGENCY DEPT VISIT SF MDM: CPT

## 2020-11-28 PROCEDURE — 99282 EMERGENCY DEPT VISIT SF MDM: CPT | Performed by: EMERGENCY MEDICINE

## 2020-12-03 ENCOUNTER — VBI (OUTPATIENT)
Dept: FAMILY MEDICINE CLINIC | Facility: HOSPITAL | Age: 60
End: 2020-12-03

## 2020-12-07 ENCOUNTER — PATIENT OUTREACH (OUTPATIENT)
Dept: CASE MANAGEMENT | Facility: HOSPITAL | Age: 60
End: 2020-12-07

## 2020-12-11 ENCOUNTER — OFFICE VISIT (OUTPATIENT)
Dept: CARDIOLOGY CLINIC | Facility: CLINIC | Age: 60
End: 2020-12-11
Payer: COMMERCIAL

## 2020-12-11 VITALS
WEIGHT: 247 LBS | HEART RATE: 63 BPM | BODY MASS INDEX: 36.58 KG/M2 | HEIGHT: 69 IN | DIASTOLIC BLOOD PRESSURE: 76 MMHG | SYSTOLIC BLOOD PRESSURE: 138 MMHG

## 2020-12-11 DIAGNOSIS — I49.3 SYMPTOMATIC PVCS: Primary | ICD-10-CM

## 2020-12-11 PROCEDURE — 99214 OFFICE O/P EST MOD 30 MIN: CPT | Performed by: INTERNAL MEDICINE

## 2020-12-11 PROCEDURE — 3075F SYST BP GE 130 - 139MM HG: CPT | Performed by: INTERNAL MEDICINE

## 2020-12-11 PROCEDURE — 3078F DIAST BP <80 MM HG: CPT | Performed by: INTERNAL MEDICINE

## 2020-12-11 PROCEDURE — 3008F BODY MASS INDEX DOCD: CPT | Performed by: INTERNAL MEDICINE

## 2020-12-11 PROCEDURE — 93000 ELECTROCARDIOGRAM COMPLETE: CPT | Performed by: INTERNAL MEDICINE

## 2020-12-15 DIAGNOSIS — I10 ESSENTIAL HYPERTENSION: ICD-10-CM

## 2020-12-15 DIAGNOSIS — K21.00 GASTROESOPHAGEAL REFLUX DISEASE WITH ESOPHAGITIS: ICD-10-CM

## 2020-12-15 RX ORDER — OMEPRAZOLE 40 MG/1
CAPSULE, DELAYED RELEASE ORAL
Qty: 30 CAPSULE | Refills: 5 | Status: SHIPPED | OUTPATIENT
Start: 2020-12-15 | End: 2021-06-13

## 2020-12-15 RX ORDER — METOPROLOL SUCCINATE 25 MG/1
TABLET, EXTENDED RELEASE ORAL
Qty: 30 TABLET | Refills: 5 | Status: SHIPPED | OUTPATIENT
Start: 2020-12-15 | End: 2021-06-13

## 2021-01-15 DIAGNOSIS — F51.05 INSOMNIA SECONDARY TO ANXIETY: ICD-10-CM

## 2021-01-15 DIAGNOSIS — F41.9 INSOMNIA SECONDARY TO ANXIETY: ICD-10-CM

## 2021-01-15 RX ORDER — QUETIAPINE FUMARATE 100 MG/1
TABLET, FILM COATED ORAL
Qty: 30 TABLET | Refills: 0 | Status: SHIPPED | OUTPATIENT
Start: 2021-01-15 | End: 2021-02-10

## 2021-01-19 ENCOUNTER — TELEPHONE (OUTPATIENT)
Dept: FAMILY MEDICINE CLINIC | Facility: HOSPITAL | Age: 61
End: 2021-01-19

## 2021-01-22 DIAGNOSIS — Z20.822 EXPOSURE TO COVID-19 VIRUS: Primary | ICD-10-CM

## 2021-01-26 LAB
ALBUMIN SERPL-MCNC: 4.6 G/DL (ref 3.8–4.8)
ALBUMIN/GLOB SERPL: 1.6 {RATIO} (ref 1.2–2.2)
ALP SERPL-CCNC: 76 IU/L (ref 39–117)
ALT SERPL-CCNC: 22 IU/L (ref 0–44)
AST SERPL-CCNC: 20 IU/L (ref 0–40)
BASOPHILS # BLD AUTO: 0.1 X10E3/UL (ref 0–0.2)
BASOPHILS NFR BLD AUTO: 1 %
BILIRUB SERPL-MCNC: 0.6 MG/DL (ref 0–1.2)
BUN SERPL-MCNC: 20 MG/DL (ref 8–27)
BUN/CREAT SERPL: 17 (ref 10–24)
CALCIUM SERPL-MCNC: 9.6 MG/DL (ref 8.6–10.2)
CHLORIDE SERPL-SCNC: 102 MMOL/L (ref 96–106)
CHOLEST SERPL-MCNC: 174 MG/DL (ref 100–199)
CO2 SERPL-SCNC: 23 MMOL/L (ref 20–29)
CREAT SERPL-MCNC: 1.21 MG/DL (ref 0.76–1.27)
EOSINOPHIL # BLD AUTO: 0.3 X10E3/UL (ref 0–0.4)
EOSINOPHIL NFR BLD AUTO: 4 %
ERYTHROCYTE [DISTWIDTH] IN BLOOD BY AUTOMATED COUNT: 13.2 % (ref 11.6–15.4)
EST. AVERAGE GLUCOSE BLD GHB EST-MCNC: 140 MG/DL
GLOBULIN SER-MCNC: 2.9 G/DL (ref 1.5–4.5)
GLUCOSE SERPL-MCNC: 134 MG/DL (ref 65–99)
HBA1C MFR BLD: 6.5 % (ref 4.8–5.6)
HCT VFR BLD AUTO: 41.1 % (ref 37.5–51)
HDLC SERPL-MCNC: 42 MG/DL
HGB BLD-MCNC: 14.1 G/DL (ref 13–17.7)
IMM GRANULOCYTES # BLD: 0.1 X10E3/UL (ref 0–0.1)
IMM GRANULOCYTES NFR BLD: 1 %
LDLC SERPL CALC-MCNC: 85 MG/DL (ref 0–99)
LDLC/HDLC SERPL: 2 RATIO (ref 0–3.6)
LYMPHOCYTES # BLD AUTO: 1.9 X10E3/UL (ref 0.7–3.1)
LYMPHOCYTES NFR BLD AUTO: 26 %
MCH RBC QN AUTO: 31.2 PG (ref 26.6–33)
MCHC RBC AUTO-ENTMCNC: 34.3 G/DL (ref 31.5–35.7)
MCV RBC AUTO: 91 FL (ref 79–97)
MONOCYTES # BLD AUTO: 0.7 X10E3/UL (ref 0.1–0.9)
MONOCYTES NFR BLD AUTO: 9 %
NEUTROPHILS # BLD AUTO: 4.5 X10E3/UL (ref 1.4–7)
NEUTROPHILS NFR BLD AUTO: 59 %
PLATELET # BLD AUTO: 228 X10E3/UL (ref 150–450)
POTASSIUM SERPL-SCNC: 4.4 MMOL/L (ref 3.5–5.2)
PROT SERPL-MCNC: 7.5 G/DL (ref 6–8.5)
RBC # BLD AUTO: 4.52 X10E6/UL (ref 4.14–5.8)
SARS-COV-2 IGG SERPL QL IA: NEGATIVE
SL AMB EGFR AFRICAN AMERICAN: 74 ML/MIN/1.73
SL AMB EGFR NON AFRICAN AMERICAN: 64 ML/MIN/1.73
SL AMB VLDL CHOLESTEROL CALC: 47 MG/DL (ref 5–40)
SODIUM SERPL-SCNC: 139 MMOL/L (ref 134–144)
TRIGL SERPL-MCNC: 283 MG/DL (ref 0–149)
WBC # BLD AUTO: 7.6 X10E3/UL (ref 3.4–10.8)

## 2021-01-26 PROCEDURE — 3044F HG A1C LEVEL LT 7.0%: CPT | Performed by: FAMILY MEDICINE

## 2021-01-29 ENCOUNTER — OFFICE VISIT (OUTPATIENT)
Dept: FAMILY MEDICINE CLINIC | Facility: HOSPITAL | Age: 61
End: 2021-01-29
Payer: COMMERCIAL

## 2021-01-29 VITALS
WEIGHT: 248 LBS | SYSTOLIC BLOOD PRESSURE: 120 MMHG | BODY MASS INDEX: 36.73 KG/M2 | DIASTOLIC BLOOD PRESSURE: 64 MMHG | HEIGHT: 69 IN | TEMPERATURE: 96.6 F | HEART RATE: 74 BPM

## 2021-01-29 DIAGNOSIS — E11.9 DM TYPE 2 WITHOUT RETINOPATHY (HCC): ICD-10-CM

## 2021-01-29 DIAGNOSIS — E78.2 MIXED HYPERLIPIDEMIA: ICD-10-CM

## 2021-01-29 DIAGNOSIS — Z23 ENCOUNTER FOR IMMUNIZATION: ICD-10-CM

## 2021-01-29 DIAGNOSIS — I10 ESSENTIAL HYPERTENSION: Primary | ICD-10-CM

## 2021-01-29 DIAGNOSIS — R73.03 PREDIABETES: ICD-10-CM

## 2021-01-29 DIAGNOSIS — K21.9 GASTROESOPHAGEAL REFLUX DISEASE WITHOUT ESOPHAGITIS: ICD-10-CM

## 2021-01-29 PROCEDURE — 90750 HZV VACC RECOMBINANT IM: CPT

## 2021-01-29 PROCEDURE — 3008F BODY MASS INDEX DOCD: CPT | Performed by: FAMILY MEDICINE

## 2021-01-29 PROCEDURE — 3074F SYST BP LT 130 MM HG: CPT | Performed by: FAMILY MEDICINE

## 2021-01-29 PROCEDURE — 1036F TOBACCO NON-USER: CPT | Performed by: FAMILY MEDICINE

## 2021-01-29 PROCEDURE — 3725F SCREEN DEPRESSION PERFORMED: CPT | Performed by: FAMILY MEDICINE

## 2021-01-29 PROCEDURE — 3078F DIAST BP <80 MM HG: CPT | Performed by: FAMILY MEDICINE

## 2021-01-29 PROCEDURE — 99214 OFFICE O/P EST MOD 30 MIN: CPT | Performed by: FAMILY MEDICINE

## 2021-01-29 PROCEDURE — 90471 IMMUNIZATION ADMIN: CPT

## 2021-01-29 RX ORDER — BLOOD SUGAR DIAGNOSTIC
STRIP MISCELLANEOUS
Qty: 50 EACH | Refills: 3 | Status: SHIPPED | OUTPATIENT
Start: 2021-01-29 | End: 2021-12-17

## 2021-01-29 RX ORDER — BLOOD SUGAR DIAGNOSTIC
1 STRIP MISCELLANEOUS 2 TIMES WEEKLY
Qty: 100 EACH | Refills: 3 | Status: SHIPPED | OUTPATIENT
Start: 2021-02-01 | End: 2021-12-17

## 2021-01-29 NOTE — PROGRESS NOTES
Assessment/Plan:         Diagnoses and all orders for this visit:    Essential hypertension    Gastroesophageal reflux disease without esophagitis  Comments:  Stable on daily Omeprazole  BMI 36 0-36 9,adult    Prediabetes  -     Comprehensive metabolic panel; Future  -     HEMOGLOBIN A1C W/ EAG ESTIMATION; Future  -     Comprehensive metabolic panel  -     HEMOGLOBIN A1C W/ EAG ESTIMATION    Mixed hyperlipidemia  -     Lipid Panel with Direct LDL reflex; Future  -     Lipid Panel with Direct LDL reflex    Encounter for immunization  -     Zoster Vaccine Recombinant IM    DM type 2 without retinopathy (HCC)  -     glucose blood (Contour Next Test) test strip; Use once a day to test glucose          Subjective:      Patient ID: Rafael Stearns is a 56 y o  male  4 month follow up  No recent illness or injury  No COVID concerns  Wants to be on COVID vaccine list    Again frustrated with inability to lose substantial weight  Medication list reviewed, all well tolerated  Labs reviewed and copy given to patient  Addressed elevated A1c to 6 5  He will work harder on diet and weight loss  The following portions of the patient's history were reviewed and updated as appropriate: allergies, current medications, past family history, past medical history, past social history, past surgical history and problem list     Review of Systems   Constitutional: Negative for unexpected weight change  HENT: Negative  Respiratory: Negative  Cardiovascular: Negative  Gastrointestinal: Negative  Genitourinary: Negative  Musculoskeletal: Negative  Neurological: Negative  Hematological: Negative  Psychiatric/Behavioral: Negative  All other systems reviewed and are negative  Objective:      /64   Pulse 74   Temp (!) 96 6 °F (35 9 °C)   Ht 5' 9" (1 753 m)   Wt 112 kg (248 lb)   BMI 36 62 kg/m²          Physical Exam  Vitals signs and nursing note reviewed     Constitutional: Appearance: Normal appearance  Cardiovascular:      Rate and Rhythm: Normal rate and regular rhythm  Pulses: Normal pulses  Heart sounds: Normal heart sounds  Pulmonary:      Effort: Pulmonary effort is normal       Breath sounds: Normal breath sounds  Neurological:      General: No focal deficit present  Mental Status: He is alert and oriented to person, place, and time  Psychiatric:         Mood and Affect: Mood normal          Behavior: Behavior normal          Thought Content: Thought content normal          Judgment: Judgment normal        BMI Counseling: Body mass index is 36 62 kg/m²  The BMI is above normal  Nutrition recommendations include reducing portion sizes, decreasing overall calorie intake and moderation in carbohydrate intake  Exercise recommendations include exercising 3-5 times per week

## 2021-02-05 DIAGNOSIS — E11.9 DM TYPE 2 WITHOUT RETINOPATHY (HCC): Primary | ICD-10-CM

## 2021-02-05 RX ORDER — BLOOD SUGAR DIAGNOSTIC
1 STRIP MISCELLANEOUS 2 TIMES DAILY
Qty: 100 EACH | Refills: 2 | Status: SHIPPED | OUTPATIENT
Start: 2021-02-05 | End: 2022-02-12

## 2021-02-05 RX ORDER — BLOOD-GLUCOSE METER
EACH MISCELLANEOUS 2 TIMES DAILY
Qty: 1 KIT | Refills: 0 | Status: SHIPPED | OUTPATIENT
Start: 2021-02-05

## 2021-02-09 DIAGNOSIS — F51.05 INSOMNIA SECONDARY TO ANXIETY: ICD-10-CM

## 2021-02-09 DIAGNOSIS — F41.9 INSOMNIA SECONDARY TO ANXIETY: ICD-10-CM

## 2021-02-10 RX ORDER — QUETIAPINE FUMARATE 100 MG/1
TABLET, FILM COATED ORAL
Qty: 30 TABLET | Refills: 0 | Status: SHIPPED | OUTPATIENT
Start: 2021-02-10 | End: 2021-03-12

## 2021-03-10 DIAGNOSIS — Z23 ENCOUNTER FOR IMMUNIZATION: ICD-10-CM

## 2021-03-12 DIAGNOSIS — F51.05 INSOMNIA SECONDARY TO ANXIETY: ICD-10-CM

## 2021-03-12 DIAGNOSIS — F41.9 INSOMNIA SECONDARY TO ANXIETY: ICD-10-CM

## 2021-03-12 RX ORDER — QUETIAPINE FUMARATE 100 MG/1
TABLET, FILM COATED ORAL
Qty: 30 TABLET | Refills: 5 | Status: SHIPPED | OUTPATIENT
Start: 2021-03-12 | End: 2021-05-28 | Stop reason: SDUPTHER

## 2021-04-12 DIAGNOSIS — I10 ESSENTIAL HYPERTENSION: ICD-10-CM

## 2021-04-12 RX ORDER — AMLODIPINE BESYLATE AND BENAZEPRIL HYDROCHLORIDE 10; 40 MG/1; MG/1
CAPSULE ORAL
Qty: 30 CAPSULE | Refills: 5 | Status: SHIPPED | OUTPATIENT
Start: 2021-04-12 | End: 2021-10-08

## 2021-04-30 LAB
LEFT EYE DIABETIC RETINOPATHY: NORMAL
RIGHT EYE DIABETIC RETINOPATHY: NORMAL

## 2021-05-12 DIAGNOSIS — E78.2 MIXED HYPERLIPIDEMIA: ICD-10-CM

## 2021-05-12 RX ORDER — SIMVASTATIN 40 MG
TABLET ORAL
Qty: 30 TABLET | Refills: 5 | Status: SHIPPED | OUTPATIENT
Start: 2021-05-12 | End: 2021-11-07

## 2021-05-24 LAB
ALBUMIN SERPL-MCNC: 4.6 G/DL (ref 3.8–4.8)
ALBUMIN/GLOB SERPL: 1.7 {RATIO} (ref 1.2–2.2)
ALP SERPL-CCNC: 71 IU/L (ref 48–121)
ALT SERPL-CCNC: 16 IU/L (ref 0–44)
AST SERPL-CCNC: 16 IU/L (ref 0–40)
BILIRUB SERPL-MCNC: 0.5 MG/DL (ref 0–1.2)
BUN SERPL-MCNC: 22 MG/DL (ref 8–27)
BUN/CREAT SERPL: 16 (ref 10–24)
CALCIUM SERPL-MCNC: 9.7 MG/DL (ref 8.6–10.2)
CHLORIDE SERPL-SCNC: 103 MMOL/L (ref 96–106)
CHOLEST SERPL-MCNC: 195 MG/DL (ref 100–199)
CO2 SERPL-SCNC: 22 MMOL/L (ref 20–29)
CREAT SERPL-MCNC: 1.35 MG/DL (ref 0.76–1.27)
EST. AVERAGE GLUCOSE BLD GHB EST-MCNC: 143 MG/DL
GLOBULIN SER-MCNC: 2.7 G/DL (ref 1.5–4.5)
GLUCOSE SERPL-MCNC: 145 MG/DL (ref 65–99)
HBA1C MFR BLD: 6.6 % (ref 4.8–5.6)
HDLC SERPL-MCNC: 38 MG/DL
LDLC SERPL CALC-MCNC: 109 MG/DL (ref 0–99)
LDLC/HDLC SERPL: 2.9 RATIO (ref 0–3.6)
POTASSIUM SERPL-SCNC: 4.6 MMOL/L (ref 3.5–5.2)
PROT SERPL-MCNC: 7.3 G/DL (ref 6–8.5)
SL AMB EGFR AFRICAN AMERICAN: 64 ML/MIN/1.73
SL AMB EGFR NON AFRICAN AMERICAN: 55 ML/MIN/1.73
SL AMB VLDL CHOLESTEROL CALC: 48 MG/DL (ref 5–40)
SODIUM SERPL-SCNC: 139 MMOL/L (ref 134–144)
TRIGL SERPL-MCNC: 279 MG/DL (ref 0–149)

## 2021-05-24 PROCEDURE — 3044F HG A1C LEVEL LT 7.0%: CPT | Performed by: FAMILY MEDICINE

## 2021-05-28 ENCOUNTER — OFFICE VISIT (OUTPATIENT)
Dept: FAMILY MEDICINE CLINIC | Facility: HOSPITAL | Age: 61
End: 2021-05-28
Payer: COMMERCIAL

## 2021-05-28 VITALS
WEIGHT: 244 LBS | DIASTOLIC BLOOD PRESSURE: 72 MMHG | HEIGHT: 69 IN | TEMPERATURE: 98.1 F | HEART RATE: 62 BPM | SYSTOLIC BLOOD PRESSURE: 120 MMHG | BODY MASS INDEX: 36.14 KG/M2

## 2021-05-28 DIAGNOSIS — Z12.5 SCREENING FOR MALIGNANT NEOPLASM OF PROSTATE: ICD-10-CM

## 2021-05-28 DIAGNOSIS — I10 ESSENTIAL HYPERTENSION: Primary | ICD-10-CM

## 2021-05-28 DIAGNOSIS — E78.2 MIXED HYPERLIPIDEMIA: ICD-10-CM

## 2021-05-28 DIAGNOSIS — F51.05 INSOMNIA SECONDARY TO ANXIETY: ICD-10-CM

## 2021-05-28 DIAGNOSIS — F41.9 INSOMNIA SECONDARY TO ANXIETY: ICD-10-CM

## 2021-05-28 DIAGNOSIS — G47.33 OBSTRUCTIVE SLEEP APNEA: ICD-10-CM

## 2021-05-28 DIAGNOSIS — R73.03 PREDIABETES: ICD-10-CM

## 2021-05-28 DIAGNOSIS — R41.89 COGNITIVE DECLINE: ICD-10-CM

## 2021-05-28 DIAGNOSIS — K76.0 FATTY LIVER: ICD-10-CM

## 2021-05-28 PROCEDURE — 99214 OFFICE O/P EST MOD 30 MIN: CPT | Performed by: FAMILY MEDICINE

## 2021-05-28 PROCEDURE — 3074F SYST BP LT 130 MM HG: CPT | Performed by: FAMILY MEDICINE

## 2021-05-28 PROCEDURE — 1036F TOBACCO NON-USER: CPT | Performed by: FAMILY MEDICINE

## 2021-05-28 PROCEDURE — 3008F BODY MASS INDEX DOCD: CPT | Performed by: FAMILY MEDICINE

## 2021-05-28 PROCEDURE — 3078F DIAST BP <80 MM HG: CPT | Performed by: FAMILY MEDICINE

## 2021-05-28 RX ORDER — QUETIAPINE FUMARATE 100 MG/1
150 TABLET, FILM COATED ORAL
Qty: 45 TABLET | Refills: 5 | Status: SHIPPED | OUTPATIENT
Start: 2021-05-28 | End: 2021-12-03

## 2021-05-28 NOTE — PROGRESS NOTES
Assessment/Plan:         Diagnoses and all orders for this visit:    Essential hypertension  -     CBC and Platelet; Future  -     CBC and Platelet    Mixed hyperlipidemia  Comments:  Continue diet and Simvastatin  Orders:  -     Lipid Panel with Direct LDL reflex; Future  -     Lipid Panel with Direct LDL reflex    Fatty liver  Comments:  LFT's in normal range    Obstructive sleep apnea    Insomnia secondary to anxiety  -     QUEtiapine (SEROquel) 100 mg tablet; Take 1 5 tablets (150 mg total) by mouth daily at bedtime    Cognitive decline  -     Ambulatory referral to Neurology; Future    Prediabetes  -     Comprehensive metabolic panel; Future  -     HEMOGLOBIN A1C W/ EAG ESTIMATION; Future  -     Comprehensive metabolic panel  -     HEMOGLOBIN A1C W/ EAG ESTIMATION    Screening for malignant neoplasm of prostate  -     PSA Total (Reflex To Free); Future  -     PSA Total (Reflex To Free)          Subjective:      Patient ID: Mary Ann Gant is a 59 y o  male  4 month follow up  Nor recent illness or injury  No COVID concern  He did get COVID vaccine without any problems    Feels his memory is getting worse as time goes on- mainly short term  Cant stay focused on conversation or what he reads  He thinks he had this problem for most of his life but is getting worse and is noticed by friends and family    Frustrated with weight and inability to lose  Not too regular with exercise    Medication list reviewed and revised    Labs reviewed in detail and copy given to him  Good metabolic parameter maintenance for glucose and lipids      The following portions of the patient's history were reviewed and updated as appropriate: allergies, current medications, past family history, past medical history, past social history, past surgical history and problem list     Review of Systems   Constitutional: Negative for unexpected weight change  HENT: Negative  Eyes: Negative for visual disturbance     Respiratory: Negative  Cardiovascular: Negative  Gastrointestinal: Negative  Musculoskeletal: Negative  Neurological: Negative for headaches  Psychiatric/Behavioral: Positive for decreased concentration  Negative for sleep disturbance  All other systems reviewed and are negative  Objective:      /72   Pulse 62   Temp 98 1 °F (36 7 °C)   Ht 5' 9" (1 753 m)   Wt 111 kg (244 lb)   BMI 36 03 kg/m²          Physical Exam  Vitals signs and nursing note reviewed  Constitutional:       Appearance: He is obese  Neck:      Vascular: No carotid bruit  Cardiovascular:      Rate and Rhythm: Normal rate and regular rhythm  Pulses: Normal pulses  Heart sounds: Normal heart sounds  Skin:     Findings: No rash  Neurological:      General: No focal deficit present  Mental Status: He is alert and oriented to person, place, and time  Psychiatric:         Mood and Affect: Mood normal          Behavior: Behavior normal          Thought Content:  Thought content normal          Judgment: Judgment normal

## 2021-06-03 ENCOUNTER — PATIENT OUTREACH (OUTPATIENT)
Dept: CASE MANAGEMENT | Facility: OTHER | Age: 61
End: 2021-06-03

## 2021-06-12 DIAGNOSIS — I10 ESSENTIAL HYPERTENSION: ICD-10-CM

## 2021-06-12 DIAGNOSIS — K21.00 GASTROESOPHAGEAL REFLUX DISEASE WITH ESOPHAGITIS: ICD-10-CM

## 2021-06-13 RX ORDER — OMEPRAZOLE 40 MG/1
CAPSULE, DELAYED RELEASE ORAL
Qty: 30 CAPSULE | Refills: 5 | Status: SHIPPED | OUTPATIENT
Start: 2021-06-13 | End: 2021-12-06

## 2021-06-13 RX ORDER — METOPROLOL SUCCINATE 25 MG/1
TABLET, EXTENDED RELEASE ORAL
Qty: 30 TABLET | Refills: 5 | Status: SHIPPED | OUTPATIENT
Start: 2021-06-13 | End: 2021-12-06

## 2021-09-17 ENCOUNTER — TELEPHONE (OUTPATIENT)
Dept: FAMILY MEDICINE CLINIC | Facility: HOSPITAL | Age: 61
End: 2021-09-17

## 2021-09-17 NOTE — TELEPHONE ENCOUNTER
Patient has a cpap machine that has been recalled  He was told by the supply company that he needed to call his PCP office to find out if he should still be using the machine  The supply company is not able to give him a time frame of when he will be getting a replacement machine   PCB

## 2021-09-17 NOTE — TELEPHONE ENCOUNTER
We really dont have a solution to the problem  Other patients are continuing to use their machines lacie if they really cant sleep without it

## 2021-09-20 ENCOUNTER — TELEPHONE (OUTPATIENT)
Dept: FAMILY MEDICINE CLINIC | Facility: HOSPITAL | Age: 61
End: 2021-09-20

## 2021-09-23 ENCOUNTER — PATIENT OUTREACH (OUTPATIENT)
Dept: CASE MANAGEMENT | Facility: OTHER | Age: 61
End: 2021-09-23

## 2021-09-23 NOTE — PROGRESS NOTES
1st Attempt      [facility] Call:    Would the patient like to make an appointment? no  (Yes or No)    Does the patient have a smart device? yes  (Yes or No)    Is the patient active with MyChart? yes  (Yes or No)    Is the patient interested in completing the SDOH questionnaire? no  (Yes or No)    Is the patient a smoker? No  (Yes or No)    Is the patient interested in smoking cessation counseling? N/A  (Yes, No, or n/a)    Is the patient Diabetic? Yes  (Yes or No)    What is the patient's A1C? 6 6  (equal or greater than 9 - refer to [address] [place] [address])    Who helps the patient manage their medications? self    Does the patient have any issues in obtaining or affording medications? No  (Yes = Referral to [place], No, n/a)    Other barriers identified during the call: No    Summary of Call: Patient states he is doing well  Patient states he continues to take medications as prescribed  Patient states he does not need any assistance with any resources at this time  Patient last pcp appt was on 05/28/21  Patient has a follow up scheduled on 12/17/21  This CHW provided the patient with my contact information  The patient was agreeable to additional outreaches from this CHW  This CHW will follow up in three months

## 2021-09-24 NOTE — TELEPHONE ENCOUNTER
Patient calling asking for a script so he can get a new brand of cpap machine from a different company, says his is old anyway and with this situation wants to just get a new one

## 2021-10-08 DIAGNOSIS — I10 ESSENTIAL HYPERTENSION: ICD-10-CM

## 2021-10-08 RX ORDER — AMLODIPINE BESYLATE AND BENAZEPRIL HYDROCHLORIDE 10; 40 MG/1; MG/1
CAPSULE ORAL
Qty: 30 CAPSULE | Refills: 5 | Status: SHIPPED | OUTPATIENT
Start: 2021-10-08 | End: 2022-03-23

## 2021-11-07 DIAGNOSIS — E78.2 MIXED HYPERLIPIDEMIA: ICD-10-CM

## 2021-11-07 RX ORDER — SIMVASTATIN 40 MG
TABLET ORAL
Qty: 30 TABLET | Refills: 5 | Status: SHIPPED | OUTPATIENT
Start: 2021-11-07 | End: 2022-04-19

## 2021-11-11 ENCOUNTER — TELEPHONE (OUTPATIENT)
Dept: FAMILY MEDICINE CLINIC | Facility: HOSPITAL | Age: 61
End: 2021-11-11

## 2021-11-16 ENCOUNTER — TELEPHONE (OUTPATIENT)
Dept: FAMILY MEDICINE CLINIC | Facility: HOSPITAL | Age: 61
End: 2021-11-16

## 2021-11-19 ENCOUNTER — OFFICE VISIT (OUTPATIENT)
Dept: CARDIOLOGY CLINIC | Facility: CLINIC | Age: 61
End: 2021-11-19
Payer: COMMERCIAL

## 2021-11-19 VITALS
DIASTOLIC BLOOD PRESSURE: 70 MMHG | HEIGHT: 69 IN | BODY MASS INDEX: 36.17 KG/M2 | WEIGHT: 244.2 LBS | SYSTOLIC BLOOD PRESSURE: 128 MMHG | HEART RATE: 67 BPM

## 2021-11-19 DIAGNOSIS — I49.3 SYMPTOMATIC PVCS: ICD-10-CM

## 2021-11-19 DIAGNOSIS — I10 ESSENTIAL HYPERTENSION: Primary | ICD-10-CM

## 2021-11-19 PROCEDURE — 99214 OFFICE O/P EST MOD 30 MIN: CPT | Performed by: INTERNAL MEDICINE

## 2021-11-19 PROCEDURE — 93000 ELECTROCARDIOGRAM COMPLETE: CPT | Performed by: INTERNAL MEDICINE

## 2021-11-19 PROCEDURE — 3078F DIAST BP <80 MM HG: CPT | Performed by: INTERNAL MEDICINE

## 2021-11-19 PROCEDURE — 3008F BODY MASS INDEX DOCD: CPT | Performed by: INTERNAL MEDICINE

## 2021-11-19 PROCEDURE — 3074F SYST BP LT 130 MM HG: CPT | Performed by: INTERNAL MEDICINE

## 2021-11-30 ENCOUNTER — HOSPITAL ENCOUNTER (OUTPATIENT)
Dept: NON INVASIVE DIAGNOSTICS | Age: 61
Discharge: HOME | End: 2021-11-30
Payer: COMMERCIAL

## 2021-11-30 VITALS
HEIGHT: 69 IN | SYSTOLIC BLOOD PRESSURE: 128 MMHG | WEIGHT: 244 LBS | BODY MASS INDEX: 36.14 KG/M2 | DIASTOLIC BLOOD PRESSURE: 68 MMHG | HEART RATE: 68 BPM

## 2021-11-30 DIAGNOSIS — I49.3 SYMPTOMATIC PVCS: ICD-10-CM

## 2021-11-30 DIAGNOSIS — E11.65 TYPE 2 DIABETES MELLITUS WITH HYPERGLYCEMIA, WITHOUT LONG-TERM CURRENT USE OF INSULIN (HCC): ICD-10-CM

## 2021-11-30 DIAGNOSIS — I10 ESSENTIAL HYPERTENSION: ICD-10-CM

## 2021-11-30 LAB
AORTIC ROOT: 3.2 CM
APICAL FOUR CHAMBER EJECTION FRACTION: 52 %
ASCENDING AORTA: 3 CM
DOP CALC LVOT AREA: 3.14 CM2
DOP CALC LVOT DIAMETER: 2 CM
E WAVE DECELERATION TIME: 355 MS
FRACTIONAL SHORTENING: 31 % (ref 28–44)
INTERVENTRICULAR SEPTUM IN DIASTOLE (PARASTERNAL SHORT AXIS VIEW): 0.9 CM
LEFT ATRIUM AREA SYSTOLE SINGLE PLANE A4C: 20.5 CM2
LEFT INTERNAL DIMENSION IN SYSTOLE: 3.5 CM (ref 2.1–4)
LEFT VENTRICULAR INTERNAL DIMENSION IN DIASTOLE: 5.1 CM (ref 8.22–12.25)
LEFT VENTRICULAR POSTERIOR WALL IN END DIASTOLE: 1 CM
LEFT VENTRICULAR STROKE VOLUME: 75 ML
MV E'TISSUE VEL-SEP: 9 CM/S
MV PEAK A VEL: 0.51 M/S
MV PEAK E VEL: 42 CM/S
MV STENOSIS PRESSURE HALF TIME: 0 MS
RIGHT ATRIUM AREA SYSTOLE A4C: 14.9 CM2
RIGHT VENTRICLE ID DIMENSION: 3.9 CM
SL CV LV EF: 55
SL CV PED ECHO LEFT VENTRICLE DIASTOLIC VOLUME (MOD BIPLANE) 2D: 125 ML
SL CV PED ECHO LEFT VENTRICLE SYSTOLIC VOLUME (MOD BIPLANE) 2D: 50 ML
TRICUSPID VALVE S': 0.4 CM/S
Z-SCORE OF LEFT VENTRICULAR DIMENSION IN END SYSTOLE: -6.77

## 2021-11-30 PROCEDURE — 93306 TTE W/DOPPLER COMPLETE: CPT

## 2021-11-30 PROCEDURE — 93306 TTE W/DOPPLER COMPLETE: CPT | Performed by: INTERNAL MEDICINE

## 2021-12-03 DIAGNOSIS — F51.05 INSOMNIA SECONDARY TO ANXIETY: ICD-10-CM

## 2021-12-03 DIAGNOSIS — F41.9 INSOMNIA SECONDARY TO ANXIETY: ICD-10-CM

## 2021-12-03 RX ORDER — QUETIAPINE FUMARATE 100 MG/1
150 TABLET, FILM COATED ORAL
Qty: 45 TABLET | Refills: 5 | Status: SHIPPED | OUTPATIENT
Start: 2021-12-03 | End: 2022-05-08

## 2021-12-05 DIAGNOSIS — K21.00 GASTROESOPHAGEAL REFLUX DISEASE WITH ESOPHAGITIS: ICD-10-CM

## 2021-12-05 DIAGNOSIS — I10 ESSENTIAL HYPERTENSION: ICD-10-CM

## 2021-12-06 RX ORDER — OMEPRAZOLE 40 MG/1
CAPSULE, DELAYED RELEASE ORAL
Qty: 30 CAPSULE | Refills: 5 | Status: SHIPPED | OUTPATIENT
Start: 2021-12-06 | End: 2022-05-07

## 2021-12-06 RX ORDER — METOPROLOL SUCCINATE 25 MG/1
TABLET, EXTENDED RELEASE ORAL
Qty: 30 TABLET | Refills: 5 | Status: SHIPPED | OUTPATIENT
Start: 2021-12-06 | End: 2022-05-07

## 2021-12-07 ENCOUNTER — TELEPHONE (OUTPATIENT)
Dept: CARDIOLOGY CLINIC | Facility: CLINIC | Age: 61
End: 2021-12-07

## 2021-12-09 DIAGNOSIS — E11.65 TYPE 2 DIABETES MELLITUS WITH HYPERGLYCEMIA, WITHOUT LONG-TERM CURRENT USE OF INSULIN (HCC): Primary | ICD-10-CM

## 2021-12-09 RX ORDER — LANCETS 30 GAUGE
EACH MISCELLANEOUS 2 TIMES DAILY
Qty: 200 EACH | Refills: 0 | Status: SHIPPED | OUTPATIENT
Start: 2021-12-09 | End: 2021-12-17 | Stop reason: SDUPTHER

## 2021-12-10 ENCOUNTER — RA CDI HCC (OUTPATIENT)
Dept: OTHER | Facility: HOSPITAL | Age: 61
End: 2021-12-10

## 2021-12-13 LAB
ALBUMIN SERPL-MCNC: 4.7 G/DL (ref 3.8–4.8)
ALBUMIN/GLOB SERPL: 1.9 {RATIO} (ref 1.2–2.2)
ALP SERPL-CCNC: 72 IU/L (ref 44–121)
ALT SERPL-CCNC: 16 IU/L (ref 0–44)
AST SERPL-CCNC: 16 IU/L (ref 0–40)
BILIRUB SERPL-MCNC: 0.4 MG/DL (ref 0–1.2)
BUN SERPL-MCNC: 21 MG/DL (ref 8–27)
BUN/CREAT SERPL: 16 (ref 10–24)
CALCIUM SERPL-MCNC: 9.7 MG/DL (ref 8.6–10.2)
CHLORIDE SERPL-SCNC: 104 MMOL/L (ref 96–106)
CHOLEST SERPL-MCNC: 156 MG/DL (ref 100–199)
CO2 SERPL-SCNC: 24 MMOL/L (ref 20–29)
CREAT SERPL-MCNC: 1.3 MG/DL (ref 0.76–1.27)
ERYTHROCYTE [DISTWIDTH] IN BLOOD BY AUTOMATED COUNT: 12.7 % (ref 11.6–15.4)
EST. AVERAGE GLUCOSE BLD GHB EST-MCNC: 183 MG/DL
GLOBULIN SER-MCNC: 2.5 G/DL (ref 1.5–4.5)
GLUCOSE SERPL-MCNC: 132 MG/DL (ref 65–99)
HBA1C MFR BLD: 8 % (ref 4.8–5.6)
HCT VFR BLD AUTO: 39.1 % (ref 37.5–51)
HDLC SERPL-MCNC: 33 MG/DL
HGB BLD-MCNC: 13.4 G/DL (ref 13–17.7)
LDLC SERPL CALC-MCNC: 86 MG/DL (ref 0–99)
LDLC/HDLC SERPL: 2.6 RATIO (ref 0–3.6)
MCH RBC QN AUTO: 30.5 PG (ref 26.6–33)
MCHC RBC AUTO-ENTMCNC: 34.3 G/DL (ref 31.5–35.7)
MCV RBC AUTO: 89 FL (ref 79–97)
PLATELET # BLD AUTO: 249 X10E3/UL (ref 150–450)
POTASSIUM SERPL-SCNC: 4.6 MMOL/L (ref 3.5–5.2)
PROT SERPL-MCNC: 7.2 G/DL (ref 6–8.5)
PSA SERPL-MCNC: 2.3 NG/ML (ref 0–4)
RBC # BLD AUTO: 4.39 X10E6/UL (ref 4.14–5.8)
SL AMB EGFR AFRICAN AMERICAN: 67 ML/MIN/1.73
SL AMB EGFR NON AFRICAN AMERICAN: 58 ML/MIN/1.73
SL AMB REFLEX CRITERIA: NORMAL
SL AMB VLDL CHOLESTEROL CALC: 37 MG/DL (ref 5–40)
SODIUM SERPL-SCNC: 141 MMOL/L (ref 134–144)
TRIGL SERPL-MCNC: 219 MG/DL (ref 0–149)
WBC # BLD AUTO: 7.9 X10E3/UL (ref 3.4–10.8)

## 2021-12-13 PROCEDURE — 3052F HG A1C>EQUAL 8.0%<EQUAL 9.0%: CPT | Performed by: FAMILY MEDICINE

## 2021-12-17 ENCOUNTER — OFFICE VISIT (OUTPATIENT)
Dept: FAMILY MEDICINE CLINIC | Facility: HOSPITAL | Age: 61
End: 2021-12-17
Payer: COMMERCIAL

## 2021-12-17 VITALS
SYSTOLIC BLOOD PRESSURE: 135 MMHG | DIASTOLIC BLOOD PRESSURE: 70 MMHG | TEMPERATURE: 96.6 F | BODY MASS INDEX: 35.63 KG/M2 | WEIGHT: 240.6 LBS | HEART RATE: 61 BPM | OXYGEN SATURATION: 97 % | HEIGHT: 69 IN

## 2021-12-17 DIAGNOSIS — E78.2 MIXED HYPERLIPIDEMIA: ICD-10-CM

## 2021-12-17 DIAGNOSIS — E11.65 TYPE 2 DIABETES MELLITUS WITH HYPERGLYCEMIA, WITHOUT LONG-TERM CURRENT USE OF INSULIN (HCC): ICD-10-CM

## 2021-12-17 DIAGNOSIS — K76.0 FATTY LIVER: ICD-10-CM

## 2021-12-17 DIAGNOSIS — I10 ESSENTIAL HYPERTENSION: Primary | ICD-10-CM

## 2021-12-17 PROCEDURE — 99214 OFFICE O/P EST MOD 30 MIN: CPT | Performed by: FAMILY MEDICINE

## 2021-12-17 PROCEDURE — 3078F DIAST BP <80 MM HG: CPT | Performed by: FAMILY MEDICINE

## 2021-12-17 PROCEDURE — 3075F SYST BP GE 130 - 139MM HG: CPT | Performed by: FAMILY MEDICINE

## 2021-12-17 PROCEDURE — 1036F TOBACCO NON-USER: CPT | Performed by: FAMILY MEDICINE

## 2021-12-27 ENCOUNTER — TELEPHONE (OUTPATIENT)
Dept: FAMILY MEDICINE CLINIC | Facility: HOSPITAL | Age: 61
End: 2021-12-27

## 2021-12-27 DIAGNOSIS — E11.65 TYPE 2 DIABETES MELLITUS WITH HYPERGLYCEMIA, WITHOUT LONG-TERM CURRENT USE OF INSULIN (HCC): ICD-10-CM

## 2021-12-29 ENCOUNTER — TELEPHONE (OUTPATIENT)
Dept: FAMILY MEDICINE CLINIC | Facility: HOSPITAL | Age: 61
End: 2021-12-29

## 2021-12-29 PROCEDURE — U0005 INFEC AGEN DETEC AMPLI PROBE: HCPCS | Performed by: FAMILY MEDICINE

## 2021-12-29 PROCEDURE — U0003 INFECTIOUS AGENT DETECTION BY NUCLEIC ACID (DNA OR RNA); SEVERE ACUTE RESPIRATORY SYNDROME CORONAVIRUS 2 (SARS-COV-2) (CORONAVIRUS DISEASE [COVID-19]), AMPLIFIED PROBE TECHNIQUE, MAKING USE OF HIGH THROUGHPUT TECHNOLOGIES AS DESCRIBED BY CMS-2020-01-R: HCPCS | Performed by: FAMILY MEDICINE

## 2021-12-29 NOTE — TELEPHONE ENCOUNTER
Diarrhea, vomiting, runny nose, cough, vaccinated and boosted  Started today  No pos exposure that they are aware of  Pt has to take metformin in the morning, and evening, harsh on the stomach  Should he still take?

## 2022-01-19 ENCOUNTER — TELEPHONE (OUTPATIENT)
Dept: FAMILY MEDICINE CLINIC | Facility: HOSPITAL | Age: 62
End: 2022-01-19

## 2022-01-19 NOTE — TELEPHONE ENCOUNTER
Pt states he is going to [facility] for the month of February  He feels his diabetic numbers are still all over the place and running high on his medicine  He would like to be regulated before he leaves  Pt has the meter that records his sugar - would you like to see that? Also, he has been vaccinated and boosted and had covid  He would like to know if there is another shot he should have or be tested for antibodies    Pt can be reached at [phone]

## 2022-01-24 ENCOUNTER — OFFICE VISIT (OUTPATIENT)
Dept: FAMILY MEDICINE CLINIC | Facility: HOSPITAL | Age: 62
End: 2022-01-24
Payer: COMMERCIAL

## 2022-01-24 VITALS
HEIGHT: 69 IN | TEMPERATURE: 97.1 F | OXYGEN SATURATION: 97 % | SYSTOLIC BLOOD PRESSURE: 145 MMHG | BODY MASS INDEX: 34.48 KG/M2 | HEART RATE: 64 BPM | WEIGHT: 232.8 LBS | DIASTOLIC BLOOD PRESSURE: 80 MMHG

## 2022-01-24 DIAGNOSIS — I10 ESSENTIAL HYPERTENSION: ICD-10-CM

## 2022-01-24 DIAGNOSIS — E11.65 TYPE 2 DIABETES MELLITUS WITH HYPERGLYCEMIA, WITHOUT LONG-TERM CURRENT USE OF INSULIN (HCC): Primary | ICD-10-CM

## 2022-01-24 PROCEDURE — 99213 OFFICE O/P EST LOW 20 MIN: CPT | Performed by: FAMILY MEDICINE

## 2022-01-24 PROCEDURE — 3008F BODY MASS INDEX DOCD: CPT | Performed by: FAMILY MEDICINE

## 2022-01-24 NOTE — PROGRESS NOTES
Assessment/Plan:         Diagnoses and all orders for this visit:    Type 2 diabetes mellitus with hyperglycemia, without long-term current use of insulin ([facility])    BMI 34 0-34 9,adult    Essential hypertension          Subjective:      Patient ID: Rashida Puga is a 57 y o  male  Follow up of sugars    Highest in the morning  Snacking on good foods at night      The following portions of the patient's history were reviewed and updated as appropriate: allergies, current medications, past family history, past medical history, past social history, past surgical history and problem list     Review of Systems   Constitutional: Positive for unexpected weight change  Respiratory: Negative  Cardiovascular: Negative  Gastrointestinal: Negative  Musculoskeletal: Negative  Psychiatric/Behavioral: Negative  Objective:      /80 (BP Location: Left arm, Patient Position: Sitting, Cuff Size: Standard)   Pulse 64   Temp (!) 97 1 °F (36 2 °C) (Tympanic)   Ht 5' 9" (1 753 m)   Wt 106 kg (232 lb 12 8 oz)   SpO2 97%   BMI 34 38 kg/m²          Physical Exam  Vitals and nursing note reviewed  Constitutional:       Appearance: Normal appearance  Cardiovascular:      Pulses: Normal pulses  Heart sounds: Normal heart sounds  Pulmonary:      Effort: Pulmonary effort is normal       Breath sounds: Normal breath sounds  Neurological:      General: No focal deficit present  Mental Status: He is alert and oriented to person, place, and time     Psychiatric:         Mood and Affect: Mood normal

## 2022-02-12 DIAGNOSIS — E11.9 DM TYPE 2 WITHOUT RETINOPATHY (HCC): ICD-10-CM

## 2022-02-12 RX ORDER — BLOOD SUGAR DIAGNOSTIC
1 STRIP MISCELLANEOUS 2 TIMES DAILY
Qty: 50 STRIP | Refills: 5 | Status: SHIPPED | OUTPATIENT
Start: 2022-02-12

## 2022-03-23 DIAGNOSIS — I10 ESSENTIAL HYPERTENSION: ICD-10-CM

## 2022-03-23 RX ORDER — AMLODIPINE BESYLATE AND BENAZEPRIL HYDROCHLORIDE 10; 40 MG/1; MG/1
CAPSULE ORAL
Qty: 30 CAPSULE | Refills: 0 | Status: SHIPPED | OUTPATIENT
Start: 2022-03-23 | End: 2022-04-20

## 2022-03-25 LAB
ALBUMIN SERPL-MCNC: 4.5 G/DL (ref 3.8–4.8)
ALBUMIN/GLOB SERPL: 1.8 {RATIO} (ref 1.2–2.2)
ALP SERPL-CCNC: 77 IU/L (ref 44–121)
ALT SERPL-CCNC: 18 IU/L (ref 0–44)
AST SERPL-CCNC: 21 IU/L (ref 0–40)
BILIRUB SERPL-MCNC: 0.4 MG/DL (ref 0–1.2)
BUN SERPL-MCNC: 18 MG/DL (ref 8–27)
BUN/CREAT SERPL: 15 (ref 10–24)
CALCIUM SERPL-MCNC: 9.7 MG/DL (ref 8.6–10.2)
CHLORIDE SERPL-SCNC: 102 MMOL/L (ref 96–106)
CO2 SERPL-SCNC: 22 MMOL/L (ref 20–29)
CREAT SERPL-MCNC: 1.2 MG/DL (ref 0.76–1.27)
EGFR: 68 ML/MIN/1.73
EST. AVERAGE GLUCOSE BLD GHB EST-MCNC: 140 MG/DL
GLOBULIN SER-MCNC: 2.5 G/DL (ref 1.5–4.5)
GLUCOSE SERPL-MCNC: 129 MG/DL (ref 65–99)
HBA1C MFR BLD: 6.5 % (ref 4.8–5.6)
POTASSIUM SERPL-SCNC: 4.5 MMOL/L (ref 3.5–5.2)
PROT SERPL-MCNC: 7 G/DL (ref 6–8.5)
SODIUM SERPL-SCNC: 138 MMOL/L (ref 134–144)

## 2022-04-19 DIAGNOSIS — E78.2 MIXED HYPERLIPIDEMIA: ICD-10-CM

## 2022-04-19 RX ORDER — SIMVASTATIN 40 MG
TABLET ORAL
Qty: 30 TABLET | Refills: 5 | Status: SHIPPED | OUTPATIENT
Start: 2022-04-19

## 2022-04-20 DIAGNOSIS — I10 ESSENTIAL HYPERTENSION: ICD-10-CM

## 2022-04-20 RX ORDER — AMLODIPINE BESYLATE AND BENAZEPRIL HYDROCHLORIDE 10; 40 MG/1; MG/1
CAPSULE ORAL
Qty: 30 CAPSULE | Refills: 0 | Status: SHIPPED | OUTPATIENT
Start: 2022-04-20 | End: 2022-05-18

## 2022-05-07 DIAGNOSIS — F51.05 INSOMNIA SECONDARY TO ANXIETY: ICD-10-CM

## 2022-05-07 DIAGNOSIS — K21.00 GASTROESOPHAGEAL REFLUX DISEASE WITH ESOPHAGITIS: ICD-10-CM

## 2022-05-07 DIAGNOSIS — F41.9 INSOMNIA SECONDARY TO ANXIETY: ICD-10-CM

## 2022-05-07 DIAGNOSIS — I10 ESSENTIAL HYPERTENSION: ICD-10-CM

## 2022-05-07 RX ORDER — OMEPRAZOLE 40 MG/1
CAPSULE, DELAYED RELEASE ORAL
Qty: 30 CAPSULE | Refills: 5 | Status: SHIPPED | OUTPATIENT
Start: 2022-05-07

## 2022-05-07 RX ORDER — METOPROLOL SUCCINATE 25 MG/1
TABLET, EXTENDED RELEASE ORAL
Qty: 30 TABLET | Refills: 5 | Status: SHIPPED | OUTPATIENT
Start: 2022-05-07

## 2022-05-08 RX ORDER — QUETIAPINE FUMARATE 100 MG/1
TABLET, FILM COATED ORAL
Qty: 45 TABLET | Refills: 5 | Status: SHIPPED | OUTPATIENT
Start: 2022-05-08

## 2022-05-11 ENCOUNTER — RA CDI HCC (OUTPATIENT)
Dept: OTHER | Facility: HOSPITAL | Age: 62
End: 2022-05-11

## 2022-05-11 NOTE — TELEPHONE ENCOUNTER
Patient is scheduled with Dr Celia Celis for injections tomorrow 1/3 in [facility]  Deviated nasal septum

## 2022-05-11 NOTE — PROGRESS NOTES
[address]  coding opportunities          Chart Reviewed number of suggestions sent to Provider: 3   e13 [id number]  E11 22  e11 36  Patients Insurance     Medicare Insurance: [employer]

## 2022-05-18 ENCOUNTER — OFFICE VISIT (OUTPATIENT)
Dept: FAMILY MEDICINE CLINIC | Facility: HOSPITAL | Age: 62
End: 2022-05-18
Payer: COMMERCIAL

## 2022-05-18 VITALS
TEMPERATURE: 49.3 F | HEIGHT: 69 IN | BODY MASS INDEX: 34.63 KG/M2 | DIASTOLIC BLOOD PRESSURE: 70 MMHG | WEIGHT: 233.8 LBS | SYSTOLIC BLOOD PRESSURE: 114 MMHG | HEART RATE: 65 BPM

## 2022-05-18 DIAGNOSIS — G47.33 OBSTRUCTIVE SLEEP APNEA: ICD-10-CM

## 2022-05-18 DIAGNOSIS — I10 ESSENTIAL HYPERTENSION: ICD-10-CM

## 2022-05-18 DIAGNOSIS — E78.2 MIXED HYPERLIPIDEMIA: ICD-10-CM

## 2022-05-18 DIAGNOSIS — Z00.00 ANNUAL PHYSICAL EXAM: Primary | ICD-10-CM

## 2022-05-18 DIAGNOSIS — G47.00 PERSISTENT INSOMNIA: ICD-10-CM

## 2022-05-18 DIAGNOSIS — E11.65 TYPE 2 DIABETES MELLITUS WITH HYPERGLYCEMIA, WITHOUT LONG-TERM CURRENT USE OF INSULIN (HCC): ICD-10-CM

## 2022-05-18 PROBLEM — R05.8 PAINFUL COUGH: Status: RESOLVED | Noted: 2019-01-09 | Resolved: 2022-05-18

## 2022-05-18 PROBLEM — R73.03 PREDIABETES: Status: RESOLVED | Noted: 2020-01-24 | Resolved: 2022-05-18

## 2022-05-18 PROBLEM — R52 PAINFUL COUGH: Status: RESOLVED | Noted: 2019-01-09 | Resolved: 2022-05-18

## 2022-05-18 PROCEDURE — 3008F BODY MASS INDEX DOCD: CPT | Performed by: FAMILY MEDICINE

## 2022-05-18 PROCEDURE — 3078F DIAST BP <80 MM HG: CPT | Performed by: FAMILY MEDICINE

## 2022-05-18 PROCEDURE — 99396 PREV VISIT EST AGE 40-64: CPT | Performed by: FAMILY MEDICINE

## 2022-05-18 PROCEDURE — 3074F SYST BP LT 130 MM HG: CPT | Performed by: FAMILY MEDICINE

## 2022-05-18 PROCEDURE — 1036F TOBACCO NON-USER: CPT | Performed by: FAMILY MEDICINE

## 2022-05-18 RX ORDER — AMLODIPINE BESYLATE AND BENAZEPRIL HYDROCHLORIDE 10; 40 MG/1; MG/1
CAPSULE ORAL
Qty: 30 CAPSULE | Refills: 0 | Status: SHIPPED | OUTPATIENT
Start: 2022-05-18 | End: 2022-06-15

## 2022-05-18 NOTE — PATIENT INSTRUCTIONS

## 2022-05-18 NOTE — PROGRESS NOTES
[facility] [address]     NAME: Emma Yin  AGE: 57 y o  SEX: male  : 1960     DATE: 2022     Assessment and Plan:     Problem List Items Addressed This Visit        Endocrine    Type 2 diabetes mellitus with hyperglycemia, without long-term current use of insulin (HCC)    Relevant Orders    Comprehensive metabolic panel    HEMOGLOBIN A1C W/ EAG ESTIMATION       Respiratory    Obstructive sleep apnea       Cardiovascular and Mediastinum    Essential hypertension    Relevant Orders    CBC and Platelet    TSH, 3rd generation with Free T4 reflex       Other    Mixed hyperlipidemia    Relevant Orders    Lipid Panel with Direct LDL reflex    Persistent insomnia    Annual physical exam - Primary          Immunizations and preventive care screenings were discussed with patient today  Appropriate education was printed on patient's after visit summary  Counseling:  Alcohol/drug use: discussed moderation in alcohol intake, the recommendations for healthy alcohol use, and avoidance of illicit drug use  Dental Health: discussed importance of regular tooth brushing, flossing, and dental visits  Injury prevention: discussed safety/seat belts, safety helmets, smoke detectors, carbon dioxide detectors, and smoking near bedding or upholstery  · Exercise: the importance of regular exercise/physical activity was discussed  Recommend exercise 3-5 times per week for at least 30 minutes  BMI Counseling: Body mass index is 35 03 kg/m²  The BMI is above normal  Nutrition recommendations include decreasing portion sizes, limiting drinks that contain sugar, moderation in carbohydrate intake and reducing intake of cholesterol  Exercise recommendations include vigorous physical activity 75 minutes/week  No pharmacotherapy was ordered  Rationale for BMI follow-up plan is due to patient being overweight or obese           Return in about 6 months (around 11/18/2022) for Next scheduled follow up  Chief Complaint:     Chief Complaint   Patient presents with    Annual Exam      History of Present Illness:     Adult Annual Physical   Patient here for a comprehensive physical exam  The patient reports problems - some twitching of chest muscle at times  Not testing own glucoses    Sleeping well with Quetiapine   Diet and Physical Activity  · Diet/Nutrition: diabetic diet  · Exercise: walking  Depression Screening  PHQ-2/9 Depression Screening         General Health  · Sleep: sleeps well  · Hearing: normal - bilateral   · Vision: no vision problems  · Dental: regular dental visits and brushes teeth twice daily   Health  · Symptoms include: none     Review of Systems:     Review of Systems   Constitutional: Negative for unexpected weight change  HENT: Negative  Eyes: Negative for visual disturbance  Respiratory: Negative  Cardiovascular: Negative  Gastrointestinal: Negative  Genitourinary: Negative  Musculoskeletal: Negative  Psychiatric/Behavioral: Negative  All other systems reviewed and are negative       Past Medical History:     Past Medical History:   Diagnosis Date    Atrial premature complex     last assessed: 05/23/2012    Depression 7/9/2012    [facility]     Diverticulitis     GERD (gastroesophageal reflux disease)     Herpes simplex infection     resolved: 07/26/2017    Hyperlipidemia     Hypertension     Nicotine dependence     last assessed: 94/81/6449    Umbilical hernia     last assessed: 07/30/2014      Past Surgical History:     Past Surgical History:   Procedure Laterality Date    BOWEL RESECTION      COLON SIGMOID RESECTION LAPAROSCOPIC      onset: 07/22/2014;  partial-diverticulitis    UMBILICAL HERNIA REPAIR      onset: 07/22/2014      Family History:     Family History   Problem Relation Age of Onset    Depression Mother     Heart attack Father acute    Depression Sister     Depression Sister       Social History:     Social History     Socioeconomic History    Marital status: /Civil Union     Spouse name: None    Number of children: None    Years of education: None    Highest education level: None   Occupational History    Occupation: Full-time employment   Tobacco Use    Smoking status: Former Smoker    Smokeless tobacco: Never Used    Tobacco comment: History of current every day smoker   Vaping Use    Vaping Use: Never used   Substance and Sexual Activity    Alcohol use: Yes     Comment: Social/occasional     Drug use: No    Sexual activity: Yes     Partners: Female   Other Topics Concern    None   Social History Narrative    None     Social Determinants of Health     Financial Resource Strain: Not on file   Food Insecurity: Not on file   Transportation Needs: Not on file   Physical Activity: Not on file   Stress: Not on file   Social Connections: Not on file   Intimate Partner Violence: Not on file   Housing Stability: Not on file      Current Medications:     Current Outpatient Medications   Medication Sig Dispense Refill    amLODIPine-benazepril (LOTREL) 10-40 MG per capsule TAKE 1 CAPSULE BY MOUTH EVERY DAY 30 capsule 0    Blood Glucose Monitoring Suppl (OneTouch Verio) w/Device KIT Use 2 (two) times a day 1 kit 0    metFORMIN (GLUCOPHAGE) 500 mg tablet Take one with breakfast and 2 with evening meal 90 tablet 5    metoprolol succinate (TOPROL-XL) 25 mg 24 hr tablet TAKE 1 TABLET BY MOUTH EVERY DAY 30 tablet 5    omeprazole (PriLOSEC) 40 MG capsule TAKE 1 CAPSULE BY MOUTH EVERY DAY 30 capsule 5    OneTouch Delica Lancets 23M MISC Test self twice a day 200 each 3    OneTouch Verio test strip USE 1 EACH 2 (TWO) TIMES A DAY USE AS INSTRUCTED 50 strip 5    QUEtiapine (SEROquel) 100 mg tablet TAKE 1 AND 1/2 TABLETS (150 MG TOTAL) BY MOUTH DAILY AT BEDTIME 45 tablet 5    simvastatin (ZOCOR) 40 mg tablet TAKE 1 TABLET BY MOUTH EVERY DAY 30 tablet 5     No current facility-administered medications for this visit  Allergies:     No Known Allergies   Physical Exam:     /70   Pulse 65   Temp (!) 49 3 °F (9 6 °C)   Ht 5' 8 5" (1 74 m)   Wt 106 kg (233 lb 12 8 oz)   BMI 35 03 kg/m²     Physical Exam  Vitals and nursing note reviewed  Constitutional:       Appearance: Normal appearance  HENT:      Head: Normocephalic  Right Ear: Tympanic membrane and ear canal normal       Left Ear: Tympanic membrane and ear canal normal       Mouth/Throat:      Mouth: Mucous membranes are moist    Eyes:      Extraocular Movements: Extraocular movements intact  Pupils: Pupils are equal, round, and reactive to light  Neck:      Vascular: No carotid bruit  Cardiovascular:      Rate and Rhythm: Normal rate and regular rhythm  Pulses: Normal pulses  no weak pulses          Dorsalis pedis pulses are 2+ on the right side and 2+ on the left side  Posterior tibial pulses are 2+ on the right side and 2+ on the left side  Heart sounds: Normal heart sounds  Pulmonary:      Effort: Pulmonary effort is normal       Breath sounds: Normal breath sounds  Abdominal:      General: Abdomen is flat  Musculoskeletal:      Right lower leg: No edema  Left lower leg: No edema  Feet:      Right foot:      Skin integrity: No ulcer, skin breakdown, erythema, warmth, callus or dry skin  Left foot:      Skin integrity: No ulcer, skin breakdown, erythema, warmth, callus or dry skin  Skin:     Findings: No rash  Neurological:      Mental Status: He is alert and oriented to person, place, and time  Psychiatric:         Mood and Affect: Mood normal           Gardenia Pastrana MD  [facility] [address]      Patient's shoes and socks removed  Right Foot/Ankle   Right Foot Inspection  Skin Exam: skin normal and skin intact   No dry skin, no warmth, no callus, no erythema, no maceration, no abnormal color, no pre-ulcer, no ulcer and no callus  Toe Exam: ROM and strength within normal limits  Sensory   Vibration: intact  Proprioception: intact  Monofilament testing: intact    Vascular  Capillary refills: < 3 seconds  The right DP pulse is 2+  The right PT pulse is 2+  Left Foot/Ankle  Left Foot Inspection  Skin Exam: skin normal and skin intact  No dry skin, no warmth, no erythema, no maceration, normal color, no pre-ulcer, no ulcer and no callus  Toe Exam: ROM and strength within normal limits  Sensory   Vibration: intact  Proprioception: intact  Monofilament testing: intact    Vascular  Capillary refills: < 3 seconds  The left DP pulse is 2+  The left PT pulse is 2+       Assign Risk Category  No deformity present  No loss of protective sensation  No weak pulses  Risk: 0

## 2022-06-03 ENCOUNTER — TELEPHONE (OUTPATIENT)
Dept: FAMILY MEDICINE CLINIC | Facility: HOSPITAL | Age: 62
End: 2022-06-03

## 2022-06-03 DIAGNOSIS — Z11.52 ENCOUNTER FOR SCREENING FOR COVID-19: Primary | ICD-10-CM

## 2022-06-03 NOTE — TELEPHONE ENCOUNTER
Pt c/o scratchy throat, headache, congestion and sneezing  Symptoms started yesterday, was exposed to someone covid positive on Monday  Asking to be tested   PCB

## 2022-06-15 DIAGNOSIS — I10 ESSENTIAL HYPERTENSION: ICD-10-CM

## 2022-06-15 DIAGNOSIS — E11.65 TYPE 2 DIABETES MELLITUS WITH HYPERGLYCEMIA, WITHOUT LONG-TERM CURRENT USE OF INSULIN (HCC): ICD-10-CM

## 2022-06-15 RX ORDER — AMLODIPINE BESYLATE AND BENAZEPRIL HYDROCHLORIDE 10; 40 MG/1; MG/1
CAPSULE ORAL
Qty: 30 CAPSULE | Refills: 4 | Status: SHIPPED | OUTPATIENT
Start: 2022-06-15

## 2022-06-17 LAB
ALBUMIN SERPL-MCNC: 4.5 G/DL (ref 3.8–4.8)
ALBUMIN/GLOB SERPL: 1.7 {RATIO} (ref 1.2–2.2)
ALP SERPL-CCNC: 76 IU/L (ref 44–121)
ALT SERPL-CCNC: 19 IU/L (ref 0–44)
AST SERPL-CCNC: 17 IU/L (ref 0–40)
BILIRUB SERPL-MCNC: 0.4 MG/DL (ref 0–1.2)
BUN SERPL-MCNC: 26 MG/DL (ref 8–27)
BUN/CREAT SERPL: 21 (ref 10–24)
CALCIUM SERPL-MCNC: 9.8 MG/DL (ref 8.6–10.2)
CHLORIDE SERPL-SCNC: 104 MMOL/L (ref 96–106)
CHOLEST SERPL-MCNC: 158 MG/DL (ref 100–199)
CO2 SERPL-SCNC: 23 MMOL/L (ref 20–29)
CREAT SERPL-MCNC: 1.25 MG/DL (ref 0.76–1.27)
EGFR: 64 ML/MIN/1.73
ERYTHROCYTE [DISTWIDTH] IN BLOOD BY AUTOMATED COUNT: 12.7 % (ref 11.6–15.4)
EST. AVERAGE GLUCOSE BLD GHB EST-MCNC: 140 MG/DL
GLOBULIN SER-MCNC: 2.7 G/DL (ref 1.5–4.5)
GLUCOSE SERPL-MCNC: 128 MG/DL (ref 65–99)
HBA1C MFR BLD: 6.5 % (ref 4.8–5.6)
HCT VFR BLD AUTO: 40 % (ref 37.5–51)
HDLC SERPL-MCNC: 38 MG/DL
HGB BLD-MCNC: 13.4 G/DL (ref 13–17.7)
LDLC SERPL CALC-MCNC: 89 MG/DL (ref 0–99)
LDLC/HDLC SERPL: 2.3 RATIO (ref 0–3.6)
MCH RBC QN AUTO: 29.8 PG (ref 26.6–33)
MCHC RBC AUTO-ENTMCNC: 33.5 G/DL (ref 31.5–35.7)
MCV RBC AUTO: 89 FL (ref 79–97)
PLATELET # BLD AUTO: 257 X10E3/UL (ref 150–450)
POTASSIUM SERPL-SCNC: 4.9 MMOL/L (ref 3.5–5.2)
PROT SERPL-MCNC: 7.2 G/DL (ref 6–8.5)
RBC # BLD AUTO: 4.5 X10E6/UL (ref 4.14–5.8)
SL AMB VLDL CHOLESTEROL CALC: 31 MG/DL (ref 5–40)
SODIUM SERPL-SCNC: 140 MMOL/L (ref 134–144)
TRIGL SERPL-MCNC: 181 MG/DL (ref 0–149)
TSH SERPL DL<=0.005 MIU/L-ACNC: 1.22 UIU/ML (ref 0.45–4.5)
WBC # BLD AUTO: 7.4 X10E3/UL (ref 3.4–10.8)

## 2022-06-17 PROCEDURE — 3044F HG A1C LEVEL LT 7.0%: CPT | Performed by: FAMILY MEDICINE

## 2022-06-21 ENCOUNTER — OFFICE VISIT (OUTPATIENT)
Dept: FAMILY MEDICINE CLINIC | Facility: HOSPITAL | Age: 62
End: 2022-06-21
Payer: COMMERCIAL

## 2022-06-21 VITALS
HEIGHT: 69 IN | HEART RATE: 65 BPM | DIASTOLIC BLOOD PRESSURE: 65 MMHG | WEIGHT: 233.2 LBS | TEMPERATURE: 97.1 F | OXYGEN SATURATION: 98 % | SYSTOLIC BLOOD PRESSURE: 110 MMHG | BODY MASS INDEX: 34.54 KG/M2

## 2022-06-21 DIAGNOSIS — Z23 ENCOUNTER FOR IMMUNIZATION: ICD-10-CM

## 2022-06-21 DIAGNOSIS — K76.0 FATTY LIVER: ICD-10-CM

## 2022-06-21 DIAGNOSIS — I10 ESSENTIAL HYPERTENSION: ICD-10-CM

## 2022-06-21 DIAGNOSIS — K21.9 GASTROESOPHAGEAL REFLUX DISEASE WITHOUT ESOPHAGITIS: ICD-10-CM

## 2022-06-21 DIAGNOSIS — Z12.11 SCREEN FOR COLON CANCER: ICD-10-CM

## 2022-06-21 DIAGNOSIS — E78.2 MIXED HYPERLIPIDEMIA: ICD-10-CM

## 2022-06-21 DIAGNOSIS — E11.65 TYPE 2 DIABETES MELLITUS WITH HYPERGLYCEMIA, WITHOUT LONG-TERM CURRENT USE OF INSULIN (HCC): Primary | ICD-10-CM

## 2022-06-21 PROCEDURE — 90677 PCV20 VACCINE IM: CPT

## 2022-06-21 PROCEDURE — 99214 OFFICE O/P EST MOD 30 MIN: CPT | Performed by: FAMILY MEDICINE

## 2022-06-21 PROCEDURE — 1036F TOBACCO NON-USER: CPT | Performed by: FAMILY MEDICINE

## 2022-06-21 PROCEDURE — 3074F SYST BP LT 130 MM HG: CPT | Performed by: FAMILY MEDICINE

## 2022-06-21 PROCEDURE — 90471 IMMUNIZATION ADMIN: CPT

## 2022-06-21 PROCEDURE — 3078F DIAST BP <80 MM HG: CPT | Performed by: FAMILY MEDICINE

## 2022-06-21 PROCEDURE — 3008F BODY MASS INDEX DOCD: CPT | Performed by: FAMILY MEDICINE

## 2022-06-21 NOTE — PROGRESS NOTES
Assessment/Plan:         Diagnoses and all orders for this visit:    Type 2 diabetes mellitus with hyperglycemia, without long-term current use of insulin (HCC)  Comments:  Excellent A1c stability, optimum range 120-170 meters  Orders:  -     Comprehensive metabolic panel; Future  -     HEMOGLOBIN A1C W/ EAG ESTIMATION; Future  -     Comprehensive metabolic panel  -     HEMOGLOBIN A1C W/ EAG ESTIMATION    Fatty liver  Comments:  Normal LFTs on CMP    Essential hypertension  Comments:  Excellent BP control    Mixed hyperlipidemia  -     Lipid Panel with Direct LDL reflex; Future  -     Lipid Panel with Direct LDL reflex    Screen for colon cancer  -     Ambulatory referral for colonoscopy; Future    Encounter for immunization  -     Pneumococcal Conjugate Vaccine 20-valent (Pcv20)    Gastroesophageal reflux disease without esophagitis          Subjective:      Patient ID: Teddy Ours is a 57 y o  male  6 month follow up    No recent illness or injury    Medications reviewed and list revised    Glucometers under good control- lowest 120 and highest 170  Not testing every day  No hypoglycemia    Has been consistent with his limitations on eating and drinking    Not noticing as many palpitations as before      The following portions of the patient's history were reviewed and updated as appropriate: allergies, current medications, past family history, past medical history, past social history, past surgical history and problem list     Review of Systems   Constitutional: Negative for fatigue and unexpected weight change  HENT: Negative  Eyes: Negative for visual disturbance  Respiratory: Negative  Cardiovascular: Positive for palpitations  Gastrointestinal: Negative  Musculoskeletal: Negative  Psychiatric/Behavioral: Negative  All other systems reviewed and are negative          Objective:      /65 (BP Location: Left arm, Patient Position: Sitting, Cuff Size: Large)   Pulse 65   Temp Kell Newberry ) 97 1 °F (36 2 °C) (Tympanic)   Ht 5' 8 5" (1 74 m)   Wt 106 kg (233 lb 3 2 oz)   SpO2 98%   BMI 34 94 kg/m²          Physical Exam  Vitals and nursing note reviewed  Constitutional:       Appearance: Normal appearance  Cardiovascular:      Rate and Rhythm: Normal rate and regular rhythm  Pulses: Normal pulses  Heart sounds: Normal heart sounds  Pulmonary:      Effort: Pulmonary effort is normal       Breath sounds: Normal breath sounds  Musculoskeletal:      Right lower leg: No edema  Left lower leg: No edema  Skin:     Findings: No rash  Neurological:      General: No focal deficit present  Mental Status: He is alert and oriented to person, place, and time     Psychiatric:         Mood and Affect: Mood normal

## 2022-08-02 NOTE — PROGRESS NOTES
Assessment/Plan:     Diagnoses and all orders for this visit:    Upper respiratory tract infection, unspecified type  Comments:  D/w pt that s/sx within nml duration of URI and that most URI of viral and do not require abx use, advised to con't OTC decongestant and will add Flonase - rx sent to pharmacy,   Rest/fluids/lozenges/hot tea/garles were recommended; d/w pt that cough can last 4-6 wks but call with new/worsening symptoms or if not better in 10 days    Orders:  -     fluticasone (FLONASE) 50 mcg/act nasal spray; 2 sprays into each nostril daily    Chronic pain of right knee  Comments:  D/w pt that knee pain sounds c/w DJD - advised to use Tylenol or if no benefit Ibuprofen at lowest dose/least frequency poss as well as ice and elevation of knee as well as knee sleeve if needed for support, if not better would start with Xray and/or referral to ortho for poss eval for injections, call with new/worse symptoms or if not better          Subjective:      Patient ID: Jania Brown is a 59 y o  male  HPI  Pt here with c/o 3 days of cold symptoms  He states symptoms started with scratchy throat and nasal congestion  Nasal congestion is actually a bit better this am  He notes minimal cough but is sneezing a lot  He notes no ear pain/F but did have a mild chill yesterday  He notes no N/V/D/rashes/urinary symptoms  He has a mild HA  He is taking OTC cold med for HPB  He had a flu vaccine this year  Pt noting R inner knee pain x a few mos  He notes no specific fall/injury/trauma  He notes the pain is sharp and is intermittent  Pain is worse first thing in the am and after walking after sitting for long periods of time  He thinks the area is a bit swollen  He has not tried anything for the pain  Review of Systems   Constitutional: Positive for chills  Negative for fever  HENT: Positive for congestion, rhinorrhea and sore throat  Negative for ear pain, postnasal drip and sinus pain  Problem: Prexisting or High Potential for Compromised Skin Integrity  Goal: Skin integrity is maintained or improved  Description: INTERVENTIONS:  - Identify patients at risk for skin breakdown  - Assess and monitor skin integrity  - Assess and monitor nutrition and hydration status  - Monitor labs   - Assess for incontinence   - Turn and reposition patient  - Assist with mobility/ambulation  - Relieve pressure over bony prominences  - Avoid friction and shearing  - Provide appropriate hygiene as needed including keeping skin clean and dry  - Evaluate need for skin moisturizer/barrier cream  - Collaborate with interdisciplinary team   - Patient/family teaching  - Consider wound care consult   Outcome: Progressing     Problem: MOBILITY - ADULT  Goal: Maintain or return to baseline ADL function  Description: INTERVENTIONS:  -  Assess patient's ability to carry out ADLs; assess patient's baseline for ADL function and identify physical deficits which impact ability to perform ADLs (bathing, care of mouth/teeth, toileting, grooming, dressing, etc )  - Assess/evaluate cause of self-care deficits   - Assess range of motion  - Assess patient's mobility; develop plan if impaired  - Assess patient's need for assistive devices and provide as appropriate  - Encourage maximum independence but intervene and supervise when necessary  - Involve family in performance of ADLs  - Assess for home care needs following discharge   - Consider OT consult to assist with ADL evaluation and planning for discharge  - Provide patient education as appropriate  Outcome: Progressing  Goal: Maintains/Returns to pre admission functional level  Description: INTERVENTIONS:  - Perform BMAT or MOVE assessment daily    - Set and communicate daily mobility goal to care team and patient/family/caregiver  - Collaborate with rehabilitation services on mobility goals if consulted  - Perform Range of Motion 3 times a day    - Reposition patient every 2 Respiratory: Positive for cough  Negative for chest tightness and shortness of breath  Cardiovascular: Negative for chest pain and palpitations  Gastrointestinal: Negative for diarrhea, nausea and vomiting  Genitourinary: Negative for difficulty urinating and dysuria  Musculoskeletal: Positive for arthralgias  Negative for myalgias  Skin: Negative for rash and wound  Neurological: Negative for dizziness and headaches  Hematological: Does not bruise/bleed easily  Psychiatric/Behavioral: Negative for behavioral problems and confusion  Objective:    /80 (Patient Position: Sitting, Cuff Size: Large)   Pulse 90   Temp (!) 96 6 °F (35 9 °C) (Tympanic)   Ht 5' 9 5" (1 765 m)   Wt 113 kg (248 lb 3 2 oz)   SpO2 96%   BMI 36 13 kg/m²      Physical Exam   Constitutional: He appears well-developed and well-nourished  No distress  HENT:   Head: Normocephalic and atraumatic  Right Ear: External ear normal    Left Ear: External ear normal    Mouth/Throat: Oropharynx is clear and moist  No oropharyngeal exudate  Eyes: Conjunctivae are normal  Right eye exhibits no discharge  Left eye exhibits no discharge  Neck: Neck supple  No tracheal deviation present  Cardiovascular: Normal rate and regular rhythm  No murmur heard  Pulmonary/Chest: Effort normal and breath sounds normal  No respiratory distress  He has no wheezes  He has no rales  Musculoskeletal:   amb with limp, mild effusion medial superior R patellar region, neg ant/post drawer and neg valgus/varus stress, no crepitus/popping with PROM   Lymphadenopathy:     He has cervical adenopathy  Neurological: He is alert  He exhibits normal muscle tone  Skin: Skin is warm and dry  No rash noted  Psychiatric: He has a normal mood and affect  His behavior is normal    Nursing note and vitals reviewed  hours   - Dangle patient 3 times a day  - Out of bed for toileting  - Record patient progress and toleration of activity level   Outcome: Progressing     Problem: Nutrition/Hydration-ADULT  Goal: Nutrient/Hydration intake appropriate for improving, restoring or maintaining nutritional needs  Description: Monitor and assess patient's nutrition/hydration status for malnutrition  Collaborate with interdisciplinary team and initiate plan and interventions as ordered  Monitor patient's weight and dietary intake as ordered or per policy  Utilize nutrition screening tool and intervene as necessary  Determine patient's food preferences and provide high-protein, high-caloric foods as appropriate       INTERVENTIONS:  - Monitor oral intake, urinary output, labs, and treatment plans  - Assess nutrition and hydration status and recommend course of action  - Evaluate amount of meals eaten  - Assist patient with eating if necessary   - Allow adequate time for meals  - Recommend/ encourage appropriate diets, oral nutritional supplements, and vitamin/mineral supplements  - Order, calculate, and assess calorie counts as needed  - Assess need for intravenous fluids  - Provide nutrition/hydration education as appropriate  - Include patient/family/caregiver in decisions related to nutrition  Outcome: Progressing     Problem: Potential for Falls  Goal: Patient will remain free of falls  Description: INTERVENTIONS:  - Educate patient/family on patient safety including physical limitations  - Instruct patient to call for assistance with activity   - Consult OT/PT to assist with strengthening/mobility   - Keep Call bell within reach  - Keep bed low and locked with side rails adjusted as appropriate  - Keep care items and personal belongings within reach  - Initiate and maintain comfort rounds  - Make Fall Risk Sign visible to staff  - Offer Toileting every 2 Hours, in advance of need  - Initiate/Maintain bed alarm  - Obtain necessary fall risk management equipment  - Apply yellow socks and bracelet for high fall risk patients  - Consider moving patient to room near nurses station  Outcome: Progressing

## 2022-08-10 ENCOUNTER — OFFICE VISIT (OUTPATIENT)
Dept: FAMILY MEDICINE CLINIC | Facility: HOSPITAL | Age: 62
End: 2022-08-10
Payer: COMMERCIAL

## 2022-08-10 VITALS
OXYGEN SATURATION: 96 % | HEIGHT: 69 IN | BODY MASS INDEX: 35.6 KG/M2 | WEIGHT: 240.4 LBS | SYSTOLIC BLOOD PRESSURE: 122 MMHG | DIASTOLIC BLOOD PRESSURE: 70 MMHG | HEART RATE: 70 BPM

## 2022-08-10 DIAGNOSIS — K14.8 TONGUE LESION: Primary | ICD-10-CM

## 2022-08-10 DIAGNOSIS — Z87.891 PERSONAL HISTORY OF NICOTINE DEPENDENCE: ICD-10-CM

## 2022-08-10 PROCEDURE — 3078F DIAST BP <80 MM HG: CPT | Performed by: NURSE PRACTITIONER

## 2022-08-10 PROCEDURE — 3074F SYST BP LT 130 MM HG: CPT | Performed by: NURSE PRACTITIONER

## 2022-08-10 PROCEDURE — 99213 OFFICE O/P EST LOW 20 MIN: CPT | Performed by: NURSE PRACTITIONER

## 2022-08-10 NOTE — PROGRESS NOTES
[facility]    Assessment/Plan:      Diagnosis ICD-10-CM Associated Orders   1  Tongue lesion  K14 8 Ambulatory Referral to Otolaryngology   2  Personal history of nicotine dependence  [id number]       ENT referral to ensure non-pathologic nature  No follow-ups on file   Patient may call or return to office with any questions or concerns  ______________________________________________________________________  Subjective:     Patient ID: Bj Hayes is a 57 y o  male  Sean Jones is here for evaluation of tongue lesions  He reports at least two, nonpainful, blister like lesions on the back on his tongue that have been increasing in size  Onset 2 weeks ago  Denies fever/chills/malaise  He denies facial swelling, lymph node enlargement  He has no change in appetite nor trouble swallowing  He did note what he describes as a cold sore several weeks back in the posterior buccal mucosa that was tender but has since resolved  Reports being a heavy former smoker, quit 6yrs ago  Last dental visit 3 months ago without complication        Bj Hayes  Chief Complaint   Patient presents with    Mouth Lesions     PT notice a few weeks ago - blisters like the size of a BB at the back of his tongue  Has no pain with them just annoying   The following portions of the patient's history were reviewed and updated as appropriate: allergies, current medications, past family history, past medical history, past social history, past surgical history and problem list     Review of Systems   Constitutional: Negative  Negative for activity change, appetite change, chills, fatigue, fever and unexpected weight change  HENT: Positive for dental problem and mouth sores  Negative for congestion, ear pain, facial swelling, postnasal drip, sinus pressure, sinus pain, sore throat, trouble swallowing and voice change  Hx dental caries   No infectious process noted Eyes: Negative  Respiratory: Negative  Negative for cough, chest tightness and shortness of breath  Cardiovascular: Negative  Negative for chest pain, palpitations and leg swelling  Gastrointestinal: Negative  Negative for constipation and diarrhea  Endocrine: Negative  Genitourinary: Negative  Negative for dysuria  Musculoskeletal: Negative  Skin: Negative  Allergic/Immunologic: Negative  Negative for immunocompromised state  Neurological: Negative  Negative for dizziness and light-headedness  Hematological: Negative  Psychiatric/Behavioral: Negative  Objective:      Vitals:    08/10/22 0823   BP: 122/70   Pulse: 70   SpO2: 96%      Physical Exam  Vitals and nursing note reviewed  Constitutional:       Appearance: Normal appearance  HENT:      Head: Normocephalic and atraumatic  Right Ear: Tympanic membrane, ear canal and external ear normal       Left Ear: Tympanic membrane, ear canal and external ear normal       Nose: Nose normal       Mouth/Throat:      Lips: No lesions  Mouth: Mucous membranes are moist  No oral lesions  Dentition: No dental tenderness or dental abscesses  Tongue: Lesions present  Pharynx: Oropharynx is clear  No pharyngeal swelling  Tonsils: No tonsillar exudate  Eyes:      Extraocular Movements: Extraocular movements intact  Conjunctiva/sclera: Conjunctivae normal       Pupils: Pupils are equal, round, and reactive to light  Cardiovascular:      Rate and Rhythm: Normal rate and regular rhythm  Pulses: Normal pulses  Heart sounds: Normal heart sounds  Comments: S1S2 +ectopy  Pulmonary:      Effort: Pulmonary effort is normal       Breath sounds: Normal breath sounds  Abdominal:      General: Bowel sounds are normal       Palpations: Abdomen is soft  Musculoskeletal:         General: Normal range of motion  Cervical back: Normal range of motion and neck supple        Right lower leg: No edema  Left lower leg: No edema  Skin:     General: Skin is warm and dry  Neurological:      General: No focal deficit present  Mental Status: He is alert and oriented to person, place, and time  Psychiatric:         Mood and Affect: Mood normal          Behavior: Behavior normal          Thought Content: Thought content normal          Judgment: Judgment normal            Portions of the record may have been created with voice recognition software  Occasional wrong word or "sound alike" substitutions may have occurred due to the inherent limitations of voice recognition software  Please review the chart carefully and recognize, using context, where substitutions/typographical errors may have occurred

## 2022-08-23 ENCOUNTER — TELEPHONE (OUTPATIENT)
Dept: GASTROENTEROLOGY | Facility: CLINIC | Age: 62
End: 2022-08-23

## 2022-08-23 NOTE — TELEPHONE ENCOUNTER
I spoke with patient and offered to move his office visit appt up to address patient symptoms and determine if EGD can be added to schedule for combo  Patient agreeable, appt moved to 9/1/22

## 2022-08-23 NOTE — TELEPHONE ENCOUNTER
Patients GI provider:  Dr Catalan November    Number to return call: [phone]    Reason for call: Pt calling to schedule 5 year recall colonoscopy, he is having reflux issues and wonders if he should have EGD also  Scheduled OV 9/29/22 if needed    Please return his call to discuss    Scheduled procedure/appointment date if applicable: Apt/procedure 9/7/2017

## 2022-09-01 ENCOUNTER — TELEPHONE (OUTPATIENT)
Dept: GASTROENTEROLOGY | Facility: CLINIC | Age: 62
End: 2022-09-01

## 2022-09-01 ENCOUNTER — OFFICE VISIT (OUTPATIENT)
Dept: GASTROENTEROLOGY | Facility: CLINIC | Age: 62
End: 2022-09-01
Payer: COMMERCIAL

## 2022-09-01 VITALS
DIASTOLIC BLOOD PRESSURE: 78 MMHG | HEART RATE: 68 BPM | HEIGHT: 69 IN | BODY MASS INDEX: 35.4 KG/M2 | SYSTOLIC BLOOD PRESSURE: 118 MMHG | WEIGHT: 239 LBS

## 2022-09-01 DIAGNOSIS — Z12.11 SCREENING FOR COLON CANCER: ICD-10-CM

## 2022-09-01 DIAGNOSIS — K21.9 GASTROESOPHAGEAL REFLUX DISEASE, UNSPECIFIED WHETHER ESOPHAGITIS PRESENT: Primary | ICD-10-CM

## 2022-09-01 PROCEDURE — 3074F SYST BP LT 130 MM HG: CPT | Performed by: NURSE PRACTITIONER

## 2022-09-01 PROCEDURE — 99213 OFFICE O/P EST LOW 20 MIN: CPT | Performed by: NURSE PRACTITIONER

## 2022-09-01 PROCEDURE — 3078F DIAST BP <80 MM HG: CPT | Performed by: NURSE PRACTITIONER

## 2022-09-01 RX ORDER — POLYETHYLENE GLYCOL 3350, SODIUM SULFATE ANHYDROUS, SODIUM BICARBONATE, SODIUM CHLORIDE, POTASSIUM CHLORIDE 236; 22.74; 6.74; 5.86; 2.97 G/4L; G/4L; G/4L; G/4L; G/4L
4000 POWDER, FOR SOLUTION ORAL ONCE
Qty: 4000 ML | Refills: 0 | OUTPATIENT
Start: 2022-09-01 | End: 2022-10-04

## 2022-09-01 NOTE — PROGRESS NOTES
[facility] - Outpatient Consultation  Maury Moreira 57 y o  male MRN: [id number]  Encounter: [id number]    ASSESSMENT AND PLAN:      1  Gastroesophageal reflux disease, unspecified whether esophagitis present  Patient states he is having increased episodes of acid reflux symptoms  States he was diagnosed with GERD after EGD in approximately 2000  States he has been taking omeprazole 40 mg daily and Pepcid 40 mg HS however states he does have breakthrough symptoms especially increased if misses any doses of medication  No change to current medication regiment until EGD completed  DDX:  GERD, functional dyspepsia, Barretts esophagus  Discussed with patient dietary discretion, food diary to assess for trigger foods  - reflux diet  - EGD schedule at [facility])    2  Screening for colon cancer  Colonoscopy 2017; 5 year recall    - Colonoscopy schedule at [facility])  - polyethylene glycol (Golytely) 4000 mL solution; Take 4,000 mL by mouth once for 1 dose Take 4000 mL by mouth once for 1 dose  Use as directed  Dispense: 4000 mL; Refill: 0      Followup Appointment:  3-4 weeks after procedure  ______________________________________________________________________    Chief Complaint   Patient presents with    Has a bump in his throat, would like to discuss EGD       HPI:   Maury Moreira is a 57y o  year old male with history of GERD, type 2 diabetes, RAOUL, hypertension and colon sigmoid bowel resection 2014 due to recurrent diverticulitis presents with concern for increased acid reflux symptoms and possibly due for colonoscopy  Patient states that he has been taking omeprazole 40 mg daily and Pepcid 40 mg HS and has been experiencing breakthrough symptoms especially if missing any doses  Patient states he has been having GERD symptoms and being treated since his last EGD in 2000  On exam, he denies nausea, vomiting or abdominal pain    States he is having daily normal bowel movements  Denies hematochezia or melena  Former smoker, daily ETOH drink  Patient's last colonoscopy was 2017; polyp cover diverticula, healthy anastomosis sigmoid colon      Historical Information   Past Medical History:   Diagnosis Date    Atrial premature complex     last assessed: 05/23/2012    Colon polyp     Depression 07/09/2012    Diabetes mellitus ([address] )     Diverticulitis     GERD (gastroesophageal reflux disease)     Herpes simplex infection     resolved: 07/26/2017    Hyperlipidemia     Hypertension     Nicotine dependence     last assessed: 94/93/3716    Umbilical hernia     last assessed: 07/30/2014     Past Surgical History:   Procedure Laterality Date    BOWEL RESECTION      COLON SIGMOID RESECTION LAPAROSCOPIC      onset: 07/22/2014;  partial-diverticulitis    COLONOSCOPY      UMBILICAL HERNIA REPAIR      onset: 07/22/2014     Social History     Substance and Sexual Activity   Alcohol Use Yes    Comment: Social/occasional      Social History     Substance and Sexual Activity   Drug Use No     Social History     Tobacco Use   Smoking Status Former Smoker   Smokeless Tobacco Never Used   Tobacco Comment    History of current every day smoker     Family History   Problem Relation Age of Onset    Depression Mother     Heart attack Father         acute    Depression Sister     Depression Sister     Colon cancer Neg Hx     Colon polyps Neg Hx        Meds/Allergies     Current Outpatient Medications:     amLODIPine-benazepril (LOTREL) 10-40 MG per capsule    Blood Glucose Monitoring Suppl (OneTouch Verio) w/Device KIT    famotidine (PEPCID) 40 MG tablet    metFORMIN (GLUCOPHAGE) 500 mg tablet    metoprolol succinate (TOPROL-XL) 25 mg 24 hr tablet    omeprazole (PriLOSEC) 40 MG capsule    OneTouch Delica Lancets 81J MISC    OneTouch Verio test strip    polyethylene glycol (Golytely) 4000 mL solution    QUEtiapine (SEROquel) 100 mg tablet    simvastatin (ZOCOR) 40 mg tablet    No Known Allergies    PHYSICAL EXAM:    Blood pressure 118/78, pulse 68, height 5' 8 5" (1 74 m), weight 108 kg (239 lb)  Body mass index is 35 81 kg/m²  Normal exam    General Appearance: NAD, cooperative, alert  Eyes: Anicteric, PERRLA, EOMI  ENT:  Normocephalic, atraumatic, normal mucosa  Neck:  Supple, symmetrical, trachea midline,   Resp:  Clear to auscultation bilaterally; no rales, rhonchi or wheezing; respirations unlabored   CV:  S1 S2, Regular rate and rhythm; no murmur, rub, or gallop  GI:  Soft, non-tender, non-distended; normal bowel sounds; no masses, no organomegaly   Rectal: Deferred  Musculoskeletal: No cyanosis, clubbing or edema  Normal ROM  Skin:  No jaundice, rashes, or lesions   Heme/Lymph: No palpable cervical lymphadenopathy  Psych: Normal affect, good eye contact  Neuro: No gross deficits, AAOx3    Lab Results:   Lab Results   Component Value Date    WBC 7 4 06/16/2022    HGB 13 4 06/16/2022    HCT 40 0 06/16/2022    MCV 89 06/16/2022     06/16/2022     Lab Results   Component Value Date     11/21/2017    K 4 9 06/16/2022     06/16/2022    CO2 23 06/16/2022    ANIONGAP 9 07/09/2014    BUN 26 06/16/2022    CREATININE 1 25 06/16/2022    GLUCOSE 124 (H) 11/21/2017    CALCIUM 9 6 11/21/2017    AST 17 06/16/2022    ALT 19 06/16/2022    ALKPHOS 63 11/21/2017    PROT 7 2 11/21/2017    BILITOT 0 4 11/21/2017    EGFR 64 06/16/2022     No results found for: IRON, TIBC, FERRITIN  No results found for: LIPASE    Radiology Results:   No results found  REVIEW OF SYSTEMS:    CONSTITUTIONAL: Denies any fever, chills, rigors, and weight loss  HEENT: No earache or tinnitus  Denies hearing loss or visual disturbances  CARDIOVASCULAR: No chest pain or palpitations  RESPIRATORY: Denies any cough, hemoptysis, shortness of breath or dyspnea on exertion  GASTROINTESTINAL: As noted in the History of Present Illness  GENITOURINARY: No problems with urination   Denies any hematuria or dysuria  NEUROLOGIC: No dizziness or vertigo, denies headaches  MUSCULOSKELETAL: Denies any muscle or joint pain  SKIN: Denies skin rashes or itching  ENDOCRINE: Denies excessive thirst  Denies intolerance to heat or cold  PSYCHOSOCIAL: Denies depression or anxiety  Denies any recent memory loss

## 2022-10-03 ENCOUNTER — ANESTHESIA EVENT (OUTPATIENT)
Dept: ANESTHESIOLOGY | Facility: AMBULATORY SURGERY CENTER | Age: 62
End: 2022-10-03

## 2022-10-03 ENCOUNTER — ANESTHESIA (OUTPATIENT)
Dept: ANESTHESIOLOGY | Facility: AMBULATORY SURGERY CENTER | Age: 62
End: 2022-10-03

## 2022-10-04 ENCOUNTER — ANESTHESIA EVENT (OUTPATIENT)
Dept: GASTROENTEROLOGY | Facility: AMBULATORY SURGERY CENTER | Age: 62
End: 2022-10-04

## 2022-10-04 ENCOUNTER — ANESTHESIA (OUTPATIENT)
Dept: GASTROENTEROLOGY | Facility: AMBULATORY SURGERY CENTER | Age: 62
End: 2022-10-04

## 2022-10-04 ENCOUNTER — HOSPITAL ENCOUNTER (OUTPATIENT)
Dept: GASTROENTEROLOGY | Facility: AMBULATORY SURGERY CENTER | Age: 62
Discharge: HOME | End: 2022-10-04
Payer: COMMERCIAL

## 2022-10-04 VITALS
SYSTOLIC BLOOD PRESSURE: 165 MMHG | OXYGEN SATURATION: 93 % | TEMPERATURE: 97.1 F | BODY MASS INDEX: 36.43 KG/M2 | HEIGHT: 69 IN | HEART RATE: 64 BPM | WEIGHT: 246 LBS | DIASTOLIC BLOOD PRESSURE: 73 MMHG | RESPIRATION RATE: 15 BRPM

## 2022-10-04 DIAGNOSIS — K21.9 GASTROESOPHAGEAL REFLUX DISEASE, UNSPECIFIED WHETHER ESOPHAGITIS PRESENT: ICD-10-CM

## 2022-10-04 DIAGNOSIS — Z12.11 SCREENING FOR COLON CANCER: ICD-10-CM

## 2022-10-04 PROCEDURE — 43239 EGD BIOPSY SINGLE/MULTIPLE: CPT | Performed by: INTERNAL MEDICINE

## 2022-10-04 PROCEDURE — G0105 COLORECTAL SCRN; HI RISK IND: HCPCS | Performed by: INTERNAL MEDICINE

## 2022-10-04 RX ORDER — LIDOCAINE HYDROCHLORIDE 20 MG/ML
INJECTION, SOLUTION EPIDURAL; INFILTRATION; INTRACAUDAL; PERINEURAL AS NEEDED
Status: DISCONTINUED | OUTPATIENT
Start: 2022-10-04 | End: 2022-10-04

## 2022-10-04 RX ORDER — SODIUM CHLORIDE, SODIUM LACTATE, POTASSIUM CHLORIDE, CALCIUM CHLORIDE 600; 310; 30; 20 MG/100ML; MG/100ML; MG/100ML; MG/100ML
INJECTION, SOLUTION INTRAVENOUS CONTINUOUS PRN
Status: DISCONTINUED | OUTPATIENT
Start: 2022-10-04 | End: 2022-10-04

## 2022-10-04 RX ORDER — PROPOFOL 10 MG/ML
INJECTION, EMULSION INTRAVENOUS AS NEEDED
Status: DISCONTINUED | OUTPATIENT
Start: 2022-10-04 | End: 2022-10-04

## 2022-10-04 RX ORDER — SODIUM CHLORIDE, SODIUM LACTATE, POTASSIUM CHLORIDE, CALCIUM CHLORIDE 600; 310; 30; 20 MG/100ML; MG/100ML; MG/100ML; MG/100ML
50 INJECTION, SOLUTION INTRAVENOUS CONTINUOUS
Status: DISCONTINUED | OUTPATIENT
Start: 2022-10-04 | End: 2022-10-04

## 2022-10-04 RX ADMIN — SODIUM CHLORIDE, SODIUM LACTATE, POTASSIUM CHLORIDE, CALCIUM CHLORIDE: 600; 310; 30; 20 INJECTION, SOLUTION INTRAVENOUS at 13:22

## 2022-10-04 RX ADMIN — PROPOFOL 100 MG: 10 INJECTION, EMULSION INTRAVENOUS at 13:47

## 2022-10-04 RX ADMIN — PROPOFOL 100 MG: 10 INJECTION, EMULSION INTRAVENOUS at 13:57

## 2022-10-04 RX ADMIN — SODIUM CHLORIDE, SODIUM LACTATE, POTASSIUM CHLORIDE, CALCIUM CHLORIDE: 600; 310; 30; 20 INJECTION, SOLUTION INTRAVENOUS at 14:08

## 2022-10-04 RX ADMIN — PROPOFOL 100 MG: 10 INJECTION, EMULSION INTRAVENOUS at 13:46

## 2022-10-04 RX ADMIN — LIDOCAINE HYDROCHLORIDE 100 MG: 20 INJECTION, SOLUTION EPIDURAL; INFILTRATION; INTRACAUDAL; PERINEURAL at 13:46

## 2022-10-04 RX ADMIN — PROPOFOL 100 MG: 10 INJECTION, EMULSION INTRAVENOUS at 13:52

## 2022-10-04 RX ADMIN — PROPOFOL 50 MG: 10 INJECTION, EMULSION INTRAVENOUS at 13:50

## 2022-10-04 RX ADMIN — SODIUM CHLORIDE, SODIUM LACTATE, POTASSIUM CHLORIDE, CALCIUM CHLORIDE 50 ML/HR: 600; 310; 30; 20 INJECTION, SOLUTION INTRAVENOUS at 13:30

## 2022-10-04 RX ADMIN — PROPOFOL 50 MG: 10 INJECTION, EMULSION INTRAVENOUS at 13:48

## 2022-10-04 RX ADMIN — PROPOFOL 50 MG: 10 INJECTION, EMULSION INTRAVENOUS at 13:49

## 2022-10-04 NOTE — ANESTHESIA POSTPROCEDURE EVALUATION
Post-Op Assessment Note    CV Status:  Stable  Pain Score: 0    Pain management: adequate     Mental Status:  Arousable and sleepy   Hydration Status:  Stable   PONV Controlled:  Controlled   Airway Patency:  Patent      Post Op Vitals Reviewed: Yes      Staff: CRNA         No complications documented      BP   101/56   Temp 97 6   Pulse 66   Resp 14   SpO2 99

## 2022-10-04 NOTE — ANESTHESIA PREPROCEDURE EVALUATION
Procedure:  PRE-OP ONLY    Relevant Problems   CARDIO   (+) Essential hypertension   (+) Mixed hyperlipidemia   (+) Symptomatic PVCs      ENDO   (+) Type 2 diabetes mellitus with hyperglycemia, without long-term current use of insulin (HCC)      GI/HEPATIC   (+) Fatty liver   (+) Gastroesophageal reflux disease without esophagitis   (+) Rectal bleeding      /RENAL   (+) BPH with obstruction/lower urinary tract symptoms      MUSCULOSKELETAL   (+) Primary osteoarthritis of right knee      NEURO/PSYCH   (+) Depression   (+) History of colonic polyps   (+) Personal history of nicotine dependence      PULMONARY   (+) Obstructive sleep apnea      Other   (+) Bradycardia   11/21    Left Ventricle: Left ventricular cavity size is normal  The left ventricular ejection fraction is 55%  Systolic function is normal  Wall motion is normal  Diastolic function is normal for age    Left Atrium: The atrium is mildly dilated    Mitral Valve: There is trace regurgitation    Tricuspid Valve: There is mild regurgitation  Anesthesia Plan  ASA Score- 3     Anesthesia Type- IV sedation with anesthesia with ASA Monitors  Additional Monitors:   Airway Plan:           Plan Factors-    Chart reviewed  Patient is not a current smoker  Induction- intravenous  Postoperative Plan-     Informed Consent- Anesthetic plan and risks discussed with patient  I personally reviewed this patient with the CRNA  Discussed and agreed on the Anesthesia Plan with the CRNA Gerhardt Sep

## 2022-10-04 NOTE — H&P
History and Physical - [facility]  Brandie Mesa 57 y o  male MRN: [id number]    HPI: Brandie Mesa is a 57y o  year old male who presents for EGD for evaluation of GERD and colonoscopy for surveillance of colonic polyps    REVIEW OF SYSTEMS: Per the HPI, and otherwise unremarkable      Historical Information   Past Medical History:   Diagnosis Date    Atrial premature complex     last assessed: 05/23/2012    Colon polyp     Depression 07/09/2012    Diabetes mellitus ([address] )     Diverticulitis     GERD (gastroesophageal reflux disease)     Herpes simplex infection     resolved: 07/26/2017    Hyperlipidemia     Hypertension     Nicotine dependence     last assessed: 33/35/4928    Umbilical hernia     last assessed: 07/30/2014     Past Surgical History:   Procedure Laterality Date    BOWEL RESECTION      COLON SIGMOID RESECTION LAPAROSCOPIC      onset: 07/22/2014;  partial-diverticulitis    COLONOSCOPY      UMBILICAL HERNIA REPAIR      onset: 07/22/2014     Social History   Social History     Substance and Sexual Activity   Alcohol Use Yes    Comment: Social/occasional      Social History     Substance and Sexual Activity   Drug Use No     Social History     Tobacco Use   Smoking Status Former Smoker   Smokeless Tobacco Never Used   Tobacco Comment    History of current every day smoker     Family History   Problem Relation Age of Onset    Depression Mother     Heart attack Father         acute    Depression Sister     Depression Sister     Colon cancer Neg Hx     Colon polyps Neg Hx        Meds/Allergies       Current Outpatient Medications:     amLODIPine-benazepril (LOTREL) 10-40 MG per capsule    famotidine (PEPCID) 40 MG tablet    metFORMIN (GLUCOPHAGE) 500 mg tablet    metoprolol succinate (TOPROL-XL) 25 mg 24 hr tablet    omeprazole (PriLOSEC) 40 MG capsule    QUEtiapine (SEROquel) 100 mg tablet    simvastatin (ZOCOR) 40 mg tablet    Blood Glucose Monitoring Suppl (OneTouch Verio) w/Device KIT    OneTouch Delica Lancets 15M MISC    OneTouch Verio test strip    polyethylene glycol (Golytely) 4000 mL solution    Current Facility-Administered Medications:     lactated ringers infusion, 50 mL/hr, Intravenous, Continuous, 50 mL/hr at 10/04/22 1330    No Known Allergies    Objective     /80   Pulse 64   Temp (!) 97 1 °F (36 2 °C) (Temporal)   Resp 19   Ht 5' 9" (1 753 m)   Wt 112 kg (246 lb)   SpO2 96%   BMI 36 33 kg/m²     PHYSICAL EXAM    Gen: NAD AAOx3  Head: Normocephalic, Atraumatic  CV: S1S2 RRR no m/r/g  CHEST: Clear b/l no c/r/w  ABD: soft, +BS NT/ND  EXT: no edema    ASSESSMENT/PLAN:  This is a 57y o  year old male here for EGD and colonoscopy, and he is stable and optimized for his procedure

## 2022-10-04 NOTE — ANESTHESIA PREPROCEDURE EVALUATION
Procedure:  COLONOSCOPY  EGD    Relevant Problems   CARDIO   (+) Essential hypertension   (+) Mixed hyperlipidemia   (+) Symptomatic PVCs      ENDO   (+) Type 2 diabetes mellitus with hyperglycemia, without long-term current use of insulin (HCC)      GI/HEPATIC   (+) Fatty liver   (+) Gastroesophageal reflux disease without esophagitis   (+) Rectal bleeding      /RENAL   (+) BPH with obstruction/lower urinary tract symptoms      MUSCULOSKELETAL   (+) Primary osteoarthritis of right knee      NEURO/PSYCH   (+) Depression   (+) History of colonic polyps   (+) Personal history of nicotine dependence      PULMONARY   (+) Obstructive sleep apnea   Uses Routine Bipap      11/21    Left Ventricle: Left ventricular cavity size is normal  The left ventricular ejection fraction is 55%  Systolic function is normal  Wall motion is normal  Diastolic function is normal for age    Left Atrium: The atrium is mildly dilated    Mitral Valve: There is trace regurgitation    Tricuspid Valve: There is mild regurgitation  Physical Exam    Airway    Mallampati score: III  TM Distance: >3 FB  Neck ROM: full     Dental   No notable dental hx     Cardiovascular      Pulmonary  Breath sounds clear to auscultation,     Other Findings        Anesthesia Plan  ASA Score- 3     Anesthesia Type- IV sedation with anesthesia with ASA Monitors  Additional Monitors:   Airway Plan:           Plan Factors-Exercise tolerance (METS): >4 METS  Chart reviewed  Patient is not a current smoker  Induction- intravenous  Postoperative Plan-     Informed Consent- Anesthetic plan and risks discussed with patient  I personally reviewed this patient with the CRNA  Discussed and agreed on the Anesthesia Plan with the CRNA  Tessa Ortiz

## 2022-10-10 ENCOUNTER — TELEPHONE (OUTPATIENT)
Dept: OBGYN CLINIC | Facility: MEDICAL CENTER | Age: 62
End: 2022-10-10

## 2022-10-10 DIAGNOSIS — E78.2 MIXED HYPERLIPIDEMIA: ICD-10-CM

## 2022-10-10 RX ORDER — SIMVASTATIN 40 MG
TABLET ORAL
Qty: 30 TABLET | Refills: 5 | Status: SHIPPED | OUTPATIENT
Start: 2022-10-10

## 2022-10-10 NOTE — TELEPHONE ENCOUNTER
Caller: Dian Layton  Doctor/office: [facility]  #: [phone]      Dian Layton called to start auth/delivery for VISCO [facility] ONE injections    Body Part: right knee  Date of last Gel Injection: 11/20/2020    Educated patient on Visco procedure  Medications must first be authorized and delivered to our office BEFORE we can schedule    Patient is scheduled to follow up with Dr Jose Julian on 10/19/2022, but would like the authorization process started as soon as possible  r/o lyme disease

## 2022-10-10 NOTE — TELEPHONE ENCOUNTER
Caller: Harjinder Ramsey    Doctor: Dr Renita Rocha    Reason for call: Synvisc-one injection, last was 11/2020 for right knee    Call back#: [phone] or [phone]

## 2022-10-11 DIAGNOSIS — M17.11 PRIMARY OSTEOARTHRITIS OF RIGHT KNEE: Primary | ICD-10-CM

## 2022-10-11 NOTE — TELEPHONE ENCOUNTER
It looks like patient's visco has been approved and will be available for patient to receive on 10/19/22 at his appointment  Called and informed patient of the approval and informed him that he can get it at his appointment  Patient appreciative and agrees with plan

## 2022-10-14 LAB
ALBUMIN SERPL-MCNC: 4.5 G/DL (ref 3.8–4.8)
ALBUMIN/GLOB SERPL: 1.8 {RATIO} (ref 1.2–2.2)
ALP SERPL-CCNC: 73 IU/L (ref 44–121)
ALT SERPL-CCNC: 18 IU/L (ref 0–44)
AST SERPL-CCNC: 17 IU/L (ref 0–40)
BILIRUB SERPL-MCNC: 0.5 MG/DL (ref 0–1.2)
BUN SERPL-MCNC: 20 MG/DL (ref 8–27)
BUN/CREAT SERPL: 15 (ref 10–24)
CALCIUM SERPL-MCNC: 9.4 MG/DL (ref 8.6–10.2)
CHLORIDE SERPL-SCNC: 103 MMOL/L (ref 96–106)
CHOLEST SERPL-MCNC: 155 MG/DL (ref 100–199)
CO2 SERPL-SCNC: 24 MMOL/L (ref 20–29)
CREAT SERPL-MCNC: 1.3 MG/DL (ref 0.76–1.27)
EGFR: 61 ML/MIN/1.73
EST. AVERAGE GLUCOSE BLD GHB EST-MCNC: 163 MG/DL
GLOBULIN SER-MCNC: 2.5 G/DL (ref 1.5–4.5)
GLUCOSE SERPL-MCNC: 135 MG/DL (ref 70–99)
HBA1C MFR BLD: 7.3 % (ref 4.8–5.6)
HDLC SERPL-MCNC: 37 MG/DL
LDLC SERPL CALC-MCNC: 87 MG/DL (ref 0–99)
LDLC/HDLC SERPL: 2.4 RATIO (ref 0–3.6)
POTASSIUM SERPL-SCNC: 5.3 MMOL/L (ref 3.5–5.2)
PROT SERPL-MCNC: 7 G/DL (ref 6–8.5)
SL AMB VLDL CHOLESTEROL CALC: 31 MG/DL (ref 5–40)
SODIUM SERPL-SCNC: 140 MMOL/L (ref 134–144)
TRIGL SERPL-MCNC: 182 MG/DL (ref 0–149)

## 2022-10-18 ENCOUNTER — OFFICE VISIT (OUTPATIENT)
Dept: FAMILY MEDICINE CLINIC | Facility: HOSPITAL | Age: 62
End: 2022-10-18

## 2022-10-18 VITALS
HEART RATE: 73 BPM | WEIGHT: 242.8 LBS | BODY MASS INDEX: 35.96 KG/M2 | OXYGEN SATURATION: 97 % | DIASTOLIC BLOOD PRESSURE: 69 MMHG | SYSTOLIC BLOOD PRESSURE: 119 MMHG | HEIGHT: 69 IN | TEMPERATURE: 97.3 F

## 2022-10-18 DIAGNOSIS — E13.9 RENAL CYSTS AND DIABETES SYNDROME (HCC): ICD-10-CM

## 2022-10-18 DIAGNOSIS — K21.9 GASTROESOPHAGEAL REFLUX DISEASE WITHOUT ESOPHAGITIS: ICD-10-CM

## 2022-10-18 DIAGNOSIS — I10 ESSENTIAL HYPERTENSION: ICD-10-CM

## 2022-10-18 DIAGNOSIS — K76.0 FATTY LIVER: ICD-10-CM

## 2022-10-18 DIAGNOSIS — Z12.5 SCREENING FOR MALIGNANT NEOPLASM OF PROSTATE: ICD-10-CM

## 2022-10-18 DIAGNOSIS — G47.33 OBSTRUCTIVE SLEEP APNEA: ICD-10-CM

## 2022-10-18 DIAGNOSIS — N18.31 STAGE 3A CHRONIC KIDNEY DISEASE (HCC): ICD-10-CM

## 2022-10-18 DIAGNOSIS — E11.65 TYPE 2 DIABETES MELLITUS WITH HYPERGLYCEMIA, WITHOUT LONG-TERM CURRENT USE OF INSULIN (HCC): Primary | ICD-10-CM

## 2022-10-18 DIAGNOSIS — E78.2 MIXED HYPERLIPIDEMIA: ICD-10-CM

## 2022-10-18 NOTE — PROGRESS NOTES
Name: Jose Angel Aviles      : 1960      MRN: [id number]  Encounter Provider: Curt Faria MD  Encounter Date: 10/18/2022   Encounter department: [facility]      1  Type 2 diabetes mellitus with hyperglycemia, without long-term current use of insulin (HCC)  -     Comprehensive metabolic panel; Future; Expected date: 2023  -     HEMOGLOBIN A1C W/ EAG ESTIMATION; Future; Expected date: 2023  -     Comprehensive metabolic panel  -     HEMOGLOBIN A1C W/ EAG ESTIMATION    2  Fatty liver    3  Gastroesophageal reflux disease without esophagitis    4  Mixed hyperlipidemia  -     Lipid Panel with Direct LDL reflex; Future; Expected date: 2023  -     Lipid Panel with Direct LDL reflex    5  Essential hypertension  -     CBC and Platelet; Future; Expected date: 2023  -     CBC and Platelet    6  Renal cysts and diabetes syndrome ([facility] [address] )  -     US abdomen complete; Future; Expected date: 10/18/2022    7  Stage 3a chronic kidney disease ([facility] [address] )  -     US abdomen complete; Future; Expected date: 10/18/2022    8  Obstructive sleep apnea  -     Bipap [employer] ([employer])    9  Screening for malignant neoplasm of prostate  -     PSA Total (Reflex To Free); Future; Expected date: 2023  -     PSA Total (Reflex To Free)           Subjective      4 month follow up    Having problem with R knee, will be getting injection by Dr Gill Harms    Was on CPAP that was recalled, needs new BiPAP machine    Labs reviewed in detail and copy given  Excellent control of metabolic measures    Review of Systems   Constitutional: Negative for unexpected weight change  Respiratory: Negative  Cardiovascular: Negative  Gastrointestinal: Negative  Genitourinary: Negative  Musculoskeletal: Positive for arthralgias and joint swelling  Psychiatric/Behavioral: Negative  All other systems reviewed and are negative        Current Outpatient Medications on File Prior to Visit   Medication Sig   • amLODIPine-benazepril (LOTREL) 10-40 MG per capsule TAKE 1 CAPSULE BY MOUTH EVERY DAY   • Blood Glucose Monitoring Suppl (OneTouch Verio) w/Device KIT Use 2 (two) times a day   • famotidine (PEPCID) 40 MG tablet Take 1 tablet (40 mg total) by mouth daily at bedtime   • metFORMIN (GLUCOPHAGE) 500 mg tablet TAKE ONE WITH BREAKFAST AND 2 WITH EVENING MEAL   • metoprolol succinate (TOPROL-XL) 25 mg 24 hr tablet TAKE 1 TABLET BY MOUTH EVERY DAY   • omeprazole (PriLOSEC) 40 MG capsule TAKE 1 CAPSULE BY MOUTH EVERY DAY   • OneTouch Delica Lancets 55U MISC Test self twice a day   • OneTouch Verio test strip USE 1 EACH 2 (TWO) TIMES A DAY USE AS INSTRUCTED   • QUEtiapine (SEROquel) 100 mg tablet TAKE 1 AND 1/2 TABLETS (150 MG TOTAL) BY MOUTH DAILY AT BEDTIME   • simvastatin (ZOCOR) 40 mg tablet TAKE 1 TABLET BY MOUTH EVERY DAY   • [DISCONTINUED] polyethylene glycol (Golytely) 4000 mL solution Take 4,000 mL by mouth once for 1 dose Take 4000 mL by mouth once for 1 dose  Use as directed       Objective     /69 (BP Location: Left arm, Patient Position: Sitting, Cuff Size: Large)   Pulse 73   Temp (!) 97 3 °F (36 3 °C) (Tympanic)   Ht 5' 9" (1 753 m)   Wt 110 kg (242 lb 12 8 oz)   SpO2 97%   BMI 35 86 kg/m²     Physical Exam  Vitals and nursing note reviewed  Constitutional:       Appearance: Normal appearance  Neck:      Vascular: No carotid bruit  Cardiovascular:      Rate and Rhythm: Normal rate and regular rhythm  Pulses: Normal pulses  Heart sounds: Normal heart sounds  Pulmonary:      Effort: Pulmonary effort is normal       Breath sounds: Normal breath sounds  Musculoskeletal:      Right lower leg: No edema  Left lower leg: No edema  Skin:     Findings: No rash  Neurological:      Mental Status: He is alert     Psychiatric:         Mood and Affect: Mood normal        Enrrique Wu MD 16

## 2022-10-19 ENCOUNTER — PROCEDURE VISIT (OUTPATIENT)
Dept: OBGYN CLINIC | Facility: MEDICAL CENTER | Age: 62
End: 2022-10-19
Payer: COMMERCIAL

## 2022-10-19 VITALS
SYSTOLIC BLOOD PRESSURE: 136 MMHG | DIASTOLIC BLOOD PRESSURE: 78 MMHG | HEART RATE: 62 BPM | BODY MASS INDEX: 35.84 KG/M2 | HEIGHT: 69 IN | WEIGHT: 242 LBS

## 2022-10-19 DIAGNOSIS — M17.11 PRIMARY OSTEOARTHRITIS OF RIGHT KNEE: Primary | ICD-10-CM

## 2022-10-19 PROCEDURE — 20610 DRAIN/INJ JOINT/BURSA W/O US: CPT | Performed by: PHYSICIAN ASSISTANT

## 2022-10-19 NOTE — PROGRESS NOTES
Ortho Sports Medicine Knee Visco Injection Visit     Assesment:   57 y o  male right knee OA    Plan:    Injection:  The risks and benefits of the injection (which include but are not limited to: infection, bleeding,damage to nerve/artery, need for further intervention), as well as the risks and benefits of all alternative treatments were explained and understood  The patient elected to proceed with injection  The procedure was done with aseptic technique, and the patient tolerated the procedure well with no complications  A viscosupplementation injection was performed  Ice to the knee 1-2 times daily for 20 minutes, for next 24-48 hrs  Follow up:  Return if symptoms worsen or fail to improve  Chief Complaint   Patient presents with   • Right Knee - Pain       History of Present Illness: The patient is returns for  Right knee Synvisc visco supplementation injection  Since the prior visit, He reports no improvement  Patient denies fevers, chills, and sweats  Denies numbness and tingling  Denies chest pain, shortness of breath, calf pain, and warmth to the knee  I have reviewed the past medical, surgical, social and family history, medications and allergies as documented in the EMR  Review of systems: ROS is negative other than that noted in the HPI  Constitutional: Negative for fatigue and fever  Physical Exam:    Blood pressure 136/78, pulse 62, height 5' 9" (1 753 m), weight 110 kg (242 lb)      General/Constitutional: NAD, well developed, well nourished  HENT: Normocephalic, atraumatic  CV: Intact distal pulses, regular rate  Resp: No respiratory distress or labored breathing  Lymphatic: No lymphadenopathy palpated  Neuro: Alert and Oriented x 3, no focal deficits  Psych: Normal mood, normal affect, normal judgement, normal behavior  Skin: Warm, dry, no rashes, no erythema    Large joint arthrocentesis: R knee  Universal Protocol:  Consent given by: patient  Time out: Immediately prior to procedure a "time out" was called to verify the correct patient, procedure, equipment, support staff and site/side marked as required    Timeout called at: 10/19/2022 10:31 AM   Patient understanding: patient states understanding of the procedure being performed  Site marked: the operative site was marked  Patient identity confirmed: verbally with patient    Supporting Documentation  Indications: pain   Procedure Details  Location: knee - R knee  Preparation: Patient was prepped and draped in the usual sterile fashion  Needle size: 22 G  Ultrasound guidance: no  Approach: lateral  Medications administered: 48 mg hylan 48 MG/6ML (Naropin 0 5%, 5mL)    Patient tolerance: patient tolerated the procedure well with no immediate complications  Dressing:  Sterile dressing applied

## 2022-10-23 ENCOUNTER — HOSPITAL ENCOUNTER (OUTPATIENT)
Dept: ULTRASOUND IMAGING | Facility: HOSPITAL | Age: 62
Discharge: HOME | End: 2022-10-23
Payer: COMMERCIAL

## 2022-10-23 DIAGNOSIS — N18.31 STAGE 3A CHRONIC KIDNEY DISEASE (HCC): ICD-10-CM

## 2022-10-23 DIAGNOSIS — E13.9 RENAL CYSTS AND DIABETES SYNDROME (HCC): ICD-10-CM

## 2022-10-23 PROCEDURE — 76700 US EXAM ABDOM COMPLETE: CPT

## 2022-10-27 ENCOUNTER — TELEPHONE (OUTPATIENT)
Dept: OBGYN CLINIC | Facility: HOSPITAL | Age: 62
End: 2022-10-27

## 2022-10-27 NOTE — TELEPHONE ENCOUNTER
Caller: patient    Doctor: Karmen Bruno    Reason for call: Please return call on cell [phone]      Call back#: n/a

## 2022-10-27 NOTE — TELEPHONE ENCOUNTER
Caller: Patient    Doctor: Baron Pedersen    Reason for call: Patient calling because he did not get relief from the injection  He usually does  He also stated that there was a lot of discharge/blood from the injection which never happened before  He's asking if the visco could have "leaked out"?       Call back#: [phone]

## 2022-10-28 NOTE — TELEPHONE ENCOUNTER
Caller: patient    Doctor/Office: Trey/ALL    Caller asked to speak to: Clinical staff regarding concerns about visco injection  Concerned because he has not heard back from doctor  Left message yesterday  Injection was 10/19/22      Call was transferred to: Triage nurse

## 2022-10-28 NOTE — TELEPHONE ENCOUNTER
Spoke with patient and he has some concerns that the hylan injection really went into joint space on 10/19  He said that someone other than Dr Benny Fry did the injection and it was a different process and did not seem like the liquid was as thick as usual and the person did not have to use force to push the injection in  It was in and out  There was a lot of bleeding afterwards on the way home  He had some bleeding down his leg that he needed to stop and wipe  Bleeding stopped by the time he got home  There was not s/s of infection at injection site, no swelling or bruising at injection site  Patient states he got relief by now (9 days after injection) and so far he is having more pain now than he did when he got shot  Advised we irritate the tissues after the injection and it can take 4-6 weeks to get full effect  He wants to speak to Dr Benny Fry himself as he is unsure if he actually got the gel injection inside the knee joint  Thinks it may have leaked out ? Please advise

## 2022-10-30 NOTE — PATIENT INSTRUCTIONS

## 2022-10-31 NOTE — TELEPHONE ENCOUNTER
Please let the patient know the injection was placed into the joint but can take up to 6 weeks to get relief, even if it has been shorter in the past  Please schedule to see us at 6 weeks from prior injection

## 2022-10-31 NOTE — TELEPHONE ENCOUNTER
Left patient detailed msg above and ask him to call back to make a 6 week appt for evaluation post visco on 10/19

## 2022-11-01 ENCOUNTER — TELEPHONE (OUTPATIENT)
Dept: FAMILY MEDICINE CLINIC | Facility: HOSPITAL | Age: 62
End: 2022-11-01

## 2022-11-01 NOTE — TELEPHONE ENCOUNTER
US shows mild fatty liver, no gallbladder problems  Kidney cysts have benign appearance and are just very slightly larger over the past 3 years    Continue weight control and diabetes control  No other testing needed at present time

## 2022-11-07 DIAGNOSIS — I10 ESSENTIAL HYPERTENSION: ICD-10-CM

## 2022-11-08 RX ORDER — AMLODIPINE BESYLATE AND BENAZEPRIL HYDROCHLORIDE 10; 40 MG/1; MG/1
1 CAPSULE ORAL DAILY
Qty: 30 CAPSULE | Refills: 4 | Status: SHIPPED | OUTPATIENT
Start: 2022-11-08

## 2022-11-09 DIAGNOSIS — K21.00 GASTROESOPHAGEAL REFLUX DISEASE WITH ESOPHAGITIS: ICD-10-CM

## 2022-11-09 RX ORDER — OMEPRAZOLE 40 MG/1
CAPSULE, DELAYED RELEASE ORAL
Qty: 30 CAPSULE | Refills: 5 | Status: SHIPPED | OUTPATIENT
Start: 2022-11-09

## 2022-11-11 DIAGNOSIS — F41.9 INSOMNIA SECONDARY TO ANXIETY: ICD-10-CM

## 2022-11-11 DIAGNOSIS — F51.05 INSOMNIA SECONDARY TO ANXIETY: ICD-10-CM

## 2022-11-11 RX ORDER — QUETIAPINE FUMARATE 100 MG/1
TABLET, FILM COATED ORAL
Qty: 45 TABLET | Refills: 5 | Status: SHIPPED | OUTPATIENT
Start: 2022-11-11

## 2022-11-30 ENCOUNTER — NURSE TRIAGE (OUTPATIENT)
Dept: OTHER | Facility: OTHER | Age: 62
End: 2022-11-30

## 2022-12-01 ENCOUNTER — TELEPHONE (OUTPATIENT)
Dept: FAMILY MEDICINE CLINIC | Facility: HOSPITAL | Age: 62
End: 2022-12-01

## 2022-12-01 NOTE — TELEPHONE ENCOUNTER
Spoke with pt, states he took the extra metformin as he was told to last night  Said his blood sugar was still elevated this morning  Pt states that he had [place] food last night for dinner  I explained to him that [place] is very high in sugar, fat and carbs and that is most likely the cause of this hyperglycemia  Advised him to increase his water intake today and try to eat low sugar and low carb foods today and we would call him tomorrow with any further instructions from you

## 2022-12-01 NOTE — TELEPHONE ENCOUNTER
Reason for Disposition  • [1] Blood glucose > 300 mg/dL (16 7 mmol/L) AND [2] two or more times in a row    Answer Assessment - Initial Assessment Questions  1  BLOOD GLUCOSE: "What is your blood glucose level?"       BS  300 earlier and now 356  2  ONSET: "When did you check the blood glucose?"      This evening  3  USUAL RANGE: "What is your glucose level usually?" (e g , usual fasting morning value, usual evening value)      Usual range is below 200  4  KETONES: "Do you check for ketones (urine or blood test strips)?" If yes, ask: "What does the test show now?"       no  5  TYPE 1 or 2:  "Do you know what type of diabetes you have?"  (e g , Type 1, Type 2, Gestational; doesn't know)       Type two  6  INSULIN: "Do you take insulin?" "What type of insulin(s) do you use? What is the mode of delivery? (syringe, pen; injection or pump)? "       none  7  DIABETES PILLS: "Do you take any pills for your diabetes?" If yes, ask: "Have you missed taking any pills recently?"      Metformin at 6 pm 1000 mg  8   OTHER SYMPTOMS: "Do you have any symptoms?" (e g , fever, frequent urination, difficulty breathing, dizziness, weakness, vomiting)      No other symptoms    Protocols used: DIABETES - HIGH BLOOD SUGAR-ADULT-

## 2022-12-01 NOTE — TELEPHONE ENCOUNTER
Blood sugar level was over 300 last night  Dr Julisa Nguyễn said to take another metformin and he did  Took another this morning and it is still 280  Was asked to call in  Please have someone call him today to advise what to do

## 2022-12-01 NOTE — TELEPHONE ENCOUNTER
Regardin BS  ----- Message from Osito Sargent sent at 2022 10:16 PM EST -----  " My blood sugar is  356 "

## 2022-12-08 DIAGNOSIS — I10 ESSENTIAL HYPERTENSION: ICD-10-CM

## 2022-12-08 DIAGNOSIS — E11.65 TYPE 2 DIABETES MELLITUS WITH HYPERGLYCEMIA, WITHOUT LONG-TERM CURRENT USE OF INSULIN (HCC): ICD-10-CM

## 2022-12-08 RX ORDER — METOPROLOL SUCCINATE 25 MG/1
TABLET, EXTENDED RELEASE ORAL
Qty: 30 TABLET | Refills: 5 | Status: SHIPPED | OUTPATIENT
Start: 2022-12-08

## 2022-12-19 ENCOUNTER — TELEPHONE (OUTPATIENT)
Dept: FAMILY MEDICINE CLINIC | Facility: HOSPITAL | Age: 62
End: 2022-12-19

## 2022-12-19 DIAGNOSIS — E11.65 TYPE 2 DIABETES MELLITUS WITH HYPERGLYCEMIA, WITHOUT LONG-TERM CURRENT USE OF INSULIN (HCC): Primary | ICD-10-CM

## 2022-12-19 RX ORDER — GLIMEPIRIDE 2 MG/1
2 TABLET ORAL
Qty: 30 TABLET | Refills: 5 | Status: SHIPPED | OUTPATIENT
Start: 2022-12-19 | End: 2023-01-04 | Stop reason: SDUPTHER

## 2022-12-19 NOTE — TELEPHONE ENCOUNTER
Patient calling saying he's worried about his sugars  Patient wants to know if he should come in sooner? Or if you want him to change his medications?      Out of 28 his sugars were   17- over 160   5- over 200  1- over 300    The rest were over 137-156    Patient says a message can be left

## 2022-12-22 ENCOUNTER — OFFICE VISIT (OUTPATIENT)
Dept: GASTROENTEROLOGY | Facility: CLINIC | Age: 62
End: 2022-12-22

## 2022-12-22 VITALS
WEIGHT: 240 LBS | BODY MASS INDEX: 35.55 KG/M2 | DIASTOLIC BLOOD PRESSURE: 64 MMHG | HEIGHT: 69 IN | SYSTOLIC BLOOD PRESSURE: 120 MMHG

## 2022-12-22 DIAGNOSIS — K62.5 RECTAL BLEEDING: ICD-10-CM

## 2022-12-22 DIAGNOSIS — K21.9 GASTROESOPHAGEAL REFLUX DISEASE WITHOUT ESOPHAGITIS: Primary | ICD-10-CM

## 2022-12-22 DIAGNOSIS — Z86.010 HISTORY OF COLON POLYPS: ICD-10-CM

## 2022-12-22 NOTE — PROGRESS NOTES
[id number] - Outpatient Follow-up Note  Mario Mitchell 57 y o  male MRN: [id number]  Encounter: [id number]    ASSESSMENT AND PLAN:      1  Gastroesophageal reflux disease without esophagitis  GERD symptoms stable without alarm symptoms of dysphagia weight loss or bleeding  Symptoms well controlled with just omeprazole daily  No longer using famotidine  Reflux diet and precautions  Continue omeprazole daily    2  Rectal bleeding  Colonoscopy unremarkable except for internal hemorrhoids, likely source of small intermittent bleeds  Discussed importance of fiber and fluids  Encourage dietary fiber and plenty of fluids  Recommend adding daily sugar-free fiber supplement    3  History of colon polyps  Up-to-date with polyp surveillance  Last colonoscopy negative  Recommend colonoscopy every 5 years  Next colonoscopy due October 2027      Followup Appointment: 18 months or sooner if needed  ______________________________________________________________________    No chief complaint on file  HPI:  Taking omeprazole daily and heartburn controlled  No dysphagia  Appetite, weight and stools stable  No longer needing to take famotidine  Occasional suprapubic cramping  Bowel movements otherwise regular, where blood on the toilet paper when stools are hard  Discussed fiber supplements      Historical Information   Past Medical History:   Diagnosis Date   • Atrial premature complex     last assessed: 05/23/2012   • Colon polyp    • Depression 07/09/2012   • Diabetes mellitus ([facility] [address] )    • Diverticulitis    • GERD (gastroesophageal reflux disease)    • Herpes simplex infection     resolved: 07/26/2017   • Hyperlipidemia    • Hypertension    • Nicotine dependence     last assessed: 68/54/7904   • Umbilical hernia     last assessed: 07/30/2014     Past Surgical History:   Procedure Laterality Date   • BOWEL RESECTION     • COLON SIGMOID RESECTION LAPAROSCOPIC      onset: 07/22/2014;  partial-diverticulitis   • COLONOSCOPY      October 2022: Healthy-appearing colorectal anastomosis, internal hemorrhoids, otherwise normal colonoscopy  Previous colonoscopy with polyps  • UMBILICAL HERNIA REPAIR      onset: 07/22/2014   • UPPER GASTROINTESTINAL ENDOSCOPY      October 2022: Irregular Z-line, otherwise normal EGD  Biopsies negative for Herndon's, EOE, H  pylori  Social History     Substance and Sexual Activity   Alcohol Use Yes    Comment: Social/occasional      Social History     Substance and Sexual Activity   Drug Use No     Social History     Tobacco Use   Smoking Status Former   Smokeless Tobacco Never   Tobacco Comments    History of current every day smoker     Family History   Problem Relation Age of Onset   • Depression Mother    • Heart attack Father         acute   • Depression Sister    • Depression Sister    • Colon cancer Neg Hx    • Colon polyps Neg Hx          Current Outpatient Medications:   •  amLODIPine-benazepril (LOTREL) 10-40 MG per capsule  •  Blood Glucose Monitoring Suppl (OneTouch Verio) w/Device KIT  •  famotidine (PEPCID) 40 MG tablet  •  glimepiride (AMARYL) 2 mg tablet  •  metFORMIN (GLUCOPHAGE) 500 mg tablet  •  metoprolol succinate (TOPROL-XL) 25 mg 24 hr tablet  •  omeprazole (PriLOSEC) 40 MG capsule  •  OneTouch Delica Lancets 36S MISC  •  OneTouch Verio test strip  •  QUEtiapine (SEROquel) 100 mg tablet  •  simvastatin (ZOCOR) 40 mg tablet  No Known Allergies  Reviewed medications and allergies and updated as indicated    PHYSICAL EXAM:    Blood pressure 120/64, height 5' 9" (1 753 m), weight 109 kg (240 lb)  Body mass index is 35 44 kg/m²  General Appearance: NAD, cooperative, alert  Eyes: Anicteric, PERRLA, EOMI  ENT:  Normocephalic, atraumatic, normal mucosa      Neck:  Supple, symmetrical, trachea midline  Resp:  Clear to auscultation bilaterally; no rales, rhonchi or wheezing; respirations unlabored   CV:  S1 S2, Regular rate and rhythm; no murmur, rub, or gallop  GI:  Soft, non-tender, non-distended; normal bowel sounds; no masses, no organomegaly   Rectal: Deferred  Musculoskeletal: No cyanosis, clubbing or edema  Normal ROM  Skin:  No jaundice, rashes, or lesions   Heme/Lymph: No palpable cervical lymphadenopathy  Psych: Normal affect, good eye contact  Neuro: No gross deficits, AAOx3    Lab Results:   Lab Results   Component Value Date    WBC 7 4 06/16/2022    HGB 13 4 06/16/2022    HCT 40 0 06/16/2022    MCV 89 06/16/2022     06/16/2022     Lab Results   Component Value Date     11/21/2017    K 5 3 (H) 10/13/2022     10/13/2022    CO2 24 10/13/2022    ANIONGAP 9 07/09/2014    BUN 20 10/13/2022    CREATININE 1 30 (H) 10/13/2022    GLUCOSE 124 (H) 11/21/2017    CALCIUM 9 6 11/21/2017    AST 17 10/13/2022    ALT 18 10/13/2022    ALKPHOS 63 11/21/2017    PROT 7 2 11/21/2017    BILITOT 0 4 11/21/2017    EGFR 61 10/13/2022     No results found for: IRON, TIBC, FERRITIN  No results found for: LIPASE    Radiology Results:   No results found

## 2022-12-22 NOTE — PATIENT INSTRUCTIONS
Reflux diet and precautions  Continue omeprazole daily  Use famotidine only as needed  Add sugar-free fiber supplement daily

## 2022-12-22 NOTE — LETTER
December 22, 2022     MD Julia Vann  [address] [facility]  [address] [address]    Patient: Amara Camargo   YOB: 1960   Date of Visit: 12/22/2022       Dear Dr Almonte Larger: Thank you for referring Cyndy Arce to me for evaluation  Below are my notes for this consultation  If you have questions, please do not hesitate to call me  I look forward to following your patient along with you  Sincerely,        Chantale Bradshaw MD        CC: No Recipients  Chantale Bradshaw MD  12/22/2022  1:29 PM  Sign when Signing Visit  [address] [facility] - Outpatient Follow-up Note  Amara Camargo 57 y o  male MRN: [id number]  Encounter: [id number]    ASSESSMENT AND PLAN:      1  Gastroesophageal reflux disease without esophagitis  GERD symptoms stable without alarm symptoms of dysphagia weight loss or bleeding  Symptoms well controlled with just omeprazole daily  No longer using famotidine  · Reflux diet and precautions  · Continue omeprazole daily    2  Rectal bleeding  Colonoscopy unremarkable except for internal hemorrhoids, likely source of small intermittent bleeds  Discussed importance of fiber and fluids  · Encourage dietary fiber and plenty of fluids  · Recommend adding daily sugar-free fiber supplement    3  History of colon polyps  Up-to-date with polyp surveillance  Last colonoscopy negative  Recommend colonoscopy every 5 years  · Next colonoscopy due October 2027      Followup Appointment: 18 months or sooner if needed  ______________________________________________________________________    No chief complaint on file  HPI:  Taking omeprazole daily and heartburn controlled  No dysphagia  Appetite, weight and stools stable  No longer needing to take famotidine  Occasional suprapubic cramping  Bowel movements otherwise regular, where blood on the toilet paper when stools are hard  Discussed fiber supplements      Historical Information   Past Medical History:   Diagnosis Date   • Atrial premature complex     last assessed: 05/23/2012   • Colon polyp    • Depression 07/09/2012   • Diabetes mellitus ([address] )    • Diverticulitis    • GERD (gastroesophageal reflux disease)    • Herpes simplex infection     resolved: 07/26/2017   • Hyperlipidemia    • Hypertension    • Nicotine dependence     last assessed: 95/45/2658   • Umbilical hernia     last assessed: 07/30/2014     Past Surgical History:   Procedure Laterality Date   • BOWEL RESECTION     • COLON SIGMOID RESECTION LAPAROSCOPIC      onset: 07/22/2014;  partial-diverticulitis   • COLONOSCOPY      October 2022: Healthy-appearing colorectal anastomosis, internal hemorrhoids, otherwise normal colonoscopy  Previous colonoscopy with polyps  • UMBILICAL HERNIA REPAIR      onset: 07/22/2014   • UPPER GASTROINTESTINAL ENDOSCOPY      October 2022: Irregular Z-line, otherwise normal EGD  Biopsies negative for Herndon's, EOE, H  pylori       Social History     Substance and Sexual Activity   Alcohol Use Yes    Comment: Social/occasional      Social History     Substance and Sexual Activity   Drug Use No     Social History     Tobacco Use   Smoking Status Former   Smokeless Tobacco Never   Tobacco Comments    History of current every day smoker     Family History   Problem Relation Age of Onset   • Depression Mother    • Heart attack Father         acute   • Depression Sister    • Depression Sister    • Colon cancer Neg Hx    • Colon polyps Neg Hx          Current Outpatient Medications:   •  amLODIPine-benazepril (LOTREL) 10-40 MG per capsule  •  Blood Glucose Monitoring Suppl (OneTouch Verio) w/Device KIT  •  famotidine (PEPCID) 40 MG tablet  •  glimepiride (AMARYL) 2 mg tablet  •  metFORMIN (GLUCOPHAGE) 500 mg tablet  •  metoprolol succinate (TOPROL-XL) 25 mg 24 hr tablet  •  omeprazole (PriLOSEC) 40 MG capsule  •  OneTouch Delica Lancets 70A MISC  •  OneTouch Verio test strip  •  QUEtiapine (SEROquel) 100 mg tablet  •  simvastatin (ZOCOR) 40 mg tablet  No Known Allergies  Reviewed medications and allergies and updated as indicated    PHYSICAL EXAM:    Blood pressure 120/64, height 5' 9" (1 753 m), weight 109 kg (240 lb)  Body mass index is 35 44 kg/m²  General Appearance: NAD, cooperative, alert  Eyes: Anicteric, PERRLA, EOMI  ENT:  Normocephalic, atraumatic, normal mucosa  Neck:  Supple, symmetrical, trachea midline  Resp:  Clear to auscultation bilaterally; no rales, rhonchi or wheezing; respirations unlabored   CV:  S1 S2, Regular rate and rhythm; no murmur, rub, or gallop  GI:  Soft, non-tender, non-distended; normal bowel sounds; no masses, no organomegaly   Rectal: Deferred  Musculoskeletal: No cyanosis, clubbing or edema  Normal ROM  Skin:  No jaundice, rashes, or lesions   Heme/Lymph: No palpable cervical lymphadenopathy  Psych: Normal affect, good eye contact  Neuro: No gross deficits, AAOx3    Lab Results:   Lab Results   Component Value Date    WBC 7 4 06/16/2022    HGB 13 4 06/16/2022    HCT 40 0 06/16/2022    MCV 89 06/16/2022     06/16/2022     Lab Results   Component Value Date     11/21/2017    K 5 3 (H) 10/13/2022     10/13/2022    CO2 24 10/13/2022    ANIONGAP 9 07/09/2014    BUN 20 10/13/2022    CREATININE 1 30 (H) 10/13/2022    GLUCOSE 124 (H) 11/21/2017    CALCIUM 9 6 11/21/2017    AST 17 10/13/2022    ALT 18 10/13/2022    ALKPHOS 63 11/21/2017    PROT 7 2 11/21/2017    BILITOT 0 4 11/21/2017    EGFR 61 10/13/2022     No results found for: IRON, TIBC, FERRITIN  No results found for: LIPASE    Radiology Results:   No results found

## 2022-12-30 LAB
ALBUMIN SERPL-MCNC: 4.5 G/DL (ref 3.8–4.8)
ALBUMIN/GLOB SERPL: 1.7 {RATIO} (ref 1.2–2.2)
ALP SERPL-CCNC: 78 IU/L (ref 44–121)
ALT SERPL-CCNC: 18 IU/L (ref 0–44)
AST SERPL-CCNC: 16 IU/L (ref 0–40)
BILIRUB SERPL-MCNC: 0.4 MG/DL (ref 0–1.2)
BUN SERPL-MCNC: 24 MG/DL (ref 8–27)
BUN/CREAT SERPL: 18 (ref 10–24)
CALCIUM SERPL-MCNC: 9.6 MG/DL (ref 8.6–10.2)
CHLORIDE SERPL-SCNC: 100 MMOL/L (ref 96–106)
CHOLEST SERPL-MCNC: 185 MG/DL (ref 100–199)
CO2 SERPL-SCNC: 22 MMOL/L (ref 20–29)
CREAT SERPL-MCNC: 1.37 MG/DL (ref 0.76–1.27)
EGFR: 58 ML/MIN/1.73
ERYTHROCYTE [DISTWIDTH] IN BLOOD BY AUTOMATED COUNT: 12.6 % (ref 11.6–15.4)
EST. AVERAGE GLUCOSE BLD GHB EST-MCNC: 183 MG/DL
GLOBULIN SER-MCNC: 2.7 G/DL (ref 1.5–4.5)
GLUCOSE SERPL-MCNC: 145 MG/DL (ref 70–99)
HBA1C MFR BLD: 8 % (ref 4.8–5.6)
HCT VFR BLD AUTO: 38.5 % (ref 37.5–51)
HDLC SERPL-MCNC: 36 MG/DL
HGB BLD-MCNC: 13.1 G/DL (ref 13–17.7)
LDLC SERPL CALC-MCNC: 107 MG/DL (ref 0–99)
LDLC/HDLC SERPL: 3 RATIO (ref 0–3.6)
MCH RBC QN AUTO: 30.1 PG (ref 26.6–33)
MCHC RBC AUTO-ENTMCNC: 34 G/DL (ref 31.5–35.7)
MCV RBC AUTO: 89 FL (ref 79–97)
PLATELET # BLD AUTO: 241 X10E3/UL (ref 150–450)
POTASSIUM SERPL-SCNC: 4.1 MMOL/L (ref 3.5–5.2)
PROT SERPL-MCNC: 7.2 G/DL (ref 6–8.5)
PSA SERPL-MCNC: 2.6 NG/ML (ref 0–4)
RBC # BLD AUTO: 4.35 X10E6/UL (ref 4.14–5.8)
SL AMB REFLEX CRITERIA: NORMAL
SL AMB VLDL CHOLESTEROL CALC: 42 MG/DL (ref 5–40)
SODIUM SERPL-SCNC: 138 MMOL/L (ref 134–144)
TRIGL SERPL-MCNC: 244 MG/DL (ref 0–149)
WBC # BLD AUTO: 7.3 X10E3/UL (ref 3.4–10.8)

## 2023-01-03 ENCOUNTER — TELEPHONE (OUTPATIENT)
Dept: FAMILY MEDICINE CLINIC | Facility: HOSPITAL | Age: 63
End: 2023-01-03

## 2023-01-04 DIAGNOSIS — E11.65 TYPE 2 DIABETES MELLITUS WITH HYPERGLYCEMIA, WITHOUT LONG-TERM CURRENT USE OF INSULIN (HCC): ICD-10-CM

## 2023-01-04 RX ORDER — GLIMEPIRIDE 2 MG/1
2 TABLET ORAL 2 TIMES DAILY
Qty: 60 TABLET | Refills: 5 | Status: SHIPPED | OUTPATIENT
Start: 2023-01-04

## 2023-01-04 NOTE — TELEPHONE ENCOUNTER
14 day sugars  Twice it was Over 200  Once it was 160  5 times it was in 150  140 (3x)  130 (8x)  120 (2x)  5 times being in this range 116, 112, 113, 116, 118  Below 100 2times  (97, 84)  Pt has lost weight since last visit, reports roughly 8-9lbs  He has been happy with his sugars at home, shocked his A1C has gone up   Advised pt continue to check numbers and bring those numbers to his visit in February  Advised we will call if you have anything else to add    Please advise, thank you

## 2023-01-06 ENCOUNTER — OFFICE VISIT (OUTPATIENT)
Dept: CARDIOLOGY CLINIC | Facility: CLINIC | Age: 63
End: 2023-01-06

## 2023-01-06 VITALS
WEIGHT: 240.2 LBS | BODY MASS INDEX: 35.58 KG/M2 | HEART RATE: 73 BPM | DIASTOLIC BLOOD PRESSURE: 70 MMHG | HEIGHT: 69 IN | SYSTOLIC BLOOD PRESSURE: 120 MMHG

## 2023-01-06 DIAGNOSIS — I49.3 SYMPTOMATIC PVCS: ICD-10-CM

## 2023-01-06 DIAGNOSIS — I10 ESSENTIAL HYPERTENSION: Primary | ICD-10-CM

## 2023-01-06 NOTE — PROGRESS NOTES
Cardiology Follow Up    Sujey Khalil  1960  [id number]  [address] [address] [phone]    1  Essential hypertension  POCT ECG      2  Symptomatic PVCs  POCT ECG          Interval History: Followup PVCs HTN    Doing well  No angina or palpitations  He has mild dyspnea on exertion  He remains active       Medical Problems     Problem List     BPH with obstruction/lower urinary tract symptoms    Chronic pain of right knee    Depression    Type 2 diabetes mellitus with hyperglycemia, without long-term current use of insulin ([facility] )    Overview Signed 4/10/2018  4:44 PM by Kaitlynn Del Rosario MD     Transitioned From: Hyperglycemia           Lab Results   Component Value Date    HGBA1C 8 0 (H) 12/29/2022         Diverticulosis of large intestine without perforation or abscess without bleeding    Esophagitis, reflux    Mixed hyperlipidemia    Essential hypertension    BMI 34 0-34 9,adult    Obstructive sleep apnea    Heart palpitations    Persistent insomnia    Myalgia due to statin    Bradycardia    Symptomatic PVCs    Rectal bleeding    History of colon polyps    Gastroesophageal reflux disease without esophagitis    Diverticulitis    Diverticular disease of colon    Annual physical exam    Primary osteoarthritis of right knee    Serous otitis media with rupture of tympanic membrane, left    Insomnia secondary to anxiety    Tinnitus of both ears    Fatty liver    Renal cysts and diabetes syndrome (HCC)      Lab Results   Component Value Date    HGBA1C 8 0 (H) 12/29/2022         Tongue lesion    Personal history of nicotine dependence    Stage 3a chronic kidney disease ([facility] )    Lab Results   Component Value Date    EGFR 58 (L) 12/29/2022    EGFR 61 10/13/2022    EGFR 64 06/16/2022    CREATININE 1 37 (H) 12/29/2022    CREATININE 1 30 (H) 10/13/2022    CREATININE 1 25 06/16/2022 Past Medical History:   Diagnosis Date   • Atrial premature complex     last assessed: 05/23/2012   • Colon polyp    • Depression 07/09/2012   • Diabetes mellitus ([facility] )    • Diverticulitis    • GERD (gastroesophageal reflux disease)    • Herpes simplex infection     resolved: 07/26/2017   • Hyperlipidemia    • Hypertension    • Nicotine dependence     last assessed: 43/81/4528   • Umbilical hernia     last assessed: 07/30/2014     Social History     Socioeconomic History   • Marital status: /Civil Union     Spouse name: Not on file   • Number of children: Not on file   • Years of education: Not on file   • Highest education level: Not on file   Occupational History   • Occupation: Full-time employment   Tobacco Use   • Smoking status: Former   • Smokeless tobacco: Never   • Tobacco comments:     History of current every day smoker   Vaping Use   • Vaping Use: Never used   Substance and Sexual Activity   • Alcohol use: Yes     Comment: Social/occasional    • Drug use: No   • Sexual activity: Yes     Partners: Female   Other Topics Concern   • Not on file   Social History Narrative   • Not on file     Social Determinants of Health     Financial Resource Strain: Not on file   Food Insecurity: Not on file   Transportation Needs: Not on file   Physical Activity: Not on file   Stress: Not on file   Social Connections: Not on file   Intimate Partner Violence: Not on file   Housing Stability: Not on file      Family History   Problem Relation Age of Onset   • Depression Mother    • Heart attack Father         acute   • Depression Sister    • Depression Sister    • Colon cancer Neg Hx    • Colon polyps Neg Hx      Past Surgical History:   Procedure Laterality Date   • BOWEL RESECTION     • COLON SIGMOID RESECTION LAPAROSCOPIC      onset: 07/22/2014;  partial-diverticulitis   • COLONOSCOPY      October 2022: Healthy-appearing colorectal anastomosis, internal hemorrhoids, otherwise normal colonoscopy    Previous colonoscopy with polyps  • UMBILICAL HERNIA REPAIR      onset: 07/22/2014   • UPPER GASTROINTESTINAL ENDOSCOPY      October 2022: Irregular Z-line, otherwise normal EGD  Biopsies negative for Herndon's, EOE, H  pylori         Current Outpatient Medications:   •  glimepiride (AMARYL) 2 mg tablet, Take 1 tablet (2 mg total) by mouth 2 (two) times a day, Disp: 60 tablet, Rfl: 5  •  amLODIPine-benazepril (LOTREL) 10-40 MG per capsule, Take 1 capsule by mouth daily, Disp: 30 capsule, Rfl: 4  •  Blood Glucose Monitoring Suppl (OneTouch Verio) w/Device KIT, Use 2 (two) times a day, Disp: 1 kit, Rfl: 0  •  famotidine (PEPCID) 40 MG tablet, Take 1 tablet (40 mg total) by mouth daily at bedtime, Disp: 30 tablet, Rfl: 3  •  metFORMIN (GLUCOPHAGE) 500 mg tablet, TAKE ONE WITH BREAKFAST AND TAKE 2 TABLETS WITH EVENING MEAL, Disp: 90 tablet, Rfl: 5  •  metoprolol succinate (TOPROL-XL) 25 mg 24 hr tablet, TAKE 1 TABLET BY MOUTH EVERY DAY, Disp: 30 tablet, Rfl: 5  •  omeprazole (PriLOSEC) 40 MG capsule, TAKE 1 CAPSULE BY MOUTH EVERY DAY, Disp: 30 capsule, Rfl: 5  •  OneTouch Delica Lancets 36F MISC, Test self twice a day, Disp: 200 each, Rfl: 3  •  OneTouch Verio test strip, USE 1 EACH 2 (TWO) TIMES A DAY USE AS INSTRUCTED, Disp: 50 strip, Rfl: 5  •  QUEtiapine (SEROquel) 100 mg tablet, TAKE 1 AND 1/2 TABLETS (150 MG TOTAL) BY MOUTH DAILY AT BEDTIME, Disp: 45 tablet, Rfl: 5  •  simvastatin (ZOCOR) 40 mg tablet, TAKE 1 TABLET BY MOUTH EVERY DAY, Disp: 30 tablet, Rfl: 5  No Known Allergies    Labs:     Chemistry        Component Value Date/Time     11/21/2017 0948    K 4 1 12/29/2022 0952     12/29/2022 0952    CO2 22 12/29/2022 0952    BUN 24 12/29/2022 0952    CREATININE 1 37 (H) 12/29/2022 0952    CREATININE 1 09 11/21/2017 0948        Component Value Date/Time    CALCIUM 9 6 11/21/2017 0948    ALKPHOS 63 11/21/2017 0948    AST 16 12/29/2022 0952    ALT 18 12/29/2022 0952    BILITOT 0 4 11/21/2017 0948            Lab Results   Component Value Date    CHOL 146 11/21/2017    CHOL 177 07/24/2017    CHOL 172 12/07/2016     Lab Results   Component Value Date    HDL 36 (L) 12/29/2022    HDL 37 (L) 10/13/2022    HDL 38 (L) 06/16/2022     Lab Results   Component Value Date    LDLCALC 107 (H) 12/29/2022    LDLCALC 87 10/13/2022    LDLCALC 89 06/16/2022     Lab Results   Component Value Date    TRIG 244 (H) 12/29/2022    TRIG 182 (H) 10/13/2022    TRIG 181 (H) 06/16/2022     Lab Results   Component Value Date    CHOLHDL 3 8 04/06/2018       Imaging: No results found  EKG: NSR with PVCs  Review of Systems   Constitutional: Negative  HENT: Negative  Eyes: Negative  Cardiovascular: Negative  Respiratory: Negative  Endocrine: Negative  Hematologic/Lymphatic: Negative  Skin: Negative  Musculoskeletal: Negative  Gastrointestinal: Negative  Genitourinary: Negative  Neurological: Negative  Psychiatric/Behavioral: Negative  Allergic/Immunologic: Negative  Vitals:    01/06/23 1048   BP: 120/70   Pulse: 73           Physical Exam  Vitals reviewed  Constitutional:       Appearance: Normal appearance  HENT:      Head: Normocephalic  Nose: Nose normal       Mouth/Throat:      Mouth: Mucous membranes are moist       Pharynx: Oropharynx is clear  Eyes:      General: No scleral icterus  Conjunctiva/sclera: Conjunctivae normal    Cardiovascular:      Rate and Rhythm: Normal rate and regular rhythm  Heart sounds: No murmur heard  No friction rub  No gallop  Pulmonary:      Effort: Pulmonary effort is normal  No respiratory distress  Breath sounds: Normal breath sounds  No wheezing or rales  Abdominal:      General: Abdomen is flat  Bowel sounds are normal  There is no distension  Palpations: Abdomen is soft  Tenderness: There is no abdominal tenderness  There is no guarding  Musculoskeletal:      Cervical back: Normal range of motion and neck supple        Right lower leg: No edema  Left lower leg: No edema  Skin:     General: Skin is warm and dry  Neurological:      General: No focal deficit present  Mental Status: He is alert and oriented to person, place, and time  Psychiatric:         Mood and Affect: Mood normal          Behavior: Behavior normal          Discussion/Summary:    1  PVCs: He is minimally symptomatic  He has a hx of longstanding PVCs  Continue Metoprolol  Ef is normal  No changes       2  HTN: His BP is well controlled today  No changes       3  Dyslipidemia: Last LDL was 107  Continue simvastatin  Mild coronary atherosclerosis on his coronary calcium score           The patient was counseled regarding diagnostic results, instructions for management, risk factor reductions, impressions  total time of encounter was 25 minutes and 15 minutes was spent counseling

## 2023-01-23 ENCOUNTER — TELEPHONE (OUTPATIENT)
Dept: FAMILY MEDICINE CLINIC | Facility: HOSPITAL | Age: 63
End: 2023-01-23

## 2023-01-23 NOTE — TELEPHONE ENCOUNTER
Advised to let us know when he needs next refill that he would like 90 day supply  He currently does not need anything refilled

## 2023-01-31 ENCOUNTER — TELEPHONE (OUTPATIENT)
Dept: FAMILY MEDICINE CLINIC | Facility: HOSPITAL | Age: 63
End: 2023-01-31

## 2023-01-31 NOTE — TELEPHONE ENCOUNTER
Do you want blood work done and if so call to let him know it has been ordered  If you want A1C done will it be in the office again? Does he need to fast for A1C? What tests do require fasting?

## 2023-02-01 DIAGNOSIS — E11.65 TYPE 2 DIABETES MELLITUS WITH HYPERGLYCEMIA, WITHOUT LONG-TERM CURRENT USE OF INSULIN (HCC): ICD-10-CM

## 2023-02-01 DIAGNOSIS — E11.65 TYPE 2 DIABETES MELLITUS WITH HYPERGLYCEMIA, WITHOUT LONG-TERM CURRENT USE OF INSULIN (HCC): Primary | ICD-10-CM

## 2023-02-01 RX ORDER — GLIMEPIRIDE 2 MG/1
TABLET ORAL
Qty: 180 TABLET | Refills: 2 | Status: SHIPPED | OUTPATIENT
Start: 2023-02-01

## 2023-02-08 DIAGNOSIS — K21.00 GASTROESOPHAGEAL REFLUX DISEASE WITH ESOPHAGITIS: ICD-10-CM

## 2023-02-08 DIAGNOSIS — I10 ESSENTIAL HYPERTENSION: ICD-10-CM

## 2023-02-08 DIAGNOSIS — E11.65 TYPE 2 DIABETES MELLITUS WITH HYPERGLYCEMIA, WITHOUT LONG-TERM CURRENT USE OF INSULIN (HCC): ICD-10-CM

## 2023-02-08 DIAGNOSIS — F51.05 INSOMNIA SECONDARY TO ANXIETY: ICD-10-CM

## 2023-02-08 DIAGNOSIS — F41.9 INSOMNIA SECONDARY TO ANXIETY: ICD-10-CM

## 2023-02-08 DIAGNOSIS — E78.2 MIXED HYPERLIPIDEMIA: ICD-10-CM

## 2023-02-08 RX ORDER — SIMVASTATIN 40 MG
TABLET ORAL
Qty: 90 TABLET | Refills: 2 | Status: SHIPPED | OUTPATIENT
Start: 2023-02-08

## 2023-02-08 RX ORDER — OMEPRAZOLE 40 MG/1
CAPSULE, DELAYED RELEASE ORAL
Qty: 90 CAPSULE | Refills: 2 | Status: SHIPPED | OUTPATIENT
Start: 2023-02-08

## 2023-02-08 RX ORDER — METOPROLOL SUCCINATE 25 MG/1
TABLET, EXTENDED RELEASE ORAL
Qty: 90 TABLET | Refills: 2 | Status: SHIPPED | OUTPATIENT
Start: 2023-02-08

## 2023-02-09 RX ORDER — QUETIAPINE FUMARATE 100 MG/1
TABLET, FILM COATED ORAL
Qty: 135 TABLET | Refills: 2 | Status: SHIPPED | OUTPATIENT
Start: 2023-02-09

## 2023-02-09 RX ORDER — AMLODIPINE BESYLATE AND BENAZEPRIL HYDROCHLORIDE 10; 40 MG/1; MG/1
CAPSULE ORAL
Qty: 90 CAPSULE | Refills: 2 | Status: SHIPPED | OUTPATIENT
Start: 2023-02-09

## 2023-02-18 LAB
ALBUMIN SERPL-MCNC: 4.6 G/DL (ref 3.8–4.8)
ALBUMIN/CREAT UR: 20 MG/G CREAT (ref 0–29)
ALBUMIN/GLOB SERPL: 1.6 {RATIO} (ref 1.2–2.2)
ALP SERPL-CCNC: 72 IU/L (ref 44–121)
ALT SERPL-CCNC: 18 IU/L (ref 0–44)
AST SERPL-CCNC: 21 IU/L (ref 0–40)
BASOPHILS # BLD AUTO: 0.1 X10E3/UL (ref 0–0.2)
BASOPHILS NFR BLD AUTO: 1 %
BILIRUB SERPL-MCNC: 0.4 MG/DL (ref 0–1.2)
BUN SERPL-MCNC: 22 MG/DL (ref 8–27)
BUN/CREAT SERPL: 17 (ref 10–24)
CALCIUM SERPL-MCNC: 9.4 MG/DL (ref 8.6–10.2)
CHLORIDE SERPL-SCNC: 104 MMOL/L (ref 96–106)
CO2 SERPL-SCNC: 19 MMOL/L (ref 20–29)
CREAT SERPL-MCNC: 1.27 MG/DL (ref 0.76–1.27)
CREAT UR-MCNC: 123.5 MG/DL
EGFR: 63 ML/MIN/1.73
EOSINOPHIL # BLD AUTO: 0.3 X10E3/UL (ref 0–0.4)
EOSINOPHIL NFR BLD AUTO: 5 %
ERYTHROCYTE [DISTWIDTH] IN BLOOD BY AUTOMATED COUNT: 12.6 % (ref 11.6–15.4)
EST. AVERAGE GLUCOSE BLD GHB EST-MCNC: 148 MG/DL
GLOBULIN SER-MCNC: 2.8 G/DL (ref 1.5–4.5)
GLUCOSE SERPL-MCNC: 131 MG/DL (ref 70–99)
HBA1C MFR BLD: 6.8 % (ref 4.8–5.6)
HCT VFR BLD AUTO: 40.2 % (ref 37.5–51)
HGB BLD-MCNC: 13.5 G/DL (ref 13–17.7)
IMM GRANULOCYTES # BLD: 0.1 X10E3/UL (ref 0–0.1)
IMM GRANULOCYTES NFR BLD: 1 %
LYMPHOCYTES # BLD AUTO: 1.9 X10E3/UL (ref 0.7–3.1)
LYMPHOCYTES NFR BLD AUTO: 28 %
MCH RBC QN AUTO: 29.6 PG (ref 26.6–33)
MCHC RBC AUTO-ENTMCNC: 33.6 G/DL (ref 31.5–35.7)
MCV RBC AUTO: 88 FL (ref 79–97)
MICROALBUMIN UR-MCNC: 24.9 UG/ML
MONOCYTES # BLD AUTO: 0.7 X10E3/UL (ref 0.1–0.9)
MONOCYTES NFR BLD AUTO: 10 %
NEUTROPHILS # BLD AUTO: 3.9 X10E3/UL (ref 1.4–7)
NEUTROPHILS NFR BLD AUTO: 55 %
PLATELET # BLD AUTO: 254 X10E3/UL (ref 150–450)
POTASSIUM SERPL-SCNC: 4.5 MMOL/L (ref 3.5–5.2)
PROT SERPL-MCNC: 7.4 G/DL (ref 6–8.5)
RBC # BLD AUTO: 4.56 X10E6/UL (ref 4.14–5.8)
SODIUM SERPL-SCNC: 139 MMOL/L (ref 134–144)
WBC # BLD AUTO: 6.9 X10E3/UL (ref 3.4–10.8)

## 2023-02-18 PROCEDURE — 3044F HG A1C LEVEL LT 7.0%: CPT | Performed by: FAMILY MEDICINE

## 2023-02-21 ENCOUNTER — OFFICE VISIT (OUTPATIENT)
Dept: FAMILY MEDICINE CLINIC | Facility: HOSPITAL | Age: 63
End: 2023-02-21

## 2023-02-21 VITALS
HEART RATE: 68 BPM | BODY MASS INDEX: 35.63 KG/M2 | HEIGHT: 69 IN | TEMPERATURE: 97.7 F | SYSTOLIC BLOOD PRESSURE: 139 MMHG | WEIGHT: 240.6 LBS | DIASTOLIC BLOOD PRESSURE: 70 MMHG | OXYGEN SATURATION: 98 %

## 2023-02-21 DIAGNOSIS — N28.1 BILATERAL RENAL CYSTS: ICD-10-CM

## 2023-02-21 DIAGNOSIS — K21.9 GASTROESOPHAGEAL REFLUX DISEASE WITHOUT ESOPHAGITIS: ICD-10-CM

## 2023-02-21 DIAGNOSIS — G47.33 OBSTRUCTIVE SLEEP APNEA: ICD-10-CM

## 2023-02-21 DIAGNOSIS — E11.65 TYPE 2 DIABETES MELLITUS WITH HYPERGLYCEMIA, WITHOUT LONG-TERM CURRENT USE OF INSULIN (HCC): ICD-10-CM

## 2023-02-21 DIAGNOSIS — I10 ESSENTIAL HYPERTENSION: Primary | ICD-10-CM

## 2023-02-21 DIAGNOSIS — E78.2 MIXED HYPERLIPIDEMIA: ICD-10-CM

## 2023-02-21 NOTE — PROGRESS NOTES
Name: Diego Dudley      : 1960      MRN: [id number]  Encounter Provider: Cristy Neal MD  Encounter Date: 2023   Encounter department: [facility]     1  Essential hypertension  -     CBC and differential; Future; Expected date: 2023  -     Comprehensive metabolic panel; Future; Expected date: 2023  -     TSH, 3rd generation with Free T4 reflex; Future; Expected date: 2023  -     CBC and differential  -     Comprehensive metabolic panel  -     TSH, 3rd generation with Free T4 reflex    2  Gastroesophageal reflux disease without esophagitis    3  Type 2 diabetes mellitus with hyperglycemia, without long-term current use of insulin (HCC)  -     HEMOGLOBIN A1C W/ EAG ESTIMATION; Future; Expected date: 2023  -     HEMOGLOBIN A1C W/ EAG ESTIMATION    4  Obstructive sleep apnea    5  Mixed hyperlipidemia  -     Lipid Panel with Direct LDL reflex; Future; Expected date: 2023  -     Lipid Panel with Direct LDL reflex    6  Bilateral renal cysts      BMI Counseling: Body mass index is 35 53 kg/m²  The BMI is above normal  Nutrition recommendations include decreasing portion sizes, encouraging healthy choices of fruits and vegetables, limiting drinks that contain sugar and moderation in carbohydrate intake  Exercise recommendations include vigorous physical activity 75 minutes/week  No pharmacotherapy was ordered  Rationale for BMI follow-up plan is due to patient being overweight or obese  Subjective      4 month follow up  Worried about health in general lacie relating to DM  Some numbers in the 60's and 70's  Highest is into 200's with bread and rice  Continues to watch carbs    Some flank pains, he is worried about kidneys and cysts    Will be switching to Medicare near future    Review of Systems   Constitutional: Negative for fatigue and unexpected weight change  HENT: Negative      Eyes: Negative for visual disturbance  Respiratory: Negative  Cardiovascular: Negative  Gastrointestinal: Negative  Genitourinary: Negative  Musculoskeletal: Positive for arthralgias  Psychiatric/Behavioral: Negative  All other systems reviewed and are negative  Current Outpatient Medications on File Prior to Visit   Medication Sig   • amLODIPine-benazepril (LOTREL) 10-40 MG per capsule TAKE 1 CAPSULE BY MOUTH EVERY DAY   • Blood Glucose Monitoring Suppl (OneTouch Verio) w/Device KIT Use 2 (two) times a day   • famotidine (PEPCID) 40 MG tablet Take 1 tablet (40 mg total) by mouth daily at bedtime   • glimepiride (AMARYL) 2 mg tablet TAKE 1 TABLET BY MOUTH 2 TIMES A DAY  • metFORMIN (GLUCOPHAGE) 500 mg tablet TAKE ONE WITH BREAKFAST AND TAKE 2 TABLETS WITH EVENING MEAL   • metoprolol succinate (TOPROL-XL) 25 mg 24 hr tablet TAKE 1 TABLET BY MOUTH EVERY DAY   • omeprazole (PriLOSEC) 40 MG capsule TAKE 1 CAPSULE BY MOUTH EVERY DAY   • OneTouch Delica Lancets 28E MISC Test self twice a day   • OneTouch Verio test strip USE 1 EACH 2 (TWO) TIMES A DAY USE AS INSTRUCTED   • QUEtiapine (SEROquel) 100 mg tablet TAKE 1 AND 1/2 TABLETS (150 MG TOTAL) BY MOUTH DAILY AT BEDTIME   • simvastatin (ZOCOR) 40 mg tablet TAKE 1 TABLET BY MOUTH EVERY DAY   • [DISCONTINUED] polyethylene glycol (Golytely) 4000 mL solution Take 4,000 mL by mouth once for 1 dose Take 4000 mL by mouth once for 1 dose  Use as directed       Objective     /70 (BP Location: Left arm, Patient Position: Sitting, Cuff Size: Large)   Pulse 68   Temp 97 7 °F (36 5 °C) (Tympanic)   Ht 5' 9" (1 753 m)   Wt 109 kg (240 lb 9 6 oz)   SpO2 98%   BMI 35 53 kg/m²     Physical Exam  Vitals and nursing note reviewed  Constitutional:       Appearance: Normal appearance  Neck:      Vascular: No carotid bruit  Cardiovascular:      Rate and Rhythm: Normal rate and regular rhythm  Pulses: Normal pulses  Heart sounds: Normal heart sounds  Pulmonary:      Effort: Pulmonary effort is normal       Breath sounds: Normal breath sounds  Neurological:      General: No focal deficit present  Mental Status: He is alert and oriented to person, place, and time     Psychiatric:         Mood and Affect: Mood normal        Luc Mahmood MD

## 2023-02-26 NOTE — PATIENT INSTRUCTIONS
What to Do if Your Blood Sugar is Low   AMBULATORY CARE:   Low blood sugar levels  (hypoglycemia) can happen with Type 1 and Type 2 diabetes  Low levels are more likely to happen if you use insulin  Hypoglycemia can cause you to have falls, accidents, and injuries  A blood sugar level that gets too low can lead to seizures, coma, and death  Learn to recognize the symptoms early so you can get treatment quickly  When your blood sugar is low you may feel:  • Sweaty    • Nervous or shaky    • Anxious or irritable    • Confused    • A fast, pounding heartbeat    • Extremely hungry    Have someone call your local emergency number (911 in the [address]) if:   • You cannot be woken  • You have a seizure  Call your doctor if:   • You have symptoms of a low blood sugar level, such as trouble thinking, sweating, or a pounding heartbeat  • Your blood sugar level is lower than normal and it does not improve with treatment  • You often have lower blood sugar levels than your target goals  • You have trouble coping with your illness, or you feel anxious or depressed  • You have questions or concerns about your condition or care  What to do if you have symptoms of low blood sugar:   • Check your blood sugar level, if possible  Your blood sugar level is too low if it is at or below 70 mg/dL  • Eat or drink 15 grams of fast-acting carbohydrate  Fast-acting carbohydrates will raise your blood sugar level quickly  Examples of 15 grams of fast-acting carbohydrates:     ? 4 ounces (½ cup) of fruit juice     ? 4 ounces of regular soda    ? 2 tablespoons of raisins     ? 1 tube of glucose gel or 3 to 4 glucose tablets       • Check your blood sugar level 15 minutes later  If the level is still low (less than 100 mg/dL), eat another 15 grams of carbohydrate  When the level returns to 100 mg/dL, eat a snack or meal that contains carbohydrates  This will help prevent another drop in blood sugar      • Teach people close to you how to use your glucagon kit  Your blood sugar may be too low for you to be awake  People need to know when and how to use your kit  Prevent low blood sugar levels:  Prevent low blood sugar by knowing what increases your risk  Ask your healthcare provider for ways to prevent low blood sugar levels  Any of the following can increase your risk of low blood sugar:  • Fasting for tests or procedures    • During or after intense exercise    • Late or postponed meals    • Sleeping (you may need a bedtime snack)     • Drinking alcohol if you use insulin or insulin releasing pills    Follow up with your doctor as directed:  Write down your questions so you remember to ask them during your visits  © Copyright [employer] March 2022 Information is for End User's use only and may not be sold, redistributed or otherwise used for commercial purposes  The above information is an  only  It is not intended as medical advice for individual conditions or treatments  Talk to your doctor, nurse or pharmacist before following any medical regimen to see if it is safe and effective for you

## 2023-03-13 DIAGNOSIS — E78.2 MIXED HYPERLIPIDEMIA: ICD-10-CM

## 2023-03-13 DIAGNOSIS — E11.65 TYPE 2 DIABETES MELLITUS WITH HYPERGLYCEMIA, WITHOUT LONG-TERM CURRENT USE OF INSULIN (HCC): Primary | ICD-10-CM

## 2023-03-13 DIAGNOSIS — F41.9 INSOMNIA SECONDARY TO ANXIETY: ICD-10-CM

## 2023-03-13 DIAGNOSIS — K21.00 GASTROESOPHAGEAL REFLUX DISEASE WITH ESOPHAGITIS: ICD-10-CM

## 2023-03-13 DIAGNOSIS — F51.05 INSOMNIA SECONDARY TO ANXIETY: ICD-10-CM

## 2023-03-13 DIAGNOSIS — I10 ESSENTIAL HYPERTENSION: ICD-10-CM

## 2023-03-13 RX ORDER — SIMVASTATIN 40 MG
40 TABLET ORAL DAILY
Qty: 90 TABLET | Refills: 2 | Status: SHIPPED | OUTPATIENT
Start: 2023-03-13

## 2023-03-13 RX ORDER — OMEPRAZOLE 40 MG/1
40 CAPSULE, DELAYED RELEASE ORAL DAILY
Qty: 90 CAPSULE | Refills: 2 | Status: SHIPPED | OUTPATIENT
Start: 2023-03-13

## 2023-03-13 RX ORDER — QUETIAPINE FUMARATE 100 MG/1
TABLET, FILM COATED ORAL
Qty: 135 TABLET | Refills: 2 | Status: SHIPPED | OUTPATIENT
Start: 2023-03-13

## 2023-03-13 RX ORDER — AMLODIPINE BESYLATE AND BENAZEPRIL HYDROCHLORIDE 10; 40 MG/1; MG/1
1 CAPSULE ORAL DAILY
Qty: 90 CAPSULE | Refills: 2 | Status: SHIPPED | OUTPATIENT
Start: 2023-03-13

## 2023-03-13 RX ORDER — DIPHENHYDRAMINE HCL 25 MG
TABLET ORAL 2 TIMES DAILY
Qty: 1 KIT | Refills: 0 | Status: SHIPPED | OUTPATIENT
Start: 2023-03-13

## 2023-03-13 RX ORDER — CALCIUM CITRATE/VITAMIN D3 200MG-6.25
TABLET ORAL
Qty: 200 STRIP | Refills: 1 | Status: SHIPPED | OUTPATIENT
Start: 2023-03-13

## 2023-03-13 RX ORDER — GLUCOSAM/CHON-MSM1/C/MANG/BOSW 500-416.6
TABLET ORAL 2 TIMES DAILY
Qty: 200 EACH | Refills: 1 | Status: SHIPPED | OUTPATIENT
Start: 2023-03-13

## 2023-03-14 DIAGNOSIS — E11.65 TYPE 2 DIABETES MELLITUS WITH HYPERGLYCEMIA, WITHOUT LONG-TERM CURRENT USE OF INSULIN (HCC): ICD-10-CM

## 2023-03-14 DIAGNOSIS — I10 ESSENTIAL HYPERTENSION: ICD-10-CM

## 2023-03-14 RX ORDER — GLIMEPIRIDE 2 MG/1
2 TABLET ORAL 2 TIMES DAILY
Qty: 180 TABLET | Refills: 1 | Status: SHIPPED | OUTPATIENT
Start: 2023-03-14

## 2023-03-14 RX ORDER — METOPROLOL SUCCINATE 25 MG/1
25 TABLET, EXTENDED RELEASE ORAL DAILY
Qty: 90 TABLET | Refills: 2 | Status: SHIPPED | OUTPATIENT
Start: 2023-03-14

## 2023-03-23 ENCOUNTER — TELEPHONE (OUTPATIENT)
Dept: FAMILY MEDICINE CLINIC | Facility: HOSPITAL | Age: 63
End: 2023-03-23

## 2023-03-23 NOTE — TELEPHONE ENCOUNTER
Old CPAP is being discontinued  Wants us to know that [facility] will be reaching out with information requesting rx for new machine  Pt would like to be notified when this happens and new order is placed

## 2023-05-04 ENCOUNTER — VBI (OUTPATIENT)
Dept: ADMINISTRATIVE | Facility: OTHER | Age: 63
End: 2023-05-04

## 2023-05-05 LAB
LEFT EYE DIABETIC RETINOPATHY: NORMAL
RIGHT EYE DIABETIC RETINOPATHY: NORMAL

## 2023-06-05 DIAGNOSIS — E11.65 TYPE 2 DIABETES MELLITUS WITH HYPERGLYCEMIA, WITHOUT LONG-TERM CURRENT USE OF INSULIN (HCC): ICD-10-CM

## 2023-06-05 DIAGNOSIS — E78.2 MIXED HYPERLIPIDEMIA: ICD-10-CM

## 2023-06-05 RX ORDER — GLIMEPIRIDE 2 MG/1
2 TABLET ORAL 2 TIMES DAILY
Qty: 180 TABLET | Refills: 1 | Status: SHIPPED | OUTPATIENT
Start: 2023-06-05

## 2023-06-05 RX ORDER — SIMVASTATIN 40 MG
TABLET ORAL
Qty: 90 TABLET | Refills: 2 | Status: SHIPPED | OUTPATIENT
Start: 2023-06-05

## 2023-06-05 RX ORDER — BLOOD SUGAR DIAGNOSTIC
STRIP MISCELLANEOUS
Qty: 50 STRIP | Refills: 5 | Status: SHIPPED | OUTPATIENT
Start: 2023-06-05 | End: 2023-06-06 | Stop reason: SDUPTHER

## 2023-06-05 NOTE — TELEPHONE ENCOUNTER
Pt requesting the meter and other supplies that go along with the OneTouch Verio(only test strips were requested originally)

## 2023-06-05 NOTE — TELEPHONE ENCOUNTER
Requested medication(s) are due for refill today: Yes  Patient has already received a courtesy refill: No  Other reason request has been forwarded to provider:  would you be able to send all supplies?

## 2023-06-06 DIAGNOSIS — E11.65 TYPE 2 DIABETES MELLITUS WITH HYPERGLYCEMIA, WITHOUT LONG-TERM CURRENT USE OF INSULIN (HCC): ICD-10-CM

## 2023-06-06 DIAGNOSIS — E11.65 TYPE 2 DIABETES MELLITUS WITH HYPERGLYCEMIA, WITHOUT LONG-TERM CURRENT USE OF INSULIN (HCC): Primary | ICD-10-CM

## 2023-06-06 RX ORDER — BLOOD SUGAR DIAGNOSTIC
1 STRIP MISCELLANEOUS DAILY
Qty: 50 STRIP | Refills: 5 | Status: SHIPPED | OUTPATIENT
Start: 2023-06-06 | End: 2023-06-06

## 2023-06-06 RX ORDER — BLOOD SUGAR DIAGNOSTIC
1 STRIP MISCELLANEOUS DAILY
Qty: 50 STRIP | Refills: 5 | Status: SHIPPED | OUTPATIENT
Start: 2023-06-06

## 2023-06-06 NOTE — TELEPHONE ENCOUNTER
Spoke with pt, would like all new diabetic supplies ordered  Spoke with medicare and they state they will cover the one touch  Asking for meter, test strips and lancets  Can only test once a day per medicare  Asking for it to be sent to [facility] on [address]    Do you want me to place orders for you and  you can sign off on them , please advise thank you

## 2023-06-07 RX ORDER — BLOOD-GLUCOSE METER
EACH MISCELLANEOUS DAILY
Qty: 1 KIT | Refills: 0 | Status: SHIPPED | OUTPATIENT
Start: 2023-06-07

## 2023-06-07 RX ORDER — LANCETS
EACH MISCELLANEOUS
Qty: 100 EACH | Refills: 3 | Status: SHIPPED | OUTPATIENT
Start: 2023-06-07

## 2023-06-09 DIAGNOSIS — E11.65 TYPE 2 DIABETES MELLITUS WITH HYPERGLYCEMIA, WITHOUT LONG-TERM CURRENT USE OF INSULIN (HCC): ICD-10-CM

## 2023-06-09 NOTE — TELEPHONE ENCOUNTER
Spoke to pt, he reports his insurance will cover the one touch  Did speak with pharmacy who had been in touch with pt's insurance and according to insurance pt [facility] day supply of the one touch in May and isn't due for refill until August, left detailed message with pt

## 2023-06-12 ENCOUNTER — TELEPHONE (OUTPATIENT)
Dept: FAMILY MEDICINE CLINIC | Facility: HOSPITAL | Age: 63
End: 2023-06-12

## 2023-06-12 NOTE — TELEPHONE ENCOUNTER
PA was completed earlier today over the phone with [facility], they will fax response once they have a decision on approved/denied

## 2023-06-20 DIAGNOSIS — E11.65 TYPE 2 DIABETES MELLITUS WITH HYPERGLYCEMIA, WITHOUT LONG-TERM CURRENT USE OF INSULIN (HCC): ICD-10-CM

## 2023-06-20 RX ORDER — BLOOD SUGAR DIAGNOSTIC
1 STRIP MISCELLANEOUS DAILY
Qty: 50 STRIP | Refills: 5 | Status: SHIPPED | OUTPATIENT
Start: 2023-06-20

## 2023-06-20 RX ORDER — BLOOD SUGAR DIAGNOSTIC
1 STRIP MISCELLANEOUS DAILY
Qty: 50 STRIP | Refills: 5 | Status: SHIPPED | OUTPATIENT
Start: 2023-06-20 | End: 2023-06-20 | Stop reason: CLARIF

## 2023-06-20 RX ORDER — BLOOD-GLUCOSE METER
EACH MISCELLANEOUS DAILY
Qty: 1 KIT | Refills: 0 | Status: SHIPPED | OUTPATIENT
Start: 2023-06-20

## 2023-06-21 ENCOUNTER — TELEPHONE (OUTPATIENT)
Dept: FAMILY MEDICINE CLINIC | Facility: HOSPITAL | Age: 63
End: 2023-06-21

## 2023-06-21 NOTE — TELEPHONE ENCOUNTER
GONZALES SAID NICK CALLED HIM REGARDING HIS LAB RESULTS (DIABETES) HE WOULD LIKE A CALL BACK FROM HER - HE HAS A FEW QUESTIONS

## 2023-06-21 NOTE — TELEPHONE ENCOUNTER
Pharmacy called stating that pts insurance does not cover one touch glucose meter. They are asking for a new script for accu-chek. Are you able to send in a new script for pt today including the meter supplies please?

## 2023-06-21 NOTE — TELEPHONE ENCOUNTER
FYI: Spoke to pharmacy, they report the meter was approved however the test strips were not running through as approved. Spoke to insurance who confirmed that the device and test strips were approved. Called Pharmacy and made aware. Also gave them the help line number ([phone] to call if they continue having issues getting the test strips to run through successfully.

## 2023-06-23 LAB
ALBUMIN SERPL-MCNC: 4.3 G/DL (ref 3.8–4.8)
ALBUMIN/GLOB SERPL: 1.7 {RATIO} (ref 1.2–2.2)
ALP SERPL-CCNC: 65 IU/L (ref 44–121)
ALT SERPL-CCNC: 21 IU/L (ref 0–44)
AST SERPL-CCNC: 20 IU/L (ref 0–40)
BASOPHILS # BLD AUTO: 0.1 X10E3/UL (ref 0–0.2)
BASOPHILS NFR BLD AUTO: 1 %
BILIRUB SERPL-MCNC: 0.4 MG/DL (ref 0–1.2)
BUN SERPL-MCNC: 24 MG/DL (ref 8–27)
BUN/CREAT SERPL: 20 (ref 10–24)
CALCIUM SERPL-MCNC: 9.4 MG/DL (ref 8.6–10.2)
CHLORIDE SERPL-SCNC: 104 MMOL/L (ref 96–106)
CHOLEST SERPL-MCNC: 171 MG/DL (ref 100–199)
CO2 SERPL-SCNC: 23 MMOL/L (ref 20–29)
CREAT SERPL-MCNC: 1.23 MG/DL (ref 0.76–1.27)
EGFR: 65 ML/MIN/1.73
EOSINOPHIL # BLD AUTO: 0.2 X10E3/UL (ref 0–0.4)
EOSINOPHIL NFR BLD AUTO: 3 %
ERYTHROCYTE [DISTWIDTH] IN BLOOD BY AUTOMATED COUNT: 12.8 % (ref 11.6–15.4)
EST. AVERAGE GLUCOSE BLD GHB EST-MCNC: 140 MG/DL
GLOBULIN SER-MCNC: 2.5 G/DL (ref 1.5–4.5)
GLUCOSE SERPL-MCNC: 120 MG/DL (ref 70–99)
HBA1C MFR BLD: 6.5 % (ref 4.8–5.6)
HCT VFR BLD AUTO: 39 % (ref 37.5–51)
HDLC SERPL-MCNC: 39 MG/DL
HGB BLD-MCNC: 13 G/DL (ref 13–17.7)
IMM GRANULOCYTES # BLD: 0.1 X10E3/UL (ref 0–0.1)
IMM GRANULOCYTES NFR BLD: 1 %
LDLC SERPL CALC-MCNC: 103 MG/DL (ref 0–99)
LDLC/HDLC SERPL: 2.6 RATIO (ref 0–3.6)
LYMPHOCYTES # BLD AUTO: 2.1 X10E3/UL (ref 0.7–3.1)
LYMPHOCYTES NFR BLD AUTO: 28 %
MCH RBC QN AUTO: 29.3 PG (ref 26.6–33)
MCHC RBC AUTO-ENTMCNC: 33.3 G/DL (ref 31.5–35.7)
MCV RBC AUTO: 88 FL (ref 79–97)
MONOCYTES # BLD AUTO: 0.6 X10E3/UL (ref 0.1–0.9)
MONOCYTES NFR BLD AUTO: 9 %
NEUTROPHILS # BLD AUTO: 4.3 X10E3/UL (ref 1.4–7)
NEUTROPHILS NFR BLD AUTO: 58 %
PLATELET # BLD AUTO: 245 X10E3/UL (ref 150–450)
POTASSIUM SERPL-SCNC: 4.5 MMOL/L (ref 3.5–5.2)
PROT SERPL-MCNC: 6.8 G/DL (ref 6–8.5)
RBC # BLD AUTO: 4.43 X10E6/UL (ref 4.14–5.8)
SL AMB VLDL CHOLESTEROL CALC: 29 MG/DL (ref 5–40)
SODIUM SERPL-SCNC: 141 MMOL/L (ref 134–144)
TRIGL SERPL-MCNC: 165 MG/DL (ref 0–149)
TSH SERPL DL<=0.005 MIU/L-ACNC: 1.95 UIU/ML (ref 0.45–4.5)
WBC # BLD AUTO: 7.4 X10E3/UL (ref 3.4–10.8)

## 2023-06-27 ENCOUNTER — OFFICE VISIT (OUTPATIENT)
Dept: FAMILY MEDICINE CLINIC | Facility: HOSPITAL | Age: 63
End: 2023-06-27
Payer: COMMERCIAL

## 2023-06-27 VITALS
HEART RATE: 65 BPM | BODY MASS INDEX: 35.76 KG/M2 | SYSTOLIC BLOOD PRESSURE: 109 MMHG | OXYGEN SATURATION: 94 % | HEIGHT: 69 IN | TEMPERATURE: 97.9 F | WEIGHT: 241.4 LBS | DIASTOLIC BLOOD PRESSURE: 71 MMHG

## 2023-06-27 DIAGNOSIS — E11.65 TYPE 2 DIABETES MELLITUS WITH HYPERGLYCEMIA, WITHOUT LONG-TERM CURRENT USE OF INSULIN (HCC): Primary | ICD-10-CM

## 2023-06-27 DIAGNOSIS — E78.2 MIXED HYPERLIPIDEMIA: ICD-10-CM

## 2023-06-27 DIAGNOSIS — H91.8X2 OTHER HEARING LOSS OF LEFT EAR WITH UNRESTRICTED HEARING OF RIGHT EAR: ICD-10-CM

## 2023-06-27 DIAGNOSIS — I10 ESSENTIAL HYPERTENSION: ICD-10-CM

## 2023-06-27 DIAGNOSIS — R41.3 MEMORY CHANGE: ICD-10-CM

## 2023-06-27 DIAGNOSIS — E66.01 OBESITY, MORBID (HCC): ICD-10-CM

## 2023-06-27 PROCEDURE — 99215 OFFICE O/P EST HI 40 MIN: CPT | Performed by: FAMILY MEDICINE

## 2023-06-27 NOTE — PROGRESS NOTES
Name: Richard Navarrete      : 1960      MRN: [id number]  Encounter Provider: Conchis Pham MD  Encounter Date: 2023   Enc[facility]      1  Type 2 diabetes mellitus with hyperglycemia, without long-term current use of insulin (HCC)  -     HEMOGLOBIN A1C W/ EAG ESTIMATION; Future; Expected date: 10/02/2023  -     HEMOGLOBIN A1C W/ EAG ESTIMATION    2  Essential hypertension    3  BMI 35 0-35 9,adult    4  Mixed hyperlipidemia  -     Comprehensive metabolic panel; Future; Expected date: 10/02/2023  -     Lipid Panel with Direct LDL reflex; Future; Expected date: 10/02/2023  -     Comprehensive metabolic panel  -     Lipid Panel with Direct LDL reflex    5  Memory change  -     MRI brain w wo contrast; Future; Expected date: 2023    6  Obesity, morbid ([address] )    7  Other hearing loss of left ear with unrestricted hearing of right ear  -     Ambulatory Referral to Otolaryngology; Future           Subjective     4 month follow up  No recent illness or injury    Extended discussion about his labs  Actually quite good numbers with improvement in lipids and in A1c    Discussed his difficulty in getting testing strips covered by his insurance because it is not the preferred brand of meter    Medication list reviewed and revised    New concerns about his problem with short term memory, also noted by his partner  He did have some trouble staying with train of thought while talking with me  We discussed getting a scan of brain to evaluate for hardening of arteries vs occult CVA vs intracranial lesion    Having trouble with hearing AS, wonders if canal is blocked  Some popping when he wiggles finger in canal  Discussed need for ENT evaluation and hearing testing    Review of Systems   Constitutional: Negative for fatigue and unexpected weight change  HENT: Negative  Eyes: Negative for visual disturbance     Respiratory: Negative  Cardiovascular: Negative  Genitourinary: Negative  Musculoskeletal: Positive for arthralgias  Neurological: Negative  Psychiatric/Behavioral: Positive for decreased concentration and dysphoric mood  All other systems reviewed and are negative  Past Medical History:   Diagnosis Date   • Atrial premature complex     last assessed: 05/23/2012   • Colon polyp    • Depression 07/09/2012   • Diabetes mellitus ([facility] [address] )    • Diverticulitis    • GERD (gastroesophageal reflux disease)    • Herpes simplex infection     resolved: 07/26/2017   • Hyperlipidemia    • Hypertension    • Nicotine dependence     last assessed: 78/70/9098   • Umbilical hernia     last assessed: 07/30/2014     Past Surgical History:   Procedure Laterality Date   • BOWEL RESECTION     • COLON SIGMOID RESECTION LAPAROSCOPIC      onset: 07/22/2014;  partial-diverticulitis   • COLONOSCOPY      October 2022: Healthy-appearing colorectal anastomosis, internal hemorrhoids, otherwise normal colonoscopy  Previous colonoscopy with polyps  • UMBILICAL HERNIA REPAIR      onset: 07/22/2014   • UPPER GASTROINTESTINAL ENDOSCOPY      October 2022: Irregular Z-line, otherwise normal EGD  Biopsies negative for Herndon's, EOE, H  pylori       Family History   Problem Relation Age of Onset   • Depression Mother    • Heart attack Father         acute   • Depression Sister    • Depression Sister    • Colon cancer Neg Hx    • Colon polyps Neg Hx      Social History     Socioeconomic History   • Marital status: /Civil Union     Spouse name: None   • Number of children: None   • Years of education: None   • Highest education level: None   Occupational History   • Occupation: Full-time employment   Tobacco Use   • Smoking status: Former   • Smokeless tobacco: Never   • Tobacco comments:     History of current every day smoker   Vaping Use   • Vaping Use: Never used   Substance and Sexual Activity   • Alcohol use: Yes     Comment: Social/occasional    • Drug use: No   • Sexual activity: Yes     Partners: Female   Other Topics Concern   • None   Social History Narrative   • None     Social Determinants of Health     Financial Resource Strain: Not on file   Food Insecurity: Not on file   Transportation Needs: Not on file   Physical Activity: Not on file   Stress: Not on file   Social Connections: Not on file   Intimate Partner Violence: Not on file   Housing Stability: Not on file     Current Outpatient Medications on File Prior to Visit   Medication Sig   • amLODIPine-benazepril (LOTREL) 10-40 MG per capsule Take 1 capsule by mouth daily   • famotidine (PEPCID) 40 MG tablet Take 1 tablet (40 mg total) by mouth daily at bedtime   • glimepiride (AMARYL) 2 mg tablet Take 1 tablet (2 mg total) by mouth 2 (two) times a day   • glucose blood test strip Test once daily   • Lancets (onetouch ultrasoft) lancets Test once daily   • metFORMIN (GLUCOPHAGE) 500 mg tablet Take one tablet with breakfast and 2 tablets with evening meal    • metoprolol succinate (TOPROL-XL) 25 mg 24 hr tablet Take 1 tablet (25 mg total) by mouth daily   • omeprazole (PriLOSEC) 40 MG capsule Take 1 capsule (40 mg total) by mouth daily   • QUEtiapine (SEROquel) 100 mg tablet Take 1 1/2 tablets (total 150mg) by mouth daily at bedtime  • simvastatin (ZOCOR) 40 mg tablet TAKE 1 TABLET BY MOUTH EVERY DAY   • Blood Glucose Monitoring Suppl (OneTouch Verio) w/Device KIT Use in the morning   • Blood Glucose Monitoring Suppl (True Metrix Air Glucose Meter) w/Device KIT Use 2 (two) times a day   • glucose blood (OneTouch Verio) test strip Use 1 each in the morning   • TRUEplus Lancets 30G MISC Use 2 (two) times a day Use one lancet each time to test blood sugar two times per day  • [DISCONTINUED] polyethylene glycol (Golytely) 4000 mL solution Take 4,000 mL by mouth once for 1 dose Take 4000 mL by mouth once for 1 dose   Use as directed     No Known Allergies  Immunization History "  Administered Date(s) Administered   • COVID-19 PFIZER VACCINE 0 3 ML IM 03/09/2021, 03/30/2021, 10/04/2021   • COVID-19 Pfizer Vac BIVALENT Bi-sucrose 12 Yr+ IM (BOOSTER ONLY) 04/26/2023   • COVID-19 Pfizer vac (Bi-sucrose, gray cap) 12 yr+ IM 04/28/2022   • INFLUENZA 09/29/2017, 09/07/2018, 09/02/2019, 10/01/2021   • Influenza Quadrivalent Preservative Free 3 years and older IM 09/27/2015, 09/29/2017   • Influenza, injectable, quadrivalent, preservative free 0 5 mL 09/01/2020   • Pneumococcal Conjugate Vaccine 20-valent (Pcv20), Polysace 06/21/2022   • Pneumococcal Polysaccharide PPV23 12/07/2015   • Tdap 07/18/2014, 12/20/2016   • Zoster 12/07/2015   • Zoster Vaccine Recombinant 09/28/2020, 01/29/2021       Objective     /71 (BP Location: Left arm, Patient Position: Sitting, Cuff Size: Large)   Pulse 65   Temp 97 9 °F (36 6 °C) (Tympanic)   Ht 5' 9\" (1 753 m)   Wt 109 kg (241 lb 6 4 oz)   SpO2 94%   BMI 35 65 kg/m²     Physical Exam  Vitals and nursing note reviewed  Constitutional:       Appearance: He is obese  HENT:      Right Ear: Tympanic membrane, ear canal and external ear normal       Left Ear: Tympanic membrane, ear canal and external ear normal    Neck:      Vascular: No carotid bruit  Cardiovascular:      Rate and Rhythm: Normal rate and regular rhythm  Pulses: Normal pulses  Heart sounds: Normal heart sounds  Pulmonary:      Breath sounds: Normal breath sounds  Musculoskeletal:      Right lower leg: No edema  Left lower leg: No edema  Skin:     Findings: No rash  Neurological:      Mental Status: He is oriented to person, place, and time     Psychiatric:         Mood and Affect: Mood normal        Chaz Echevarria MD  "

## 2023-07-10 DIAGNOSIS — H05.50: Primary | ICD-10-CM

## 2023-07-10 DIAGNOSIS — E11.65 TYPE 2 DIABETES MELLITUS WITH HYPERGLYCEMIA, WITHOUT LONG-TERM CURRENT USE OF INSULIN (HCC): ICD-10-CM

## 2023-07-10 NOTE — TELEPHONE ENCOUNTER
MRI scheduled for 7/23 at 3:15. Needs pre MRI x-ray orbital.  Please call when script is ready for this.

## 2023-07-11 ENCOUNTER — TELEPHONE (OUTPATIENT)
Dept: FAMILY MEDICINE CLINIC | Facility: HOSPITAL | Age: 63
End: 2023-07-11

## 2023-07-11 RX ORDER — BLOOD SUGAR DIAGNOSTIC
1 STRIP MISCELLANEOUS DAILY
Qty: 100 STRIP | Refills: 3 | Status: SHIPPED | OUTPATIENT
Start: 2023-07-11

## 2023-08-10 DIAGNOSIS — E11.65 TYPE 2 DIABETES MELLITUS WITH HYPERGLYCEMIA, WITHOUT LONG-TERM CURRENT USE OF INSULIN (HCC): ICD-10-CM

## 2023-08-10 RX ORDER — BLOOD SUGAR DIAGNOSTIC
STRIP MISCELLANEOUS
Qty: 100 STRIP | Refills: 3 | Status: SHIPPED | OUTPATIENT
Start: 2023-08-10

## 2023-08-10 RX ORDER — L. ACIDOPHILUS/BIFIDO. LONGUM 15 MG
CAPSULE,DELAYED RELEASE (ENTERIC COATED) ORAL
Qty: 100 STRIP | Refills: 3 | Status: SHIPPED | OUTPATIENT
Start: 2023-08-10 | End: 2023-08-10

## 2023-10-12 ENCOUNTER — RA CDI HCC (OUTPATIENT)
Dept: OTHER | Facility: HOSPITAL | Age: 63
End: 2023-10-12

## 2023-10-12 NOTE — PROGRESS NOTES
[address] [facility] coding opportunities          Chart Reviewed number of suggestions sent to Provider: 2  E11.22  E11.36  E13.9     Patients Insurance     Medicare Insurance: [facility] Advantage

## 2023-10-24 ENCOUNTER — OFFICE VISIT (OUTPATIENT)
Dept: FAMILY MEDICINE CLINIC | Facility: HOSPITAL | Age: 63
End: 2023-10-24
Payer: COMMERCIAL

## 2023-10-24 VITALS
BODY MASS INDEX: 36.29 KG/M2 | DIASTOLIC BLOOD PRESSURE: 68 MMHG | HEART RATE: 73 BPM | HEIGHT: 69 IN | TEMPERATURE: 97.5 F | WEIGHT: 245 LBS | SYSTOLIC BLOOD PRESSURE: 124 MMHG | OXYGEN SATURATION: 97 %

## 2023-10-24 DIAGNOSIS — I10 ESSENTIAL HYPERTENSION: ICD-10-CM

## 2023-10-24 DIAGNOSIS — K21.9 GASTROESOPHAGEAL REFLUX DISEASE WITHOUT ESOPHAGITIS: ICD-10-CM

## 2023-10-24 DIAGNOSIS — E11.65 TYPE 2 DIABETES MELLITUS WITH HYPERGLYCEMIA, WITHOUT LONG-TERM CURRENT USE OF INSULIN (HCC): ICD-10-CM

## 2023-10-24 DIAGNOSIS — Z12.5 SCREENING FOR MALIGNANT NEOPLASM OF PROSTATE: Primary | ICD-10-CM

## 2023-10-24 DIAGNOSIS — E78.2 MIXED HYPERLIPIDEMIA: ICD-10-CM

## 2023-10-24 DIAGNOSIS — Z00.00 WELCOME TO MEDICARE PREVENTIVE VISIT: ICD-10-CM

## 2023-10-24 LAB
ALBUMIN SERPL-MCNC: 4.4 G/DL (ref 3.9–4.9)
ALBUMIN/GLOB SERPL: 1.7 {RATIO} (ref 1.2–2.2)
ALP SERPL-CCNC: 68 IU/L (ref 44–121)
ALT SERPL-CCNC: 18 IU/L (ref 0–44)
AST SERPL-CCNC: 18 IU/L (ref 0–40)
BILIRUB SERPL-MCNC: 0.5 MG/DL (ref 0–1.2)
BUN SERPL-MCNC: 28 MG/DL (ref 8–27)
BUN/CREAT SERPL: 20 (ref 10–24)
CALCIUM SERPL-MCNC: 9.3 MG/DL (ref 8.6–10.2)
CHLORIDE SERPL-SCNC: 103 MMOL/L (ref 96–106)
CHOLEST SERPL-MCNC: 167 MG/DL (ref 100–199)
CO2 SERPL-SCNC: 21 MMOL/L (ref 20–29)
CREAT SERPL-MCNC: 1.39 MG/DL (ref 0.76–1.27)
EGFR: 56 ML/MIN/1.73
EST. AVERAGE GLUCOSE BLD GHB EST-MCNC: 140 MG/DL
GLOBULIN SER-MCNC: 2.6 G/DL (ref 1.5–4.5)
GLUCOSE SERPL-MCNC: 128 MG/DL (ref 70–99)
HBA1C MFR BLD: 6.5 % (ref 4.8–5.6)
HDLC SERPL-MCNC: 41 MG/DL
LDLC SERPL CALC-MCNC: 94 MG/DL (ref 0–99)
LDLC/HDLC SERPL: 2.3 RATIO (ref 0–3.6)
POTASSIUM SERPL-SCNC: 4.4 MMOL/L (ref 3.5–5.2)
PROT SERPL-MCNC: 7 G/DL (ref 6–8.5)
SL AMB VLDL CHOLESTEROL CALC: 32 MG/DL (ref 5–40)
SODIUM SERPL-SCNC: 139 MMOL/L (ref 134–144)
TRIGL SERPL-MCNC: 184 MG/DL (ref 0–149)

## 2023-10-24 PROCEDURE — 3725F SCREEN DEPRESSION PERFORMED: CPT | Performed by: FAMILY MEDICINE

## 2023-10-24 PROCEDURE — 3074F SYST BP LT 130 MM HG: CPT | Performed by: FAMILY MEDICINE

## 2023-10-24 PROCEDURE — 3078F DIAST BP <80 MM HG: CPT | Performed by: FAMILY MEDICINE

## 2023-10-24 PROCEDURE — 99214 OFFICE O/P EST MOD 30 MIN: CPT | Performed by: FAMILY MEDICINE

## 2023-10-24 PROCEDURE — 3288F FALL RISK ASSESSMENT DOCD: CPT | Performed by: FAMILY MEDICINE

## 2023-10-24 PROCEDURE — G0402 INITIAL PREVENTIVE EXAM: HCPCS | Performed by: FAMILY MEDICINE

## 2023-10-24 NOTE — PATIENT INSTRUCTIONS
Medicare Preventive Visit Patient Instructions  Thank you for completing your Welcome to Medicare Visit or Medicare Annual Wellness Visit today. Your next wellness visit will be due in one year (10/29/2024). The screening/preventive services that you may require over the next 5-10 years are detailed below. Some tests may not apply to you based off risk factors and/or age. Screening tests ordered at today's visit but not completed yet may show as past due. Also, please note that scanned in results may not display below. Preventive Screenings:  Service Recommendations Previous Testing/Comments   Colorectal Cancer Screening  Colonoscopy    Fecal Occult Blood Test (FOBT)/Fecal Immunochemical Test (FIT)  Fecal DNA/Cologuard Test  Flexible Sigmoidoscopy Age: 43-73 years old   Colonoscopy: every 10 years (May be performed more frequently if at higher risk)  OR  FOBT/FIT: every 1 year  OR  Cologuard: every 3 years  OR  Sigmoidoscopy: every 5 years  Screening may be recommended earlier than age 39 if at higher risk for colorectal cancer. Also, an individualized decision between you and your healthcare provider will decide whether screening between the ages of 77-80 would be appropriate.  Colonoscopy: 10/04/2022  FOBT/FIT: Not on file  Cologuard: Not on file  Sigmoidoscopy: Not on file    Screening Current     Prostate Cancer Screening Individualized decision between patient and health care provider in men between ages of 53-66   Medicare will cover every 12 months beginning on the day after your 50th birthday PSA: 2.6 ng/mL     Screening Current     Hepatitis C Screening Once for adults born between 1945 and 1965  More frequently in patients at high risk for Hepatitis C Hep C Antibody: 09/12/2018    Screening Current   Diabetes Screening 1-2 times per year if you're at risk for diabetes or have pre-diabetes Fasting glucose: No results in last 5 years (No results in last 5 years)  A1C: 6.5 % (10/23/2023)  Screening Not Indicated  History Diabetes   Cholesterol Screening Once every 5 years if you don't have a lipid disorder. May order more often based on risk factors. Lipid panel: 10/23/2023  Screening Not Indicated  History Lipid Disorder      Other Preventive Screenings Covered by Medicare:  Abdominal Aortic Aneurysm (AAA) Screening: covered once if your at risk. You're considered to be at risk if you have a family history of AAA or a male between the age of 70-76 who smoking at least 100 cigarettes in your lifetime. Lung Cancer Screening: covers low dose CT scan once per year if you meet all of the following conditions: (1) Age 48-67; (2) No signs or symptoms of lung cancer; (3) Current smoker or have quit smoking within the last 15 years; (4) You have a tobacco smoking history of at least 20 pack years (packs per day x number of years you smoked); (5) You get a written order from a healthcare provider. Glaucoma Screening: covered annually if you're considered high risk: (1) You have diabetes OR (2) Family history of glaucoma OR (3)  aged 48 and older OR (3)  American aged 72 and older  Osteoporosis Screening: covered every 2 years if you meet one of the following conditions: (1) Have a vertebral abnormality; (2) On glucocorticoid therapy for more than 3 months; (3) Have primary hyperparathyroidism; (4) On osteoporosis medications and need to assess response to drug therapy. HIV Screening: covered annually if you're between the age of 14-79. Also covered annually if you are younger than 13 and older than 72 with risk factors for HIV infection. For pregnant patients, it is covered up to 3 times per pregnancy.     Immunizations:  Immunization Recommendations   Influenza Vaccine Annual influenza vaccination during flu season is recommended for all persons aged >= 6 months who do not have contraindications   Pneumococcal Vaccine   * Pneumococcal conjugate vaccine = PCV13 (Prevnar 13), PCV15 (Vaxneuvance), PCV20 (Prevnar 20)  * Pneumococcal polysaccharide vaccine = PPSV23 (Pneumovax) Adults 29-33 yo with certain risk factors or if 69+ yo  If never received any pneumonia vaccine: recommend Prevnar 20 (PCV20)  Give PCV20 if previously received 1 dose of PCV13 or PPSV23   Hepatitis B Vaccine 3 dose series if at intermediate or high risk (ex: diabetes, end stage renal disease, liver disease)   Respiratory syncytial virus (RSV) Vaccine - COVERED BY MEDICARE PART D  * RSVPreF3 (Arexvy) CDC recommends that adults 61years of age and older may receive a single dose of RSV vaccine using shared clinical decision-making (SCDM)   Tetanus (Td) Vaccine - COST NOT COVERED BY MEDICARE PART B Following completion of primary series, a booster dose should be given every 10 years to maintain immunity against tetanus. Td may also be given as tetanus wound prophylaxis. Tdap Vaccine - COST NOT COVERED BY MEDICARE PART B Recommended at least once for all adults. For pregnant patients, recommended with each pregnancy. Shingles Vaccine (Shingrix) - COST NOT COVERED BY MEDICARE PART B  2 shot series recommended in those 19 years and older who have or will have weakened immune systems or those 50 years and older     Health Maintenance Due:      Topic Date Due   • Colorectal Cancer Screening  10/03/2027   • HIV Screening  Completed   • Hepatitis C Screening  Completed     Immunizations Due:  There are no preventive care reminders to display for this patient. Advance Directives   What are advance directives? Advance directives are legal documents that state your wishes and plans for medical care. These plans are made ahead of time in case you lose your ability to make decisions for yourself. Advance directives can apply to any medical decision, such as the treatments you want, and if you want to donate organs. What are the types of advance directives?   There are many types of advance directives, and each state has rules about how to use them. You may choose a combination of any of the following:  Living will: This is a written record of the treatment you want. You can also choose which treatments you do not want, which to limit, and which to stop at a certain time. This includes surgery, medicine, IV fluid, and tube feedings. Durable power of  for healthcare [facility]): This is a written record that states who you want to make healthcare choices for you when you are unable to make them for yourself. This person, called a proxy, is usually a family member or a friend. You may choose more than 1 proxy. Do not resuscitate (DNR) order:  A DNR order is used in case your heart stops beating or you stop breathing. It is a request not to have certain forms of treatment, such as CPR. A DNR order may be included in other types of advance directives. Medical directive: This covers the care that you want if you are in a coma, near death, or unable to make decisions for yourself. You can list the treatments you want for each condition. Treatment may include pain medicine, surgery, blood transfusions, dialysis, IV or tube feedings, and a ventilator (breathing machine). Values history: This document has questions about your views, beliefs, and how you feel and think about life. This information can help others choose the care that you would choose. Why are advance directives important? An advance directive helps you control your care. Although spoken wishes may be used, it is better to have your wishes written down. Spoken wishes can be misunderstood, or not followed. Treatments may be given even if you do not want them. An advance directive may make it easier for your family to make difficult choices about your care. Fall Prevention    Fall prevention  includes ways to make your home and other areas safer. It also includes ways you can move more carefully to prevent a fall.  Health conditions that cause changes in your blood pressure, vision, or muscle strength and coordination may increase your risk for falls. Medicines may also increase your risk for falls if they make you dizzy, weak, or sleepy. Fall prevention tips:   Stand or sit up slowly. Use assistive devices as directed. Wear shoes that fit well and have soles that . Wear a personal alarm. Stay active. Manage your medical conditions. Home Safety Tips:  Add items to prevent falls in the bathroom. Keep paths clear. Install bright lights in your home. Keep items you use often on shelves within reach. Paint or place reflective tape on the edges of your stairs. Weight Management   Why it is important to manage your weight:  Being overweight increases your risk of health conditions such as heart disease, high blood pressure, type 2 diabetes, and certain types of cancer. It can also increase your risk for osteoarthritis, sleep apnea, and other respiratory problems. Aim for a slow, steady weight loss. Even a small amount of weight loss can lower your risk of health problems. How to lose weight safely:  A safe and healthy way to lose weight is to eat fewer calories and get regular exercise. You can lose up about 1 pound a week by decreasing the number of calories you eat by 500 calories each day. Healthy meal plan for weight management:  A healthy meal plan includes a variety of foods, contains fewer calories, and helps you stay healthy. A healthy meal plan includes the following:  Eat whole-grain foods more often. A healthy meal plan should contain fiber. Fiber is the part of grains, fruits, and vegetables that is not broken down by your body. Whole-grain foods are healthy and provide extra fiber in your diet. Some examples of whole-grain foods are whole-wheat breads and pastas, oatmeal, brown rice, and bulgur. Eat a variety of vegetables every day. Include dark, leafy greens such as spinach, kale, manisha greens, and mustard greens.  Eat yellow and orange vegetables such as carrots, sweet potatoes, and winter squash. Eat a variety of fruits every day. Choose fresh or canned fruit (canned in its own juice or light syrup) instead of juice. Fruit juice has very little or no fiber. Eat low-fat dairy foods. Drink fat-free (skim) milk or 1% milk. Eat fat-free yogurt and low-fat cottage cheese. Try low-fat cheeses such as mozzarella and other reduced-fat cheeses. Choose meat and other protein foods that are low in fat. Choose beans or other legumes such as split peas or lentils. Choose fish, skinless poultry (chicken or turkey), or lean cuts of red meat (beef or pork). Before you cook meat or poultry, cut off any visible fat. Use less fat and oil. Try baking foods instead of frying them. Add less fat, such as margarine, sour cream, regular salad dressing and mayonnaise to foods. Eat fewer high-fat foods. Some examples of high-fat foods include french fries, doughnuts, ice cream, and cakes. Eat fewer sweets. Limit foods and drinks that are high in sugar. This includes candy, cookies, regular soda, and sweetened drinks. Exercise:  Exercise at least 30 minutes per day on most days of the week. Some examples of exercise include walking, biking, dancing, and swimming. You can also fit in more physical activity by taking the stairs instead of the elevator or parking farther away from stores. Ask your healthcare provider about the best exercise plan for you. © Copyright [employer] 2018 Information is for End User's use only and may not be sold, redistributed or otherwise used for commercial purposes. All illustrations and images included in CareNotes® are the copyrighted property of [employer] or [address] [place] [address]    Basic Carbohydrate Counting   AMBULATORY CARE:   Carbohydrate counting  is a way to plan your meals by counting the amount of carbohydrate in foods.  Carbohydrates are the sugars, starches, and fiber found in fruit, grains, vegetables, and milk products. Carbohydrates increase your blood sugar levels. Carbohydrate counting can help you eat the right amount of carbohydrate to keep your blood sugar levels under control. What you need to know about planning meals using carbohydrate counting:  A dietitian or healthcare provider will help you develop a healthy meal plan that works best for you. You will be taught how much carbohydrate to eat or drink for each meal and snack. Your meal plan will be based on your age, weight, usual food intake, and physical activity level. If you have diabetes, it will also include your blood sugar levels and diabetes medicine. Once you know how much carbohydrate you should eat, you can decide what type of food you want to eat. You will need to know what foods contain carbohydrate and how much they contain. Keep track of the amount of carbohydrate in meals and snacks in order to follow your meal plan. Do not avoid carbohydrates or skip meals. Your blood sugar may fall too low if you do not eat enough carbohydrate or you skip meals. Foods that contain carbohydrate:   Breads:  Each serving of food listed below contains about 15 g of carbohydrate . 1 slice of bread (1 ounce) or 1 flour or corn tortilla (6 inch)    ½ of a hamburger bun or ¼ of a large bagel (about 1 ounce)    1 pancake (about 4 inches across and ¼ inch thick)    Cereals and grains:  Serving sizes of ready-to-eat cereals vary. Look at the serving size and the total carbohydrate amount listed on the food label. Each serving of food listed below contains about 15 g of carbohydrate . ¾ cup of dry, unsweetened, ready-to-eat cereal or ¼ cup of low-fat granola     ½ cup of oatmeal or other cooked cereal     ? cup of cooked rice or pasta    Starchy vegetables and beans:  Each serving of food listed below contains about 15 g of carbohydrate .     ½ cup of corn, green peas, sweet potatoes, or mashed potatoes    ¼ of a large baked potato    ½ cup of beans, lentils, and peas (garbanzo, sykes, kidney, white, split, black-eyed)    Crackers and snacks:  Each serving of food listed below contains about 15 g of carbohydrate . 3 chilo cracker squares or 8 animal crackers     6 saltine-type crackers    3 cups of popcorn or ¾ ounce of pretzels, potato chips, or tortilla chips    Fruit:  Each serving of food listed below contains about 15 g of carbohydrate . 1 small (4 ounce) piece of fresh fruit or ¾ to 1 cup of fresh fruit    ½ cup of canned or frozen fruit, packed in natural juice    ½ cup (4 ounces) of unsweetened fruit juice    2 tablespoons of dried fruit    Desserts or sugary foods:  Each serving of food listed below contains about 15 g of carbohydrate . 2-inch square unfrosted cake or brownie     2 small cookies    ½ cup of ice cream, frozen yogurt, or nondairy frozen yogurt    ¼ cup of sherbet or sorbet    1 tablespoon of regular syrup, jam, or jelly    2 tablespoons of light syrup    Milk and yogurt:  Foods from the milk group contain about 12 g of carbohydrate per serving. 1 cup of fat-free or low-fat milk    1 cup of soy milk    ? cup of fat-free, yogurt sweetened with artificial sweetener    Non-starchy vegetables:  Each serving contains about 5 g of carbohydrate . Three servings of non-starch vegetables count as 1 carbohydrate serving. ½ cup of cooked vegetables or 1 cup of raw vegetables. This includes beets, broccoli, cabbage, cauliflower, cucumber, mushrooms, tomatoes, and zucchini    ½ cup of vegetable juice    How to use carbohydrate counting to plan meals:   Count carbohydrate amounts using serving sizes:      Pasta dinner example: You plan to have pasta, tossed salad, and an 8-ounce glass of milk. Your healthcare provider tells you that you may have 4 carbohydrate servings for dinner. One carbohydrate serving of pasta is ? cup. One cup of pasta will equal 3 carbohydrate servings.  An 8-ounce glass of milk will count as 1 carbohydrate serving. These amounts of food would equal 4 carbohydrate servings. One cup of tossed salad does not count toward your carbohydrate servings. Count carbohydrate amounts using food labels:  Find the total amount of carbohydrate in a packaged food by reading the food label. Food labels tell you the serving size of the food and the total carbohydrate amount in each serving. Find the serving size on the food label and then decide how many servings you will eat. Multiply the number of servings you plan to eat by the carbohydrate amount per serving. Granola bar snack example: Your meal plan allows you to have 2 carbohydrate servings (30 grams) of carbohydrate for a snack. You plan to eat 1 package of granola bars, which contains 2 bars. According to the food label, the serving size of food in this package is 1 bar. Each serving (1 bar) contains 25 grams of carbohydrate. The total amount of carbohydrate in this package of granola bars would be 50 g. Based on your meal plan, you should eat only 1 bar. Follow up with your doctor as directed:  Write down your questions so you remember to ask them during your visits. © Copyright [employer] 2023 Information is for End User's use only and may not be sold, redistributed or otherwise used for commercial purposes. The above information is an  only. It is not intended as medical advice for individual conditions or treatments. Talk to your doctor, nurse or pharmacist before following any medical regimen to see if it is safe and effective for you. What to Do if Your Blood Sugar is Low   AMBULATORY CARE:   Low blood sugar levels  (hypoglycemia) can happen with Type 1 and Type 2 diabetes. Low levels are more likely to happen if you use insulin. Hypoglycemia can cause you to have falls, accidents, and injuries. A blood sugar level that gets too low can lead to seizures, coma, and death.  Learn to recognize the symptoms early so you can get treatment quickly. When your blood sugar is low you may feel:  Sweaty    Nervous or shaky    Anxious or irritable    Confused    A fast, pounding heartbeat    Extremely hungry    Have someone call your local emergency number (911 in the [address]) if:   You cannot be woken. You have a seizure. Call your doctor if:   You have symptoms of a low blood sugar level, such as trouble thinking, sweating, or a pounding heartbeat. Your blood sugar level is lower than normal and it does not improve with treatment. You often have lower blood sugar levels than your target goals. You have trouble coping with your illness, or you feel anxious or depressed. You have questions or concerns about your condition or care. What to do if you have symptoms of low blood sugar:   Check your blood sugar level, if possible. Your blood sugar level is too low if it is at or below 70 mg/dL. Eat or drink 15 grams of fast-acting carbohydrate. Fast-acting carbohydrates will raise your blood sugar level quickly. Examples of 15 grams of fast-acting carbohydrates:     4 ounces (½ cup) of fruit juice     4 ounces of regular soda    2 tablespoons of raisins     1 tube of glucose gel or 3 to 4 glucose tablets       Check your blood sugar level 15 minutes later. If the level is still low (less than 100 mg/dL), eat another 15 grams of carbohydrate. When the level returns to 100 mg/dL, eat a snack or meal that contains carbohydrates. This will help prevent another drop in blood sugar. Teach people close to you how to use your glucagon kit. Your blood sugar may be too low for you to be awake. People need to know when and how to use your kit. Prevent low blood sugar levels:  Prevent low blood sugar by knowing what increases your risk. Ask your healthcare provider for ways to prevent low blood sugar levels.  Any of the following can increase your risk of low blood sugar:  Fasting for tests or procedures    During or after intense exercise    Late or postponed meals    Sleeping (you may need a bedtime snack)     Drinking alcohol if you use insulin or insulin releasing pills    Follow up with your doctor as directed:  Write down your questions so you remember to ask them during your visits. © Copyright Yisselletta Gosselin 2023 Information is for End User's use only and may not be sold, redistributed or otherwise used for commercial purposes. The above information is an  only. It is not intended as medical advice for individual conditions or treatments. Talk to your doctor, nurse or pharmacist before following any medical regimen to see if it is safe and effective for you.

## 2023-10-24 NOTE — PROGRESS NOTES
Assessment and Plan:     Problem List Items Addressed This Visit          Digestive    Gastroesophageal reflux disease without esophagitis     Stable symptom control on Omeprazole            Endocrine    Type 2 diabetes mellitus with hyperglycemia, without long-term current use of insulin (HCC)       Lab Results   Component Value Date    HGBA1C 6.5 (H) 10/23/2023          Relevant Orders    Comprehensive metabolic panel    HEMOGLOBIN A1C W/ EAG ESTIMATION       Cardiovascular and Mediastinum    Essential hypertension     Very good BP control            Other    Mixed hyperlipidemia    Relevant Orders    Lipid Panel with Direct LDL reflex    Welcome to Medicare preventive visit     Other Visit Diagnoses       Screening for malignant neoplasm of prostate    -  Primary    Relevant Orders    PSA Total (Reflex To Free)             Preventive health issues were discussed with patient, and age appropriate screening tests were ordered as noted in patient's After Visit Summary. Personalized health advice and appropriate referrals for health education or preventive services given if needed, as noted in patient's After Visit Summary. History of Present Illness:     Patient presents for a Medicare Wellness Visit    4 month follow up    No recent injury or illness    Meds reviewed and list revised    Labs reviewed in detail and copy given to him  Metabolic measures under good control       Patient Care Team:  Webb Koyanagi, MD as PCP - MD Bisi Gerard MD     Review of Systems:     Review of Systems   Constitutional: Negative. Negative for unexpected weight change. Respiratory: Negative. Cardiovascular: Negative. Gastrointestinal: Negative. Musculoskeletal: Negative. Psychiatric/Behavioral: Negative. All other systems reviewed and are negative.        Problem List:     Patient Active Problem List   Diagnosis    BPH with obstruction/lower urinary tract symptoms    Chronic pain of right knee    Depression    Type 2 diabetes mellitus with hyperglycemia, without long-term current use of insulin (HCC)    Diverticulosis of large intestine without perforation or abscess without bleeding    Esophagitis, reflux    Mixed hyperlipidemia    Essential hypertension    BMI 36.0-36.9,adult    Obstructive sleep apnea    Heart palpitations    Persistent insomnia    Myalgia due to statin    Bradycardia    Symptomatic PVCs    Rectal bleeding    History of colon polyps    Gastroesophageal reflux disease without esophagitis    Diverticulitis    Diverticular disease of colon    Welcome to Medicare preventive visit    Primary osteoarthritis of right knee    Serous otitis media with rupture of tympanic membrane, left    Insomnia secondary to anxiety    Tinnitus of both ears    Fatty liver    Renal cysts and diabetes syndrome ([address])    Tongue lesion    Personal history of nicotine dependence    Stage 3a chronic kidney disease ([address])    Bilateral renal cysts    Obesity, morbid ([address])      Past Medical and Surgical History:     Past Medical History:   Diagnosis Date    Atrial premature complex     last assessed: 05/23/2012    Colon polyp     Depression 07/09/2012    Diabetes mellitus ([address])     Diverticulitis     GERD (gastroesophageal reflux disease)     Herpes simplex infection     resolved: 07/26/2017    Hyperlipidemia     Hypertension     Nicotine dependence     last assessed: 40/62/9700    Umbilical hernia     last assessed: 07/30/2014     Past Surgical History:   Procedure Laterality Date    BOWEL RESECTION      COLON SIGMOID RESECTION LAPAROSCOPIC      onset: 07/22/2014;  partial-diverticulitis    COLONOSCOPY      October 2022: Healthy-appearing colorectal anastomosis, internal hemorrhoids, otherwise normal colonoscopy. Previous colonoscopy with polyps. UMBILICAL HERNIA REPAIR      onset: 07/22/2014    UPPER GASTROINTESTINAL ENDOSCOPY      October 2022: Irregular Z-line, otherwise normal EGD.   Biopsies negative for Herndon's, EOE, H. pylori. Family History:     Family History   Problem Relation Age of Onset    Depression Mother     Heart attack Father         acute    Depression Sister     Depression Sister     Colon cancer Neg Hx     Colon polyps Neg Hx       Social History:     Social History     Socioeconomic History    Marital status: /Civil Union     Spouse name: None    Number of children: None    Years of education: None    Highest education level: None   Occupational History    Occupation: Full-time employment   Tobacco Use    Smoking status: Former    Smokeless tobacco: Never    Tobacco comments:     History of current every day smoker   Vaping Use    Vaping Use: Never used   Substance and Sexual Activity    Alcohol use: Yes     Comment: Social/occasional     Drug use: No    Sexual activity: Yes     Partners: Female   Other Topics Concern    None   Social History Narrative    None     Social Determinants of Health     Financial Resource Strain: Low Risk  (10/24/2023)    Overall Financial Resource Strain (CARDIA)     Difficulty of Paying Living Expenses: Not very hard   Food Insecurity: Not on file   Transportation Needs: No Transportation Needs (10/24/2023)    PRAPARE - Transportation     Lack of Transportation (Medical): No     Lack of Transportation (Non-Medical):  No   Physical Activity: Not on file   Stress: Not on file   Social Connections: Not on file   Intimate Partner Violence: Not on file   Housing Stability: Not on file      Medications and Allergies:     Current Outpatient Medications   Medication Sig Dispense Refill    amLODIPine-benazepril (LOTREL) 10-40 MG per capsule Take 1 capsule by mouth daily 90 capsule 2    Blood Glucose Monitoring Suppl (OneTouch Verio) w/Device KIT Use in the morning 1 kit 0    Blood Glucose Monitoring Suppl (True Metrix Air Glucose Meter) w/Device KIT Use 2 (two) times a day 1 kit 0    famotidine (PEPCID) 40 MG tablet Take 1 tablet (40 mg total) by mouth daily at bedtime 30 tablet 3    glimepiride (AMARYL) 2 mg tablet Take 1 tablet (2 mg total) by mouth 2 (two) times a day 180 tablet 1    glucose blood (OneTouch Verio) test strip Use 1 each in the morning 100 strip 3    glucose blood (OneTouch Verio) test strip USE TO TEST ONCE DAILY E11.65 100 strip 3    Lancets (onetouch ultrasoft) lancets Test once daily 100 each 3    metFORMIN (GLUCOPHAGE) 500 mg tablet Take one tablet with breakfast and 2 tablets with evening meal. 270 tablet 2    metoprolol succinate (TOPROL-XL) 25 mg 24 hr tablet Take 1 tablet (25 mg total) by mouth daily 90 tablet 2    omeprazole (PriLOSEC) 40 MG capsule Take 1 capsule (40 mg total) by mouth daily 90 capsule 2    QUEtiapine (SEROquel) 100 mg tablet Take 1 1/2 tablets (total 150mg) by mouth daily at bedtime. 135 tablet 2    simvastatin (ZOCOR) 40 mg tablet TAKE 1 TABLET BY MOUTH EVERY DAY 90 tablet 2    TRUEplus Lancets 30G MISC Use 2 (two) times a day Use one lancet each time to test blood sugar two times per day. 200 each 1     No current facility-administered medications for this visit.      No Known Allergies   Immunizations:     Immunization History   Administered Date(s) Administered    COVID-19 PFIZER VACCINE 0.3 ML IM 03/09/2021, 03/30/2021, 10/04/2021    COVID-19 Pfizer Vac BIVALENT Bi-sucrose 12 Yr+ IM 09/18/2022, 04/26/2023    COVID-19 Pfizer mRNA vacc PF bi-sucrose 12 yr and older (Comirnaty) 09/21/2023    COVID-19 Pfizer vac (Bi-sucrose, gray cap) 12 yr+ IM 04/28/2022    INFLUENZA 09/29/2017, 09/07/2018, 09/02/2019, 10/01/2021, 09/18/2022, 09/14/2023    Influenza Quadrivalent Preservative Free 3 years and older IM 09/27/2015, 09/29/2017    Influenza, injectable, quadrivalent, preservative free 0.5 mL 09/01/2020    Pneumococcal Conjugate Vaccine 20-valent (Pcv20), Polysace 06/21/2022    Pneumococcal Polysaccharide PPV23 12/07/2015    Tdap 07/18/2014, 12/20/2016    Zoster 12/07/2015    Zoster Vaccine Recombinant 09/28/2020, 01/29/2021      Health Maintenance:         Topic Date Due    Colorectal Cancer Screening  10/03/2027    HIV Screening  Completed    Hepatitis C Screening  Completed     There are no preventive care reminders to display for this patient. Medicare Screening Tests and Risk Assessments:     Tino Arreguin is here for his Welcome to Medicare visit. Health Risk Assessment:   Patient rates overall health as good. Patient feels that their physical health rating is slightly worse. Patient is very satisfied with their life. Eyesight was rated as same. Hearing was rated as slightly worse. Patient feels that their emotional and mental health rating is same. Patients states they are sometimes angry. Patient states they are sometimes unusually tired/fatigued. Pain experienced in the last 7 days has been some. Patient's pain rating has been 3/10. Patient states that he has experienced no weight loss or gain in last 6 months. Depression Screening:   PHQ-9 Score: 3      Fall Risk Screening: In the past year, patient has experienced: history of falling in past year    Number of falls: 1  Injured during fall?: No    Feels unsteady when standing or walking?: No    Worried about falling?: No      Home Safety:  Patient does not have trouble with stairs inside or outside of their home. Patient has working smoke alarms and has working carbon monoxide detector. Home safety hazards include: none. Nutrition:   Current diet is Regular. Medications:   Patient is not currently taking any over-the-counter supplements. Patient is able to manage medications. Activities of Daily Living (ADLs)/Instrumental Activities of Daily Living (IADLs):   Walk and transfer into and out of bed and chair?: Yes  Dress and groom yourself?: Yes    Bathe or shower yourself?: Yes    Feed yourself?  Yes  Do your laundry/housekeeping?: Yes  Manage your money, pay your bills and track your expenses?: Yes  Make your own meals?: Yes    Do your own shopping?: Yes    Previous Hospitalizations:   Any hospitalizations or ED visits within the last 12 months?: No      Advance Care Planning:   Living will: Yes    Durable POA for healthcare: Yes    Advanced directive: Yes    Advanced directive counseling given: Yes    Five wishes given: No    Patient declined ACP directive: No    End of Life Decisions reviewed with patient: Yes    Provider agrees with end of life decisions: Yes      Cognitive Screening:   Provider or family/friend/caregiver concerned regarding cognition?: No    PREVENTIVE SCREENINGS      Cardiovascular Screening:    General: Screening Not Indicated and History Lipid Disorder      Diabetes Screening:     General: Screening Not Indicated and History Diabetes      Colorectal Cancer Screening:     General: Screening Current      Prostate Cancer Screening:    General: Screening Current      Osteoporosis Screening:    General: Screening Not Indicated      Abdominal Aortic Aneurysm (AAA) Screening:    Risk factors include: age between 70-75 yo and tobacco use        Lung Cancer Screening:     General: Screening Not Indicated      Hepatitis C Screening:    General: Screening Current    Screening, Brief Intervention, and Referral to Treatment (SBIRT)    Screening  Typical number of drinks in a day: 1  Typical number of drinks in a week: 7  Interpretation: Low risk drinking behavior. Single Item Drug Screening:  How often have you used an illegal drug (including marijuana) or a prescription medication for non-medical reasons in the past year? never    Single Item Drug Screen Score: 0  Interpretation: Negative screen for possible drug use disorder    Brief Intervention  Alcohol & drug use screenings were reviewed. No concerns regarding substance use disorder identified. No results found.      Physical Exam:     /68 (BP Location: Left arm, Patient Position: Sitting, Cuff Size: Large)   Pulse 73   Temp 97.5 °F (36.4 °C) (Tympanic)   Ht 5' 9" (1.753 m)   Wt 111 kg (245 lb)   SpO2 97%   BMI 36.18 kg/m²     Physical Exam  Vitals and nursing note reviewed. Constitutional:       Appearance: Normal appearance. Neck:      Vascular: No carotid bruit. Cardiovascular:      Rate and Rhythm: Regular rhythm. Pulses: Normal pulses. Heart sounds: Normal heart sounds. Pulmonary:      Effort: Pulmonary effort is normal.      Breath sounds: Normal breath sounds. Musculoskeletal:      Right lower leg: No edema. Left lower leg: No edema. Neurological:      General: No focal deficit present. Mental Status: He is alert and oriented to person, place, and time.    Psychiatric:         Mood and Affect: Mood normal.          Sandra Ferreira MD

## 2023-11-21 DIAGNOSIS — I10 ESSENTIAL HYPERTENSION: ICD-10-CM

## 2023-11-21 DIAGNOSIS — E11.65 TYPE 2 DIABETES MELLITUS WITH HYPERGLYCEMIA, WITHOUT LONG-TERM CURRENT USE OF INSULIN (HCC): ICD-10-CM

## 2023-11-21 DIAGNOSIS — F41.9 INSOMNIA SECONDARY TO ANXIETY: ICD-10-CM

## 2023-11-21 DIAGNOSIS — F51.05 INSOMNIA SECONDARY TO ANXIETY: ICD-10-CM

## 2023-11-21 DIAGNOSIS — K21.00 GASTROESOPHAGEAL REFLUX DISEASE WITH ESOPHAGITIS: ICD-10-CM

## 2023-11-21 RX ORDER — AMLODIPINE AND BENAZEPRIL HYDROCHLORIDE 10; 40 MG/1; MG/1
1 CAPSULE ORAL DAILY
Qty: 90 CAPSULE | Refills: 1 | Status: SHIPPED | OUTPATIENT
Start: 2023-11-21

## 2023-11-21 RX ORDER — METOPROLOL SUCCINATE 25 MG/1
25 TABLET, EXTENDED RELEASE ORAL DAILY
Qty: 90 TABLET | Refills: 1 | Status: SHIPPED | OUTPATIENT
Start: 2023-11-21

## 2023-11-21 RX ORDER — OMEPRAZOLE 40 MG/1
40 CAPSULE, DELAYED RELEASE ORAL DAILY
Qty: 90 CAPSULE | Refills: 1 | Status: SHIPPED | OUTPATIENT
Start: 2023-11-21

## 2023-11-21 RX ORDER — QUETIAPINE FUMARATE 100 MG/1
TABLET, FILM COATED ORAL
Qty: 135 TABLET | Refills: 1 | Status: SHIPPED | OUTPATIENT
Start: 2023-11-21

## 2023-11-21 RX ORDER — GLIMEPIRIDE 2 MG/1
2 TABLET ORAL 2 TIMES DAILY
Qty: 180 TABLET | Refills: 1 | Status: SHIPPED | OUTPATIENT
Start: 2023-11-21

## 2023-11-29 ENCOUNTER — TELEPHONE (OUTPATIENT)
Dept: FAMILY MEDICINE CLINIC | Facility: HOSPITAL | Age: 63
End: 2023-11-29

## 2023-11-29 NOTE — TELEPHONE ENCOUNTER
Patient is due for micro-albumin/creat urine in February. Can you add to his existing labs that he will have done prior to his 2/27 appt?

## 2023-12-03 DIAGNOSIS — E11.65 TYPE 2 DIABETES MELLITUS WITH HYPERGLYCEMIA, WITHOUT LONG-TERM CURRENT USE OF INSULIN (HCC): Primary | ICD-10-CM

## 2023-12-23 PROBLEM — Z00.00 WELCOME TO MEDICARE PREVENTIVE VISIT: Status: RESOLVED | Noted: 2018-09-28 | Resolved: 2023-12-23

## 2024-01-02 DIAGNOSIS — E11.65 TYPE 2 DIABETES MELLITUS WITH HYPERGLYCEMIA, WITHOUT LONG-TERM CURRENT USE OF INSULIN (HCC): ICD-10-CM

## 2024-02-23 LAB
ALBUMIN SERPL-MCNC: 4.3 G/DL (ref 3.9–4.9)
ALBUMIN/CREAT UR: 47 MG/G CREAT (ref 0–29)
ALBUMIN/GLOB SERPL: 1.7 {RATIO} (ref 1.2–2.2)
ALP SERPL-CCNC: 72 IU/L (ref 44–121)
ALT SERPL-CCNC: 20 IU/L (ref 0–44)
AST SERPL-CCNC: 17 IU/L (ref 0–40)
BILIRUB SERPL-MCNC: 0.3 MG/DL (ref 0–1.2)
BUN SERPL-MCNC: 20 MG/DL (ref 8–27)
BUN/CREAT SERPL: 14 (ref 10–24)
CALCIUM SERPL-MCNC: 9.3 MG/DL (ref 8.6–10.2)
CHLORIDE SERPL-SCNC: 103 MMOL/L (ref 96–106)
CHOLEST SERPL-MCNC: 157 MG/DL (ref 100–199)
CO2 SERPL-SCNC: 21 MMOL/L (ref 20–29)
CREAT SERPL-MCNC: 1.42 MG/DL (ref 0.76–1.27)
CREAT UR-MCNC: 121.4 MG/DL
EGFR: 55 ML/MIN/1.73
EST. AVERAGE GLUCOSE BLD GHB EST-MCNC: 137 MG/DL
GLOBULIN SER-MCNC: 2.5 G/DL (ref 1.5–4.5)
GLUCOSE SERPL-MCNC: 133 MG/DL (ref 70–99)
HBA1C MFR BLD: 6.4 % (ref 4.8–5.6)
HDLC SERPL-MCNC: 37 MG/DL
LDLC SERPL CALC-MCNC: 92 MG/DL (ref 0–99)
LDLC/HDLC SERPL: 2.5 RATIO (ref 0–3.6)
MICROALBUMIN UR-MCNC: 57.4 UG/ML
POTASSIUM SERPL-SCNC: 4.9 MMOL/L (ref 3.5–5.2)
PROT SERPL-MCNC: 6.8 G/DL (ref 6–8.5)
PSA SERPL-MCNC: 2.8 NG/ML (ref 0–4)
SL AMB REFLEX CRITERIA: NORMAL
SL AMB VLDL CHOLESTEROL CALC: 28 MG/DL (ref 5–40)
SODIUM SERPL-SCNC: 141 MMOL/L (ref 134–144)
TRIGL SERPL-MCNC: 162 MG/DL (ref 0–149)

## 2024-02-27 ENCOUNTER — OFFICE VISIT (OUTPATIENT)
Dept: FAMILY MEDICINE CLINIC | Facility: HOSPITAL | Age: 64
End: 2024-02-27
Payer: COMMERCIAL

## 2024-02-27 VITALS
WEIGHT: 245.6 LBS | TEMPERATURE: 97.6 F | DIASTOLIC BLOOD PRESSURE: 79 MMHG | SYSTOLIC BLOOD PRESSURE: 129 MMHG | HEIGHT: 69 IN | BODY MASS INDEX: 36.38 KG/M2 | HEART RATE: 68 BPM | OXYGEN SATURATION: 98 %

## 2024-02-27 DIAGNOSIS — E13.9 RENAL CYSTS AND DIABETES SYNDROME (HCC): ICD-10-CM

## 2024-02-27 DIAGNOSIS — H65.22 CHRONIC SEROUS OTITIS MEDIA OF LEFT EAR: Primary | ICD-10-CM

## 2024-02-27 DIAGNOSIS — I10 ESSENTIAL HYPERTENSION: ICD-10-CM

## 2024-02-27 DIAGNOSIS — K76.0 FATTY LIVER: ICD-10-CM

## 2024-02-27 DIAGNOSIS — E66.01 OBESITY, MORBID (HCC): ICD-10-CM

## 2024-02-27 DIAGNOSIS — N18.31 STAGE 3A CHRONIC KIDNEY DISEASE (HCC): ICD-10-CM

## 2024-02-27 DIAGNOSIS — E78.2 MIXED HYPERLIPIDEMIA: ICD-10-CM

## 2024-02-27 DIAGNOSIS — E11.65 TYPE 2 DIABETES MELLITUS WITH HYPERGLYCEMIA, WITHOUT LONG-TERM CURRENT USE OF INSULIN (HCC): ICD-10-CM

## 2024-02-27 PROCEDURE — 99214 OFFICE O/P EST MOD 30 MIN: CPT | Performed by: FAMILY MEDICINE

## 2024-02-27 PROCEDURE — G2211 COMPLEX E/M VISIT ADD ON: HCPCS | Performed by: FAMILY MEDICINE

## 2024-02-27 NOTE — ASSESSMENT & PLAN NOTE
Lab Results   Component Value Date    EGFR 55 (L) 02/22/2024    EGFR 56 (L) 10/23/2023    EGFR 65 06/22/2023    CREATININE 1.42 (H) 02/22/2024    CREATININE 1.39 (H) 10/23/2023    CREATININE 1.23 06/22/2023

## 2024-02-27 NOTE — PROGRESS NOTES
Name: Eugenio Miller      : 1960      MRN: [id number]  Encounter Provider: Noah Callaway MD  Encounter Date: 2024   Encounter department: [facility] [address]     Assessment & Plan     1. Chronic serous otitis media of left ear  -     Ambulatory Referral to Otolaryngology; Future    2. Fatty liver  -     US abdomen complete; Future; Expected date: 2024    3. Type 2 diabetes mellitus with hyperglycemia, without long-term current use of insulin (HCC)  Assessment & Plan:    Lab Results   Component Value Date    HGBA1C 6.4 (H) 2024         4. Essential hypertension  Assessment & Plan:  Excellent BP control      5. Mixed hyperlipidemia    6. Renal cysts and diabetes syndrome (HCC)  -     US abdomen complete; Future; Expected date: 2024    7. Obesity, morbid (HCC)    8. Stage 3a chronic kidney disease (HCC)  Assessment & Plan:  Lab Results   Component Value Date    EGFR 55 (L) 2024    EGFR 56 (L) 10/23/2023    EGFR 65 2023    CREATININE 1.42 (H) 2024    CREATININE 1.39 (H) 10/23/2023    CREATININE 1.23 2023                Subjective     4 month follow up.    Having numbness in L toes  Can't hear out of L ear    Thinks his kidneys are getting worse    Morning sugar 140's          Review of Systems   Constitutional:  Negative for unexpected weight change.   HENT: Negative.     Eyes: Negative.    Respiratory: Negative.     Gastrointestinal:  Positive for abdominal pain.   Genitourinary:  Positive for flank pain.   Musculoskeletal:  Positive for arthralgias and back pain.   All other systems reviewed and are negative.      Past Medical History:   Diagnosis Date    Atrial premature complex     last assessed: 2012    Colon polyp     Depression 2012    Diabetes mellitus (HCC)     Diverticulitis     GERD (gastroesophageal reflux disease)     Herpes simplex infection     resolved: 2017    Hyperlipidemia     Hypertension      Nicotine dependence     last assessed: 05/22/2014    Umbilical hernia     last assessed: 07/30/2014     Past Surgical History:   Procedure Laterality Date    BOWEL RESECTION      COLON SIGMOID RESECTION LAPAROSCOPIC      onset: 07/22/2014;  partial-diverticulitis    COLONOSCOPY      October 2022: Healthy-appearing colorectal anastomosis, internal hemorrhoids, otherwise normal colonoscopy.  Previous colonoscopy with polyps.    UMBILICAL HERNIA REPAIR      onset: 07/22/2014    UPPER GASTROINTESTINAL ENDOSCOPY      October 2022: Irregular Z-line, otherwise normal EGD.  Biopsies negative for Herndon's, EOE, H. pylori.     Family History   Problem Relation Age of Onset    Depression Mother     Heart attack Father         acute    Depression Sister     Depression Sister     Colon cancer Neg Hx     Colon polyps Neg Hx      Social History     Socioeconomic History    Marital status: /Civil Union     Spouse name: None    Number of children: None    Years of education: None    Highest education level: None   Occupational History    Occupation: Full-time employment   Tobacco Use    Smoking status: Former    Smokeless tobacco: Never    Tobacco comments:     History of current every day smoker   Vaping Use    Vaping status: Never Used   Substance and Sexual Activity    Alcohol use: Yes     Comment: Social/occasional     Drug use: No    Sexual activity: Yes     Partners: Female   Other Topics Concern    None   Social History Narrative    None     Social Determinants of Health     Financial Resource Strain: Low Risk  (10/24/2023)    Overall Financial Resource Strain (CARDIA)     Difficulty of Paying Living Expenses: Not very hard   Food Insecurity: Not on file   Transportation Needs: No Transportation Needs (10/24/2023)    PRAPARE - Transportation     Lack of Transportation (Medical): No     Lack of Transportation (Non-Medical): No   Physical Activity: Not on file   Stress: Not on file   Social Connections: Not on file    Intimate Partner Violence: Not on file   Housing Stability: Not on file     Current Outpatient Medications on File Prior to Visit   Medication Sig    amLODIPine-benazepril (LOTREL) 10-40 MG per capsule TAKE 1 CAPSULE BY MOUTH EVERY DAY    Blood Glucose Monitoring Suppl (OneTouch Verio) w/Device KIT Use in the morning    Blood Glucose Monitoring Suppl (True Metrix Air Glucose Meter) w/Device KIT Use 2 (two) times a day    famotidine (PEPCID) 40 MG tablet Take 1 tablet (40 mg total) by mouth daily at bedtime    glimepiride (AMARYL) 2 mg tablet TAKE 1 TABLET BY MOUTH 2 TIMES A DAY.    glucose blood (OneTouch Verio) test strip Use 1 each in the morning    glucose blood (OneTouch Verio) test strip USE TO TEST ONCE DAILY E11.65    Lancets (onetouch ultrasoft) lancets Test once daily    metFORMIN (GLUCOPHAGE) 500 mg tablet TAKE 1 TABLET WITH BREAKFAST TAKE 2 TABLETS WITH EVENING MEAL    omeprazole (PriLOSEC) 40 MG capsule TAKE 1 CAPSULE BY MOUTH EVERY DAY    QUEtiapine (SEROquel) 100 mg tablet TAKE 1 AND 1/2 TABLETS AT BEDTIME    simvastatin (ZOCOR) 40 mg tablet TAKE 1 TABLET BY MOUTH EVERY DAY    TRUEplus Lancets 30G MISC Use 2 (two) times a day Use one lancet each time to test blood sugar two times per day.    metoprolol succinate (TOPROL-XL) 25 mg 24 hr tablet TAKE 1 TABLET BY MOUTH EVERY DAY    [DISCONTINUED] polyethylene glycol (Golytely) 4000 mL solution Take 4,000 mL by mouth once for 1 dose Take 4000 mL by mouth once for 1 dose. Use as directed     No Known Allergies  Immunization History   Administered Date(s) Administered    COVID-19 PFIZER VACCINE 0.3 ML IM 03/09/2021, 03/30/2021, 10/04/2021    COVID-19 Pfizer Vac BIVALENT Bi-sucrose 12 Yr+ IM 09/18/2022, 04/26/2023    COVID-19 Pfizer mRNA vacc PF bi-sucrose 12 yr and older (Comirnaty) 09/21/2023    COVID-19 Pfizer vac (Bi-sucrose, gray cap) 12 yr+ IM 04/28/2022    INFLUENZA 09/29/2017, 09/07/2018, 09/02/2019, 10/01/2021, 09/18/2022, 09/14/2023     "Influenza Quadrivalent Preservative Free 3 years and older IM 09/27/2015, 09/29/2017    Influenza, injectable, quadrivalent, preservative free 0.5 mL 09/01/2020    Pneumococcal Conjugate Vaccine 20-valent (Pcv20), Polysace 06/21/2022    Pneumococcal Polysaccharide PPV23 12/07/2015    Tdap 07/18/2014, 12/20/2016    Zoster 12/07/2015    Zoster Vaccine Recombinant 09/28/2020, 01/29/2021       Objective     /79 (BP Location: Left arm, Patient Position: Sitting, Cuff Size: Large)   Pulse 68   Temp 97.6 °F (36.4 °C) (Tympanic)   Ht 5' 9\" (1.753 m)   Wt 111 kg (245 lb 9.6 oz)   SpO2 98%   BMI 36.27 kg/m²     Physical Exam  Vitals and nursing note reviewed.   Constitutional:       Appearance: Normal appearance.   Neck:      Vascular: No carotid bruit.   Cardiovascular:      Rate and Rhythm: Normal rate and regular rhythm.      Pulses: Normal pulses. no weak pulses.           Dorsalis pedis pulses are 2+ on the right side and 2+ on the left side.        Posterior tibial pulses are 2+ on the right side and 2+ on the left side.      Heart sounds: Normal heart sounds.   Pulmonary:      Effort: Pulmonary effort is normal.      Breath sounds: Normal breath sounds.   Abdominal:      Tenderness: There is abdominal tenderness.   Musculoskeletal:      Right lower leg: No edema.      Left lower leg: No edema.   Feet:      Right foot:      Skin integrity: No ulcer, skin breakdown, erythema, warmth, callus or dry skin.      Left foot:      Skin integrity: No ulcer, skin breakdown, erythema, warmth, callus or dry skin.   Skin:     Findings: No rash.   Neurological:      Mental Status: He is oriented to person, place, and time.   Psychiatric:         Mood and Affect: Mood normal.         Patient's shoes and socks removed.    Right Foot/Ankle   Right Foot Inspection  Skin Exam: skin normal and skin intact. No dry skin, no warmth, no callus, no erythema, no maceration, no abnormal color, no pre-ulcer, no ulcer and no callus. "     Toe Exam: ROM and strength within normal limits.     Sensory   Vibration: intact  Proprioception: intact  Monofilament testing: intact    Vascular  Capillary refills: < 3 seconds  The right DP pulse is 2+. The right PT pulse is 2+.     Left Foot/Ankle  Left Foot Inspection  Skin Exam: skin normal and skin intact. No dry skin, no warmth, no erythema, no maceration, normal color, no pre-ulcer, no ulcer and no callus.     Toe Exam: ROM and strength within normal limits.     Sensory   Vibration: intact  Proprioception: intact  Monofilament testing: intact    Vascular  Capillary refills: < 3 seconds  The left DP pulse is 2+. The left PT pulse is 2+.     Assign Risk Category  No deformity present  No loss of protective sensation  No weak pulses  Risk: 0      Noah Callaway MD

## 2024-03-15 ENCOUNTER — HOSPITAL ENCOUNTER (OUTPATIENT)
Dept: ULTRASOUND IMAGING | Facility: HOSPITAL | Age: 64
End: 2024-03-15
Payer: COMMERCIAL

## 2024-03-15 DIAGNOSIS — E13.9 RENAL CYSTS AND DIABETES SYNDROME (HCC): ICD-10-CM

## 2024-03-15 DIAGNOSIS — K76.0 FATTY LIVER: ICD-10-CM

## 2024-03-15 PROCEDURE — 76700 US EXAM ABDOM COMPLETE: CPT

## 2024-04-01 ENCOUNTER — TELEPHONE (OUTPATIENT)
Dept: FAMILY MEDICINE CLINIC | Facility: HOSPITAL | Age: 64
End: 2024-04-01

## 2024-04-01 NOTE — TELEPHONE ENCOUNTER
Pt made aware of findings  Pt reported mild pain on right side where kidneys are located, been going on for some time.  Is there anything further you wanted to do?   English

## 2024-04-08 ENCOUNTER — TELEPHONE (OUTPATIENT)
Dept: FAMILY MEDICINE CLINIC | Facility: HOSPITAL | Age: 64
End: 2024-04-08

## 2024-04-08 ENCOUNTER — TELEPHONE (OUTPATIENT)
Age: 64
End: 2024-04-08

## 2024-04-08 DIAGNOSIS — N28.1 BILATERAL RENAL CYSTS: Primary | ICD-10-CM

## 2024-04-08 NOTE — TELEPHONE ENCOUNTER
Has internal kidney pain.  Has been having an issue for quite some time and has recently started to get more intense and painful.  Would like a referral to kidney specialist.  Are you able to do this or do you need to see him first?  Please call patient.

## 2024-04-08 NOTE — TELEPHONE ENCOUNTER
Patient called to establish care for renal cysts.    Pt is currently scheduled in [place] with Dr House on 5/31 at 9:15 AM.    Per decision tree patient is to be scheduled within a 2 week time frame and if nothing is avail to send to triage.    Please review. Pt is now c/o of pain in the right kidney since he had the ultrasound.     Call back [phone]

## 2024-04-09 NOTE — TELEPHONE ENCOUNTER
Please advise if appointment on 5/31 with Dr House is appropriate;  Bilateral renal cysts without suspicious features.

## 2024-04-11 ENCOUNTER — TELEPHONE (OUTPATIENT)
Dept: FAMILY MEDICINE CLINIC | Facility: HOSPITAL | Age: 64
End: 2024-04-11

## 2024-04-11 NOTE — TELEPHONE ENCOUNTER
Patient called stating he thinks he has something else wrong with his kidneys since he is having pain when he had the ultrasound and he is not sure if that aggravated the cysts he has .  He is concerned about it. Patient is on the wafting/cancellation list.  Informed patient he should follow up with his family doctor with his concerns for further testing until he establish care with Urology. Patient verbalized understanding .

## 2024-04-11 NOTE — TELEPHONE ENCOUNTER
Has appt with Dr House 5/31.  Since they cannot get him in earlier they asked him to call here and ask you to do tests so they can look at results.  Patient not sure what tests need to be done.  Would like a call back.  Is aware that you are not in today.

## 2024-04-11 NOTE — TELEPHONE ENCOUNTER
See msg below. Per Urology note 4/11, patient is on wait list for an earlier appt, patient was told to f/u with PCP regarding any concerns until he has his appt with Urology.   Do you want to order anything? Please advise. TY  Patient aware you are not in the office until Friday

## 2024-04-15 DIAGNOSIS — N28.1 BILATERAL RENAL CYSTS: Primary | ICD-10-CM

## 2024-04-15 NOTE — TELEPHONE ENCOUNTER
This is Chaz Miller calling. I left a message with Doctor Cesia last week for a referral for a test for my kidneys and I haven't heard back from him at all yet. Second time my phone number is [phone]. Thank you.  You received a voice mail from DARIO HIGHTOWER.

## 2024-04-15 NOTE — PATIENT INSTRUCTIONS
Basic Carbohydrate Counting   AMBULATORY CARE:   Carbohydrate counting  is a way to plan your meals by counting the amount of carbohydrate in foods. Carbohydrates are the sugars, starches, and fiber found in fruit, grains, vegetables, and milk products. Carbohydrates increase your blood sugar levels. Carbohydrate counting can help you eat the right amount of carbohydrate to keep your blood sugar levels under control.   What you need to know about planning meals using carbohydrate counting:  A dietitian or healthcare provider will help you develop a healthy meal plan that works best for you. You will be taught how much carbohydrate to eat or drink for each meal and snack. Your meal plan will be based on your age, weight, usual food intake, and physical activity level. If you have diabetes, it will also include your blood sugar levels and diabetes medicine. Once you know how much carbohydrate you should eat, you can decide what type of food you want to eat.    You will need to know what foods contain carbohydrate and how much they contain. Keep track of the amount of carbohydrate in meals and snacks in order to follow your meal plan. Do not avoid carbohydrates or skip meals. Your blood sugar may fall too low if you do not eat enough carbohydrate or you skip meals.    Foods that contain carbohydrate:   Breads:  Each serving of food listed below contains about 15 g of carbohydrate .    1 slice of bread (1 ounce) or 1 flour or corn tortilla (6 inch)    ½ of a hamburger bun or ¼ of a large bagel (about 1 ounce)    1 pancake (about 4 inches across and ¼ inch thick)    Cereals and grains:  Serving sizes of ready-to-eat cereals vary. Look at the serving size and the total carbohydrate amount listed on the food label. Each serving of food listed below contains about 15 g of carbohydrate .    ¾ cup of dry, unsweetened, ready-to-eat cereal or ¼ cup of low-fat granola     ½ cup of oatmeal or other cooked cereal     ? cup of  cooked rice or pasta    Starchy vegetables and beans:  Each serving of food listed below contains about 15 g of carbohydrate .    ½ cup of corn, green peas, sweet potatoes, or mashed potatoes    ¼ of a large baked potato    ½ cup of beans, lentils, and peas (garbanzo, sykes, kidney, white, split, black-eyed)    Crackers and snacks:  Each serving of food listed below contains about 15 g of carbohydrate .    3 chilo cracker squares or 8 animal crackers     6 saltine-type crackers    3 cups of popcorn or ¾ ounce of pretzels, potato chips, or tortilla chips    Fruit:  Each serving of food listed below contains about 15 g of carbohydrate .    1 small (4 ounce) piece of fresh fruit or ¾ to 1 cup of fresh fruit    ½ cup of canned or frozen fruit, packed in natural juice    ½ cup (4 ounces) of unsweetened fruit juice    2 tablespoons of dried fruit    Desserts or sugary foods:  Each serving of food listed below contains about 15 g of carbohydrate .    2-inch square unfrosted cake or brownie     2 small cookies    ½ cup of ice cream, frozen yogurt, or nondairy frozen yogurt    ¼ cup of sherbet or sorbet    1 tablespoon of regular syrup, jam, or jelly    2 tablespoons of light syrup    Milk and yogurt:  Foods from the milk group contain about 12 g of carbohydrate per serving.    1 cup of fat-free or low-fat milk    1 cup of soy milk    ? cup of fat-free, yogurt sweetened with artificial sweetener    Non-starchy vegetables:  Each serving contains about 5 g of carbohydrate . Three servings of non-starch vegetables count as 1 carbohydrate serving.     ½ cup of cooked vegetables or 1 cup of raw vegetables. This includes beets, broccoli, cabbage, cauliflower, cucumber, mushrooms, tomatoes, and zucchini    ½ cup of vegetable juice    How to use carbohydrate counting to plan meals:   Count carbohydrate amounts using serving sizes:      Pasta dinner example:  You plan to have pasta, tossed salad, and an 8-ounce glass of milk. Your  healthcare provider tells you that you may have 4 carbohydrate servings for dinner. One carbohydrate serving of pasta is ? cup. One cup of pasta will equal 3 carbohydrate servings. An 8-ounce glass of milk will count as 1 carbohydrate serving. These amounts of food would equal 4 carbohydrate servings. One cup of tossed salad does not count toward your carbohydrate servings.       Count carbohydrate amounts using food labels:  Find the total amount of carbohydrate in a packaged food by reading the food label. Food labels tell you the serving size of the food and the total carbohydrate amount in each serving. Find the serving size on the food label and then decide how many servings you will eat. Multiply the number of servings you plan to eat by the carbohydrate amount per serving.     Granola bar snack example:  Your meal plan allows you to have 2 carbohydrate servings (30 grams) of carbohydrate for a snack. You plan to eat 1 package of granola bars, which contains 2 bars. According to the food label, the serving size of food in this package is 1 bar. Each serving (1 bar) contains 25 grams of carbohydrate. The total amount of carbohydrate in this package of granola bars would be 50 g. Based on your meal plan, you should eat only 1 bar.      Follow up with your doctor as directed:  Write down your questions so you remember to ask them during your visits.  © Copyright [employer] 2023 Information is for End User's use only and may not be sold, redistributed or otherwise used for commercial purposes.  The above information is an  only. It is not intended as medical advice for individual conditions or treatments. Talk to your doctor, nurse or pharmacist before following any medical regimen to see if it is safe and effective for you.

## 2024-04-29 ENCOUNTER — OFFICE VISIT (OUTPATIENT)
Dept: CARDIOLOGY CLINIC | Facility: CLINIC | Age: 64
End: 2024-04-29
Payer: COMMERCIAL

## 2024-04-29 VITALS
HEART RATE: 68 BPM | DIASTOLIC BLOOD PRESSURE: 70 MMHG | WEIGHT: 254 LBS | HEIGHT: 69 IN | BODY MASS INDEX: 37.62 KG/M2 | SYSTOLIC BLOOD PRESSURE: 120 MMHG

## 2024-04-29 DIAGNOSIS — E78.2 MIXED HYPERLIPIDEMIA: ICD-10-CM

## 2024-04-29 DIAGNOSIS — I49.3 SYMPTOMATIC PVCS: ICD-10-CM

## 2024-04-29 DIAGNOSIS — I10 ESSENTIAL HYPERTENSION: Primary | ICD-10-CM

## 2024-04-29 PROCEDURE — 3074F SYST BP LT 130 MM HG: CPT | Performed by: INTERNAL MEDICINE

## 2024-04-29 PROCEDURE — 1160F RVW MEDS BY RX/DR IN RCRD: CPT | Performed by: INTERNAL MEDICINE

## 2024-04-29 PROCEDURE — 99214 OFFICE O/P EST MOD 30 MIN: CPT | Performed by: INTERNAL MEDICINE

## 2024-04-29 PROCEDURE — 1159F MED LIST DOCD IN RCRD: CPT | Performed by: INTERNAL MEDICINE

## 2024-04-29 PROCEDURE — 93000 ELECTROCARDIOGRAM COMPLETE: CPT | Performed by: INTERNAL MEDICINE

## 2024-04-29 PROCEDURE — 3078F DIAST BP <80 MM HG: CPT | Performed by: INTERNAL MEDICINE

## 2024-04-29 NOTE — PROGRESS NOTES
Cardiology Follow Up    Eugenio Miller  1960  [id number]  [facility] [place] [address]  [facility] [place]  [facility] [id number] [phone]    1. Essential hypertension  POCT ECG      2. Symptomatic PVCs        3. Mixed hyperlipidemia            Interval History: Followup htn, pvcs    Overall doing well. No chest pain, dyspnea or palpitations.     Medical Problems       Problem List       BPH with obstruction/lower urinary tract symptoms    Chronic pain of right knee    Depression    Type 2 diabetes mellitus with hyperglycemia, without long-term current use of insulin (HCC)    Overview Signed 4/10/2018  4:44 PM by Noah Callaway MD     Transitioned From: Hyperglycemia           Lab Results   Component Value Date    HGBA1C 6.4 (H) 02/22/2024         Diverticulosis of large intestine without perforation or abscess without bleeding    Esophagitis, reflux    Mixed hyperlipidemia    Essential hypertension    BMI 36.0-36.9,adult    Obstructive sleep apnea    Heart palpitations    Persistent insomnia    Myalgia due to statin    Bradycardia    Symptomatic PVCs    Rectal bleeding    History of colon polyps    Gastroesophageal reflux disease without esophagitis    Diverticulitis    Diverticular disease of colon    Primary osteoarthritis of right knee    Serous otitis media with rupture of tympanic membrane, left    Insomnia secondary to anxiety    Tinnitus of both ears    Fatty liver    Renal cysts and diabetes syndrome (HCC)      Lab Results   Component Value Date    HGBA1C 6.4 (H) 02/22/2024         Tongue lesion    Personal history of nicotine dependence    Stage 3a chronic kidney disease (HCC)    Lab Results   Component Value Date    EGFR 55 (L) 02/22/2024    EGFR 56 (L) 10/23/2023    EGFR 65 06/22/2023    CREATININE 1.42 (H) 02/22/2024    CREATININE 1.39 (H) 10/23/2023    CREATININE 1.23 06/22/2023         Bilateral renal cysts     Obesity, morbid (HCC)        Past Medical History:   Diagnosis Date    Atrial premature complex     last assessed: 05/23/2012    Colon polyp     Depression 07/09/2012    Diabetes mellitus (HCC)     Diverticulitis     GERD (gastroesophageal reflux disease)     Herpes simplex infection     resolved: 07/26/2017    Hyperlipidemia     Hypertension     Nicotine dependence     last assessed: 05/22/2014    Umbilical hernia     last assessed: 07/30/2014     Social History     Socioeconomic History    Marital status: /Civil Union     Spouse name: Not on file    Number of children: Not on file    Years of education: Not on file    Highest education level: Not on file   Occupational History    Occupation: Full-time employment   Tobacco Use    Smoking status: Former    Smokeless tobacco: Never    Tobacco comments:     History of current every day smoker   Vaping Use    Vaping status: Never Used   Substance and Sexual Activity    Alcohol use: Yes     Comment: Social/occasional     Drug use: No    Sexual activity: Yes     Partners: Female   Other Topics Concern    Not on file   Social History Narrative    Not on file     Social Determinants of Health     Financial Resource Strain: Low Risk  (10/24/2023)    Overall Financial Resource Strain (CARDIA)     Difficulty of Paying Living Expenses: Not very hard   Food Insecurity: Not on file   Transportation Needs: No Transportation Needs (10/24/2023)    PRAPARE - Transportation     Lack of Transportation (Medical): No     Lack of Transportation (Non-Medical): No   Physical Activity: Not on file   Stress: Not on file   Social Connections: Not on file   Intimate Partner Violence: Not on file   Housing Stability: Not on file      Family History   Problem Relation Age of Onset    Depression Mother     Heart attack Father         acute    Depression Sister     Depression Sister     Colon cancer Neg Hx     Colon polyps Neg Hx      Past Surgical History:   Procedure Laterality Date     BOWEL RESECTION      COLON SIGMOID RESECTION LAPAROSCOPIC      onset: 07/22/2014;  partial-diverticulitis    COLONOSCOPY      October 2022: Healthy-appearing colorectal anastomosis, internal hemorrhoids, otherwise normal colonoscopy.  Previous colonoscopy with polyps.    UMBILICAL HERNIA REPAIR      onset: 07/22/2014    UPPER GASTROINTESTINAL ENDOSCOPY      October 2022: Irregular Z-line, otherwise normal EGD.  Biopsies negative for Henrdon's, EOE, H. pylori.       Current Outpatient Medications:     amLODIPine-benazepril (LOTREL) 10-40 MG per capsule, TAKE 1 CAPSULE BY MOUTH EVERY DAY, Disp: 90 capsule, Rfl: 1    Blood Glucose Monitoring Suppl (OneTouch Verio) w/Device KIT, Use in the morning, Disp: 1 kit, Rfl: 0    Blood Glucose Monitoring Suppl (True Metrix Air Glucose Meter) w/Device KIT, Use 2 (two) times a day, Disp: 1 kit, Rfl: 0    glimepiride (AMARYL) 2 mg tablet, TAKE 1 TABLET BY MOUTH 2 TIMES A DAY., Disp: 180 tablet, Rfl: 1    glucose blood (OneTouch Verio) test strip, Use 1 each in the morning, Disp: 100 strip, Rfl: 3    glucose blood (OneTouch Verio) test strip, USE TO TEST ONCE DAILY E11.65, Disp: 100 strip, Rfl: 3    Lancets (onetouch ultrasoft) lancets, Test once daily, Disp: 100 each, Rfl: 3    metFORMIN (GLUCOPHAGE) 500 mg tablet, TAKE 1 TABLET WITH BREAKFAST TAKE 2 TABLETS WITH EVENING MEAL, Disp: 270 tablet, Rfl: 3    metoprolol succinate (TOPROL-XL) 25 mg 24 hr tablet, TAKE 1 TABLET BY MOUTH EVERY DAY, Disp: 90 tablet, Rfl: 1    omeprazole (PriLOSEC) 40 MG capsule, TAKE 1 CAPSULE BY MOUTH EVERY DAY, Disp: 90 capsule, Rfl: 1    QUEtiapine (SEROquel) 100 mg tablet, TAKE 1 AND 1/2 TABLETS AT BEDTIME, Disp: 135 tablet, Rfl: 1    simvastatin (ZOCOR) 40 mg tablet, TAKE 1 TABLET BY MOUTH EVERY DAY, Disp: 90 tablet, Rfl: 2    TRUEplus Lancets 30G MISC, Use 2 (two) times a day Use one lancet each time to test blood sugar two times per day., Disp: 200 each, Rfl: 1    famotidine (PEPCID) 40 MG tablet,  Take 1 tablet (40 mg total) by mouth daily at bedtime (Patient not taking: Reported on 4/29/2024), Disp: 30 tablet, Rfl: 3  No Known Allergies    Labs:     Chemistry        Component Value Date/Time     11/21/2017 0948    K 4.9 02/22/2024 0924     02/22/2024 0924    CO2 21 02/22/2024 0924    BUN 20 02/22/2024 0924    CREATININE 1.42 (H) 02/22/2024 0924    CREATININE 1.09 11/21/2017 0948        Component Value Date/Time    CALCIUM 9.6 11/21/2017 0948    ALKPHOS 63 11/21/2017 0948    AST 17 02/22/2024 0924    ALT 20 02/22/2024 0924    BILITOT 0.4 11/21/2017 0948            Lab Results   Component Value Date    CHOL 146 11/21/2017    CHOL 177 07/24/2017    CHOL 172 12/07/2016     Lab Results   Component Value Date    HDL 37 (L) 02/22/2024    HDL 41 10/23/2023    HDL 39 (L) 06/22/2023     Lab Results   Component Value Date    LDLCALC 92 02/22/2024    LDLCALC 94 10/23/2023    LDLCALC 103 (H) 06/22/2023     Lab Results   Component Value Date    TRIG 162 (H) 02/22/2024    TRIG 184 (H) 10/23/2023    TRIG 165 (H) 06/22/2023     Lab Results   Component Value Date    CHOLHDL 3.8 04/06/2018       Imaging: No results found.    EKG: NSR Normal ECG.    Review of Systems   Constitutional: Negative.   HENT: Negative.     Eyes: Negative.    Cardiovascular: Negative.    Respiratory: Negative.     Endocrine: Negative.    Hematologic/Lymphatic: Negative.    Skin: Negative.    Musculoskeletal: Negative.    Gastrointestinal: Negative.    Genitourinary: Negative.    Neurological: Negative.    Psychiatric/Behavioral: Negative.     Allergic/Immunologic: Negative.        Vitals:    04/29/24 0819   BP: 120/70   Pulse: 68           Physical Exam  Vitals reviewed.   Constitutional:       Appearance: Normal appearance.   HENT:      Head: Normocephalic.      Nose: Nose normal.      Mouth/Throat:      Mouth: Mucous membranes are moist.      Pharynx: Oropharynx is clear.   Eyes:      General: No scleral icterus.     Conjunctiva/sclera:  Conjunctivae normal.   Cardiovascular:      Rate and Rhythm: Normal rate and regular rhythm.      Heart sounds: No murmur heard.     No friction rub. No gallop.   Pulmonary:      Effort: Pulmonary effort is normal. No respiratory distress.      Breath sounds: Normal breath sounds. No wheezing or rales.   Abdominal:      General: Abdomen is flat. Bowel sounds are normal. There is no distension.      Palpations: Abdomen is soft.      Tenderness: There is no abdominal tenderness. There is no guarding.   Musculoskeletal:      Cervical back: Normal range of motion and neck supple.      Right lower leg: No edema.      Left lower leg: No edema.   Skin:     General: Skin is warm and dry.   Neurological:      General: No focal deficit present.      Mental Status: He is alert and oriented to person, place, and time.   Psychiatric:         Mood and Affect: Mood normal.         Behavior: Behavior normal.         Discussion/Summary:    1. PVCs: He is minimally symptomatic. He has a hx of longstanding PVCs. Continue Metoprolol. Ef is normal. No changes.      2. HTN: His BP is well controlled today. No changes. Cr. 1.42.      3. Dyslipidemia: Last LDL was 92. Continue simvastatin. Mild coronary atherosclerosis on his coronary calcium score.             The patient was counseled regarding diagnostic results, instructions for management, risk factor reductions, impressions. total time of encounter was 25 minutes and 15 minutes was spent counseling.    Yes

## 2024-05-02 ENCOUNTER — HOSPITAL ENCOUNTER (OUTPATIENT)
Dept: CT IMAGING | Facility: HOSPITAL | Age: 64
Discharge: HOME | End: 2024-05-02
Payer: COMMERCIAL

## 2024-05-02 DIAGNOSIS — N28.1 BILATERAL RENAL CYSTS: ICD-10-CM

## 2024-05-02 PROCEDURE — 74177 CT ABD & PELVIS W/CONTRAST: CPT

## 2024-05-02 RX ADMIN — IOHEXOL 100 ML: 350 INJECTION, SOLUTION INTRAVENOUS at 18:28

## 2024-05-07 ENCOUNTER — TELEPHONE (OUTPATIENT)
Age: 64
End: 2024-05-07

## 2024-05-07 NOTE — TELEPHONE ENCOUNTER
Patient is calling to ask if the results of his scan are ready to go over with Dr Callaway, please call when ready

## 2024-05-09 ENCOUNTER — TELEPHONE (OUTPATIENT)
Age: 64
End: 2024-05-09

## 2024-05-09 NOTE — TELEPHONE ENCOUNTER
Pt called to get results of recent CT. I informed pt that we will call him as soon and  reads and signs off.    ISHA

## 2024-05-13 NOTE — TELEPHONE ENCOUNTER
Patient returned call for results. Advised of results per PCP notes. He verbalized understanding however believes he has something going on with kidney causing pain. Or his liver. States he has a fatty liver. Unsure if that could cause pain. Please advise. Pain is in right side of back.

## 2024-05-21 LAB
LEFT EYE DIABETIC RETINOPATHY: NORMAL
RIGHT EYE DIABETIC RETINOPATHY: NORMAL

## 2024-05-22 DIAGNOSIS — I10 ESSENTIAL HYPERTENSION: ICD-10-CM

## 2024-05-22 DIAGNOSIS — F41.9 INSOMNIA SECONDARY TO ANXIETY: ICD-10-CM

## 2024-05-22 DIAGNOSIS — E78.2 MIXED HYPERLIPIDEMIA: ICD-10-CM

## 2024-05-22 DIAGNOSIS — F51.05 INSOMNIA SECONDARY TO ANXIETY: ICD-10-CM

## 2024-05-22 DIAGNOSIS — K21.00 GASTROESOPHAGEAL REFLUX DISEASE WITH ESOPHAGITIS: ICD-10-CM

## 2024-05-22 DIAGNOSIS — E11.65 TYPE 2 DIABETES MELLITUS WITH HYPERGLYCEMIA, WITHOUT LONG-TERM CURRENT USE OF INSULIN (HCC): ICD-10-CM

## 2024-05-23 RX ORDER — AMLODIPINE AND BENAZEPRIL HYDROCHLORIDE 10; 40 MG/1; MG/1
1 CAPSULE ORAL DAILY
Qty: 90 CAPSULE | Refills: 1 | Status: SHIPPED | OUTPATIENT
Start: 2024-05-23

## 2024-05-23 RX ORDER — OMEPRAZOLE 40 MG/1
40 CAPSULE, DELAYED RELEASE ORAL DAILY
Qty: 90 CAPSULE | Refills: 1 | Status: SHIPPED | OUTPATIENT
Start: 2024-05-23

## 2024-05-23 RX ORDER — GLIMEPIRIDE 2 MG/1
2 TABLET ORAL 2 TIMES DAILY
Qty: 180 TABLET | Refills: 1 | Status: SHIPPED | OUTPATIENT
Start: 2024-05-23

## 2024-05-23 RX ORDER — METOPROLOL SUCCINATE 25 MG/1
25 TABLET, EXTENDED RELEASE ORAL DAILY
Qty: 90 TABLET | Refills: 1 | Status: SHIPPED | OUTPATIENT
Start: 2024-05-23

## 2024-05-23 RX ORDER — SIMVASTATIN 40 MG
TABLET ORAL
Qty: 90 TABLET | Refills: 2 | Status: SHIPPED | OUTPATIENT
Start: 2024-05-23

## 2024-05-23 RX ORDER — QUETIAPINE FUMARATE 100 MG/1
TABLET, FILM COATED ORAL
Qty: 135 TABLET | Refills: 1 | Status: SHIPPED | OUTPATIENT
Start: 2024-05-23

## 2024-05-29 NOTE — PROGRESS NOTES
"     Problem List Items Addressed This Visit       BPH with obstruction/lower urinary tract symptoms - Primary     Some enlargement of his prostate on imaging, this appears benign, normal PSA, low risk for prostate cancer.  He can consider medical therapies for lower urinary tract symptoms in the future as needed         Bilateral renal cysts     Bosniak 1 renal cyst, I do not believe that his discomfort on the right back is coming from his cyst however this is theoretically possible.  I did offer him referral to interventional radiology for cyst aspiration and possible sclerosis, he wishes to wait on this at this time and this is reasonable.  We will see him back in 6 months to check on his discomfort.  Suspect that this will have improved as I did recommend that he start a movement and mobility practice as the likely source of this pain is musculoskeletal              Portions of the above record have been created with voice recognition software.  Occasional wrong word or \"sound alike\" substitution may have occurred due to the inherent limitations of voice recognition software.  Read the chart carefully and recognize, using context, where substitution may have occurred.    Assessment and plan:       Please see problem oriented charting for the assessment plan of today's urological complaints      Noah House MD      Chief Complaint     As listed above      History of Present Illness     Eugenio Miller is a 64 y.o. man presenting in consultation for renal cysts, some right-sided flank and back pain, and an enlarged prostate noted on imaging.    The patient was referred by Noah Callaway MD  and today's note has been sent to the referring provider.    With regard to this complaint it is localized to the right flank.  The quality is described as initially sharp discomfort in the right flank which is improving with time and is aching now but improving and the severity of this complaint is described as mild. "  These symptoms have been present for weeks and the timing is ongoing but resolving. Previous treatments include none and previous work-up includes CT scan.  The patient mentions nothing as aggravating and alleviating factors, respectively.  The following associated signs and symptoms are mentioned: None..    The following portions of the patient's history were reviewed and updated as appropriate: allergies, current medications, past family history, past medical history, past social history, past surgical history and problem list.    Detailed Urologic History     - please refer to HPI    Review of Systems     Review of Systems   Constitutional: Negative.    HENT: Negative.     Eyes: Negative.    Respiratory: Negative.     Cardiovascular: Negative.    Gastrointestinal: Negative.    Endocrine: Negative.    Genitourinary:  Positive for flank pain (right).   Skin: Negative.    Allergic/Immunologic: Negative.    Neurological: Negative.    Hematological: Negative.    Psychiatric/Behavioral: Negative.                 Allergies     No Known Allergies    Physical Exam     Physical Exam  Vitals and nursing note reviewed.   Constitutional:       General: He is not in acute distress.     Appearance: Normal appearance. He is well-developed. He is not ill-appearing, toxic-appearing or diaphoretic.   HENT:      Head: Normocephalic and atraumatic.   Eyes:      General: No scleral icterus.  Pulmonary:      Effort: Pulmonary effort is normal. No respiratory distress.   Abdominal:      General: There is no distension.      Palpations: Abdomen is soft.      Tenderness: There is no abdominal tenderness.   Musculoskeletal:         General: No deformity.      Cervical back: Neck supple.   Skin:     Coloration: Skin is not jaundiced or pale.   Neurological:      Mental Status: He is alert and oriented to person, place, and time. Mental status is at baseline.      Cranial Nerves: No cranial nerve deficit.      Motor: No weakness.       "Coordination: Coordination normal.   Psychiatric:         Mood and Affect: Mood normal.         Behavior: Behavior normal.         Thought Content: Thought content normal.         Judgment: Judgment normal.             Vital Signs  Vitals:    05/31/24 0915   BP: 132/78   BP Location: Left arm   Patient Position: Sitting   Cuff Size: Adult   Pulse: 67   SpO2: 98%   Weight: 111 kg (244 lb 9.6 oz)   Height: 5' 9\" (1.753 m)         Current Medications       Current Outpatient Medications:     amLODIPine-benazepril (LOTREL) 10-40 MG per capsule, TAKE 1 CAPSULE BY MOUTH EVERY DAY, Disp: 90 capsule, Rfl: 1    Blood Glucose Monitoring Suppl (OneTouch Verio) w/Device KIT, Use in the morning, Disp: 1 kit, Rfl: 0    Blood Glucose Monitoring Suppl (True Metrix Air Glucose Meter) w/Device KIT, Use 2 (two) times a day, Disp: 1 kit, Rfl: 0    glimepiride (AMARYL) 2 mg tablet, TAKE 1 TABLET BY MOUTH TWICE A DAY, Disp: 180 tablet, Rfl: 1    glucose blood (OneTouch Verio) test strip, Use 1 each in the morning, Disp: 100 strip, Rfl: 3    glucose blood (OneTouch Verio) test strip, USE TO TEST ONCE DAILY E11.65, Disp: 100 strip, Rfl: 3    Lancets (onetouch ultrasoft) lancets, Test once daily, Disp: 100 each, Rfl: 3    metFORMIN (GLUCOPHAGE) 500 mg tablet, TAKE 1 TABLET WITH BREAKFAST TAKE 2 TABLETS WITH EVENING MEAL, Disp: 270 tablet, Rfl: 3    metoprolol succinate (TOPROL-XL) 25 mg 24 hr tablet, TAKE 1 TABLET BY MOUTH EVERY DAY, Disp: 90 tablet, Rfl: 1    omeprazole (PriLOSEC) 40 MG capsule, TAKE 1 CAPSULE BY MOUTH EVERY DAY, Disp: 90 capsule, Rfl: 1    QUEtiapine (SEROquel) 100 mg tablet, TAKE 1 AND 1/2 TABLETS DAILY BY MOUTH AT BEDTIME, Disp: 135 tablet, Rfl: 1    simvastatin (ZOCOR) 40 mg tablet, TAKE 1 TABLET BY MOUTH EVERY DAY, Disp: 90 tablet, Rfl: 2    famotidine (PEPCID) 40 MG tablet, Take 1 tablet (40 mg total) by mouth daily at bedtime (Patient not taking: Reported on 4/29/2024), Disp: 30 tablet, Rfl: 3    TRUEplus Lancets " 30G MISC, Use 2 (two) times a day Use one lancet each time to test blood sugar two times per day. (Patient not taking: Reported on 5/31/2024), Disp: 200 each, Rfl: 1      Active Problems     Patient Active Problem List   Diagnosis    BPH with obstruction/lower urinary tract symptoms    Chronic pain of right knee    Depression    Type 2 diabetes mellitus with hyperglycemia, without long-term current use of insulin (HCC)    Diverticulosis of large intestine without perforation or abscess without bleeding    Esophagitis, reflux    Mixed hyperlipidemia    Essential hypertension    BMI 36.0-36.9,adult    Obstructive sleep apnea    Heart palpitations    Persistent insomnia    Myalgia due to statin    Bradycardia    Symptomatic PVCs    Rectal bleeding    History of colon polyps    Gastroesophageal reflux disease without esophagitis    Diverticulitis    Diverticular disease of colon    Primary osteoarthritis of right knee    Serous otitis media with rupture of tympanic membrane, left    Insomnia secondary to anxiety    Tinnitus of both ears    Fatty liver    Renal cysts and diabetes syndrome (HCC)    Tongue lesion    Personal history of nicotine dependence    Stage 3a chronic kidney disease (HCC)    Bilateral renal cysts    Obesity, morbid (HCC)         Past Medical History     Past Medical History:   Diagnosis Date    Atrial premature complex     last assessed: 05/23/2012    Colon polyp     Depression 07/09/2012    Diabetes mellitus (HCC)     Diverticulitis     GERD (gastroesophageal reflux disease)     Herpes simplex infection     resolved: 07/26/2017    Hyperlipidemia     Hypertension     Nicotine dependence     last assessed: 05/22/2014    Umbilical hernia     last assessed: 07/30/2014         Surgical History     Past Surgical History:   Procedure Laterality Date    BOWEL RESECTION      COLON SIGMOID RESECTION LAPAROSCOPIC      onset: 07/22/2014;  partial-diverticulitis    COLONOSCOPY      October 2022:  Healthy-appearing colorectal anastomosis, internal hemorrhoids, otherwise normal colonoscopy.  Previous colonoscopy with polyps.    UMBILICAL HERNIA REPAIR      onset: 07/22/2014    UPPER GASTROINTESTINAL ENDOSCOPY      October 2022: Irregular Z-line, otherwise normal EGD.  Biopsies negative for Herndon's, EOE, H. pylori.         Family History     Family History   Problem Relation Age of Onset    Depression Mother     Heart attack Father         acute    Depression Sister     Depression Sister     Colon cancer Neg Hx     Colon polyps Neg Hx          Social History     Social History     Social History     Tobacco Use   Smoking Status Former   Smokeless Tobacco Never   Tobacco Comments    History of current every day smoker         Pertinent Lab Values     Lab Results   Component Value Date    CREATININE 1.42 (H) 02/22/2024       Lab Results   Component Value Date    PSA 2.8 02/22/2024    PSA 2.6 12/29/2022    PSA 2.3 12/13/2021     Low risk for prostate cancer          Pertinent Imaging       The patient's images were reviewed by me personally  using our PACS imaging system.  The imaging findings are significant for bilateral Bosniak 1 renal cysts, right greater than left, prostate, smooth.

## 2024-05-31 ENCOUNTER — OFFICE VISIT (OUTPATIENT)
Dept: UROLOGY | Facility: HOSPITAL | Age: 64
End: 2024-05-31
Payer: COMMERCIAL

## 2024-05-31 ENCOUNTER — TELEPHONE (OUTPATIENT)
Age: 64
End: 2024-05-31

## 2024-05-31 VITALS
SYSTOLIC BLOOD PRESSURE: 132 MMHG | WEIGHT: 244.6 LBS | BODY MASS INDEX: 36.23 KG/M2 | HEART RATE: 67 BPM | OXYGEN SATURATION: 98 % | HEIGHT: 69 IN | DIASTOLIC BLOOD PRESSURE: 78 MMHG

## 2024-05-31 DIAGNOSIS — N40.1 BPH WITH OBSTRUCTION/LOWER URINARY TRACT SYMPTOMS: Primary | ICD-10-CM

## 2024-05-31 DIAGNOSIS — N28.1 BILATERAL RENAL CYSTS: ICD-10-CM

## 2024-05-31 DIAGNOSIS — N13.8 BPH WITH OBSTRUCTION/LOWER URINARY TRACT SYMPTOMS: Primary | ICD-10-CM

## 2024-05-31 PROCEDURE — 99203 OFFICE O/P NEW LOW 30 MIN: CPT | Performed by: UROLOGY

## 2024-05-31 NOTE — ASSESSMENT & PLAN NOTE
Some enlargement of his prostate on imaging, this appears benign, normal PSA, low risk for prostate cancer.  He can consider medical therapies for lower urinary tract symptoms in the future as needed

## 2024-05-31 NOTE — TELEPHONE ENCOUNTER
Please advise patient after consulting with provider as to his pharmacy reporting to patient he should stop the omeprazole due to long term use, alternative medication may be required. Please follow up with patient   Routed to office staff for follow up

## 2024-05-31 NOTE — ASSESSMENT & PLAN NOTE
Promise 1 renal cyst, I do not believe that his discomfort on the right back is coming from his cyst however this is theoretically possible.  I did offer him referral to interventional radiology for cyst aspiration and possible sclerosis, he wishes to wait on this at this time and this is reasonable.  We will see him back in 6 months to check on his discomfort.  Suspect that this will have improved as I did recommend that he start a movement and mobility practice as the likely source of this pain is musculoskeletal

## 2024-06-01 PROBLEM — E11.22 TYPE 2 DIABETES MELLITUS WITH STAGE 3A CHRONIC KIDNEY DISEASE (HCC): Status: ACTIVE | Noted: 2024-06-01

## 2024-06-01 PROBLEM — N18.31 TYPE 2 DIABETES MELLITUS WITH STAGE 3A CHRONIC KIDNEY DISEASE (HCC): Status: ACTIVE | Noted: 2024-06-01

## 2024-07-01 ENCOUNTER — OFFICE VISIT (OUTPATIENT)
Dept: FAMILY MEDICINE CLINIC | Facility: HOSPITAL | Age: 64
End: 2024-07-01
Payer: COMMERCIAL

## 2024-07-01 VITALS
OXYGEN SATURATION: 98 % | WEIGHT: 240.6 LBS | BODY MASS INDEX: 35.63 KG/M2 | SYSTOLIC BLOOD PRESSURE: 124 MMHG | TEMPERATURE: 97.5 F | HEART RATE: 70 BPM | HEIGHT: 69 IN | DIASTOLIC BLOOD PRESSURE: 70 MMHG

## 2024-07-01 DIAGNOSIS — E11.65 TYPE 2 DIABETES MELLITUS WITH HYPERGLYCEMIA, WITHOUT LONG-TERM CURRENT USE OF INSULIN (HCC): ICD-10-CM

## 2024-07-01 DIAGNOSIS — E13.9 RENAL CYSTS AND DIABETES SYNDROME (HCC): ICD-10-CM

## 2024-07-01 DIAGNOSIS — E78.2 MIXED HYPERLIPIDEMIA: ICD-10-CM

## 2024-07-01 DIAGNOSIS — E11.22 TYPE 2 DIABETES MELLITUS WITH STAGE 3A CHRONIC KIDNEY DISEASE, WITHOUT LONG-TERM CURRENT USE OF INSULIN (HCC): ICD-10-CM

## 2024-07-01 DIAGNOSIS — N18.31 TYPE 2 DIABETES MELLITUS WITH STAGE 3A CHRONIC KIDNEY DISEASE, WITHOUT LONG-TERM CURRENT USE OF INSULIN (HCC): ICD-10-CM

## 2024-07-01 DIAGNOSIS — I10 ESSENTIAL HYPERTENSION: Primary | ICD-10-CM

## 2024-07-01 DIAGNOSIS — N18.31 STAGE 3A CHRONIC KIDNEY DISEASE (HCC): ICD-10-CM

## 2024-07-01 LAB — SL AMB POCT HEMOGLOBIN AIC: 6.6 (ref ?–6.5)

## 2024-07-01 PROCEDURE — 83036 HEMOGLOBIN GLYCOSYLATED A1C: CPT | Performed by: FAMILY MEDICINE

## 2024-07-01 PROCEDURE — 99214 OFFICE O/P EST MOD 30 MIN: CPT | Performed by: FAMILY MEDICINE

## 2024-07-01 NOTE — PROGRESS NOTES
Ambulatory Visit  Name: Eugenio Miller      : 1960      MRN: [id number]  Encounter Provider: Noah Callaway MD  Encounter Date: 2024   Encounter department: [facility] [address]     Assessment & Plan   1. Essential hypertension  -     TSH, 3rd generation with Free T4 reflex; Future  -     CBC and differential; Future  -     Comprehensive metabolic panel; Future; Expected date: 2024  -     TSH, 3rd generation with Free T4 reflex  -     CBC and differential  -     Comprehensive metabolic panel  2. Type 2 diabetes mellitus with hyperglycemia, without long-term current use of insulin (HCC)  Assessment & Plan:    Lab Results   Component Value Date    HGBA1C 6.6 (A) 2024     Orders:  -     POCT hemoglobin A1c  3. Renal cysts and diabetes syndrome (HCC)  4. Mixed hyperlipidemia  -     Lipid Panel with Direct LDL reflex; Future; Expected date: 2024  -     Lipid Panel with Direct LDL reflex  5. Stage 3a chronic kidney disease (HCC)  Assessment & Plan:  Lab Results   Component Value Date    EGFR 55 (L) 2024    EGFR 56 (L) 10/23/2023    EGFR 65 2023    CREATININE 1.42 (H) 2024    CREATININE 1.39 (H) 10/23/2023    CREATININE 1.23 2023     6. Type 2 diabetes mellitus with stage 3a chronic kidney disease, without long-term current use of insulin (HCC)      Depression Screening and Follow-up Plan: Patient was screened for depression during today's encounter. They screened negative with a PHQ-9 score of 2.        History of Present Illness     4 month follow up.    No recent illness or injury  Working on losing weight    Working more and doing more exercise  Wants to lose about 20 lbs      Review of Systems   Constitutional:  Positive for unexpected weight change.   Respiratory: Negative.     Cardiovascular: Negative.    Gastrointestinal: Negative.    Genitourinary: Negative.    Musculoskeletal:  Positive for arthralgias.   Psychiatric/Behavioral:  Negative.     All other systems reviewed and are negative.    Past Medical History:   Diagnosis Date    Atrial premature complex     last assessed: 05/23/2012    Colon polyp     Depression 07/09/2012    Diabetes mellitus (HCC)     Diverticulitis     GERD (gastroesophageal reflux disease)     Herpes simplex infection     resolved: 07/26/2017    Hyperlipidemia     Hypertension     Nicotine dependence     last assessed: 05/22/2014    Umbilical hernia     last assessed: 07/30/2014     Past Surgical History:   Procedure Laterality Date    BOWEL RESECTION      COLON SIGMOID RESECTION LAPAROSCOPIC      onset: 07/22/2014;  partial-diverticulitis    COLONOSCOPY      October 2022: Healthy-appearing colorectal anastomosis, internal hemorrhoids, otherwise normal colonoscopy.  Previous colonoscopy with polyps.    UMBILICAL HERNIA REPAIR      onset: 07/22/2014    UPPER GASTROINTESTINAL ENDOSCOPY      October 2022: Irregular Z-line, otherwise normal EGD.  Biopsies negative for Herndon's, EOE, H. pylori.     Family History   Problem Relation Age of Onset    Depression Mother     Heart attack Father         acute    Depression Sister     Depression Sister     Colon cancer Neg Hx     Colon polyps Neg Hx      Social History     Tobacco Use    Smoking status: Former    Smokeless tobacco: Never    Tobacco comments:     History of current every day smoker   Vaping Use    Vaping status: Never Used   Substance and Sexual Activity    Alcohol use: Yes     Comment: Social/occasional     Drug use: No    Sexual activity: Yes     Partners: Female     Current Outpatient Medications on File Prior to Visit   Medication Sig    amLODIPine-benazepril (LOTREL) 10-40 MG per capsule TAKE 1 CAPSULE BY MOUTH EVERY DAY    Blood Glucose Monitoring Suppl (OneTouch Verio) w/Device KIT Use in the morning    Blood Glucose Monitoring Suppl (True Metrix Air Glucose Meter) w/Device KIT Use 2 (two) times a day    glimepiride (AMARYL) 2 mg tablet TAKE 1 TABLET BY  "MOUTH TWICE A DAY    glucose blood (OneTouch Verio) test strip Use 1 each in the morning    glucose blood (OneTouch Verio) test strip USE TO TEST ONCE DAILY E11.65    Lancets (onetouch ultrasoft) lancets Test once daily    metFORMIN (GLUCOPHAGE) 500 mg tablet TAKE 1 TABLET WITH BREAKFAST TAKE 2 TABLETS WITH EVENING MEAL    metoprolol succinate (TOPROL-XL) 25 mg 24 hr tablet TAKE 1 TABLET BY MOUTH EVERY DAY    omeprazole (PriLOSEC) 40 MG capsule TAKE 1 CAPSULE BY MOUTH EVERY DAY    QUEtiapine (SEROquel) 100 mg tablet TAKE 1 AND 1/2 TABLETS DAILY BY MOUTH AT BEDTIME    simvastatin (ZOCOR) 40 mg tablet TAKE 1 TABLET BY MOUTH EVERY DAY    [DISCONTINUED] polyethylene glycol (Golytely) 4000 mL solution Take 4,000 mL by mouth once for 1 dose Take 4000 mL by mouth once for 1 dose. Use as directed     No Known Allergies  Immunization History   Administered Date(s) Administered    COVID-19 PFIZER VACCINE 0.3 ML IM 03/09/2021, 03/30/2021, 10/04/2021    COVID-19 Pfizer Vac BIVALENT Bi-sucrose 12 Yr+ IM 09/18/2022, 04/26/2023    COVID-19 Pfizer mRNA vacc PF bi-sucrose 12 yr and older (Comirnaty) 09/21/2023, 05/09/2024    COVID-19 Pfizer vac (Bi-sucrose, gray cap) 12 yr+ IM 04/28/2022    INFLUENZA 09/29/2017, 09/07/2018, 09/02/2019, 10/01/2021, 09/18/2022, 09/14/2023    Influenza Quadrivalent Preservative Free 3 years and older IM 09/27/2015, 09/29/2017    Influenza, injectable, quadrivalent, preservative free 0.5 mL 09/01/2020    Pneumococcal Conjugate Vaccine 20-valent (Pcv20), Polysace 06/21/2022    Pneumococcal Polysaccharide PPV23 12/07/2015    Respiratory Syncytial Virus Vaccine (Recombinant, Adjuvanted) 12/08/2023    Tdap 07/18/2014, 12/20/2016    Zoster 12/07/2015    Zoster Vaccine Recombinant 09/28/2020, 01/29/2021     Objective     /70 (BP Location: Left arm, Patient Position: Sitting, Cuff Size: Large)   Pulse 70   Temp 97.5 °F (36.4 °C) (Tympanic)   Ht 5' 9\" (1.753 m)   Wt 109 kg (240 lb 9.6 oz)   " SpO2 98%   BMI 35.53 kg/m²     Physical Exam  Vitals and nursing note reviewed.   Constitutional:       Appearance: Normal appearance.   Cardiovascular:      Rate and Rhythm: Normal rate and regular rhythm.      Heart sounds: Normal heart sounds.   Pulmonary:      Breath sounds: Normal breath sounds.   Musculoskeletal:      Right lower leg: No edema.      Left lower leg: No edema.   Neurological:      Mental Status: He is oriented to person, place, and time.   Psychiatric:         Mood and Affect: Mood normal.       Administrative Statements

## 2024-07-03 ENCOUNTER — OFFICE VISIT (OUTPATIENT)
Dept: GASTROENTEROLOGY | Facility: CLINIC | Age: 64
End: 2024-07-03
Payer: COMMERCIAL

## 2024-07-03 VITALS
SYSTOLIC BLOOD PRESSURE: 122 MMHG | DIASTOLIC BLOOD PRESSURE: 80 MMHG | HEIGHT: 69 IN | BODY MASS INDEX: 36.14 KG/M2 | WEIGHT: 244 LBS

## 2024-07-03 DIAGNOSIS — Z86.010 HISTORY OF COLON POLYPS: ICD-10-CM

## 2024-07-03 DIAGNOSIS — K62.5 RECTAL BLEEDING: ICD-10-CM

## 2024-07-03 DIAGNOSIS — K21.9 GASTROESOPHAGEAL REFLUX DISEASE WITHOUT ESOPHAGITIS: Primary | ICD-10-CM

## 2024-07-03 DIAGNOSIS — K64.9 HEMORRHOIDS, UNSPECIFIED HEMORRHOID TYPE: ICD-10-CM

## 2024-07-03 PROCEDURE — 99213 OFFICE O/P EST LOW 20 MIN: CPT | Performed by: INTERNAL MEDICINE

## 2024-07-03 NOTE — PROGRESS NOTES
[facility] - Outpatient Follow-up Note  Eugenio Miller 64 y.o. male MRN: [id number]  Encounter: [id number]    ASSESSMENT AND PLAN:      1. Gastroesophageal reflux disease without esophagitis  GERD clinically stable without alarm symptoms of dysphagia weight loss or bleeding.  Was able to come off of nighttime famotidine without any recurrence of symptoms.  Does note that he does get breakthrough symptoms if he stops omeprazole.  Due to concerns about long-term use discussed trying to decrease the dose.  Since it is a capsular form that cannot be cut suggest spacing out dosing to every other day.  Reflux diet and precautions  Continue omeprazole 40 mg but take every other day  If significant breakthrough symptoms resume daily dosing    2. Rectal bleeding  3. Hemorrhoids, unspecified hemorrhoid type  Previously rectal bleeding attributable to hemorrhoids found on colonoscopy.  No bowel or bleeding symptoms presently.  Encourage plenty of fiber and fluids in diet    4. History of colon polyps  Up-to-date with colonoscopic surveillance.  Exam every 5 years recommended.  Next colonoscopy due October 2027      Followup Appointment: 1 year or sooner if needed  ______________________________________________________________________    Chief Complaint   Patient presents with    Follow up-GERD     HPI:  Doing well.  No heartburn or regurgitation as long as he take medication.  She is taking once a day.  Feeling Pepcid and famotidine.  7 years still okay off of itStiull OK off of pepcid.  And going down okay sign.No dyspahgia.  Appetie and weight stable.   Discussed chronic PPI and concerns about affecting kidneys.  Advised need to balance risk and benefit of taking medication with managing his GI symptoms.  Advise trying to wean dose down to take as lower doses possible.    No rectal bleeding.  Stools normal.     Historical Information   Past Medical History:   Diagnosis Date    Atrial  premature complex     last assessed: 05/23/2012    Colon polyp     Depression 07/09/2012    Diabetes mellitus (HCC)     Diverticulitis     GERD (gastroesophageal reflux disease)     Herpes simplex infection     resolved: 07/26/2017    Hyperlipidemia     Hypertension     Nicotine dependence     last assessed: 05/22/2014    Umbilical hernia     last assessed: 07/30/2014     Past Surgical History:   Procedure Laterality Date    BOWEL RESECTION      COLON SIGMOID RESECTION LAPAROSCOPIC      onset: 07/22/2014;  partial-diverticulitis    COLONOSCOPY      October 2022: Healthy-appearing colorectal anastomosis, internal hemorrhoids, otherwise normal colonoscopy.  Previous colonoscopy with polyps.    UMBILICAL HERNIA REPAIR      onset: 07/22/2014    UPPER GASTROINTESTINAL ENDOSCOPY      October 2022: Irregular Z-line, otherwise normal EGD.  Biopsies negative for Herndon's, EOE, H. pylori.     Social History     Substance and Sexual Activity   Alcohol Use Yes    Comment: Social/occasional      Social History     Substance and Sexual Activity   Drug Use No     Social History     Tobacco Use   Smoking Status Former   Smokeless Tobacco Never   Tobacco Comments    History of current every day smoker     Family History   Problem Relation Age of Onset    Depression Mother     Heart attack Father         acute    Depression Sister     Depression Sister     Colon cancer Neg Hx     Colon polyps Neg Hx          Current Outpatient Medications:     amLODIPine-benazepril (LOTREL) 10-40 MG per capsule    Blood Glucose Monitoring Suppl (OneTouch Verio) w/Device KIT    Blood Glucose Monitoring Suppl (True Metrix Air Glucose Meter) w/Device KIT    glimepiride (AMARYL) 2 mg tablet    glucose blood (OneTouch Verio) test strip    glucose blood (OneTouch Verio) test strip    Lancets (onetouch ultrasoft) lancets    metFORMIN (GLUCOPHAGE) 500 mg tablet    metoprolol succinate (TOPROL-XL) 25 mg 24 hr tablet    omeprazole (PriLOSEC) 40 MG capsule     "QUEtiapine (SEROquel) 100 mg tablet    simvastatin (ZOCOR) 40 mg tablet  No Known Allergies  Reviewed medications and allergies and updated as indicated    PHYSICAL EXAM:    Blood pressure 122/80, height 5' 9\" (1.753 m), weight 111 kg (244 lb). Body mass index is 36.03 kg/m².  General Appearance: NAD, cooperative, alert  Eyes: Anicteric, conjunctiva pink  ENT:  Normocephalic, atraumatic, normal mucosa.    Neck:  Supple, symmetrical, trachea midline  Resp:  Clear to auscultation bilaterally; no rales, rhonchi or wheezing; respirations unlabored   CV:  S1 S2, Regular rate and rhythm; no murmur, rub, or gallop.  GI:  Soft, non-tender, non-distended; normal bowel sounds; no masses, no organomegaly   Rectal: Deferred  Musculoskeletal: No cyanosis, clubbing or edema. Normal ROM.  Skin:  No jaundice, rashes, or lesions   Heme/Lymph: No palpable cervical lymphadenopathy  Psych: Normal affect, good eye contact  Neuro: No gross deficits, AAOx3    Lab Results:   Lab Results   Component Value Date    WBC 7.4 06/22/2023    HGB 13.0 06/22/2023    HCT 39.0 06/22/2023    MCV 88 06/22/2023     06/22/2023     Lab Results   Component Value Date     11/21/2017    K 4.9 02/22/2024     02/22/2024    CO2 21 02/22/2024    ANIONGAP 9 07/09/2014    BUN 20 02/22/2024    CREATININE 1.42 (H) 02/22/2024    GLUCOSE 124 (H) 11/21/2017    CALCIUM 9.6 11/21/2017    AST 17 02/22/2024    ALT 20 02/22/2024    ALKPHOS 63 11/21/2017    PROT 7.2 11/21/2017    BILITOT 0.4 11/21/2017    EGFR 55 (L) 02/22/2024       Radiology Results:   No results found.      "

## 2024-07-03 NOTE — PATIENT INSTRUCTIONS
Reflux diet and precautions.  Try to decrease omeprazole to one 40mg capsule every other day.  If having a lot of symptoms, resume daily dosing.  Next colonoscopy due October 2027.

## 2024-07-03 NOTE — LETTER
July 3, 2024     Noah Callaway MD  [facility] [place] [address]    Patient: Eugenio Miller   YOB: 1960   Date of Visit: 7/3/2024       Dear Dr. Callaway:    Thank you for referring Eugenio Miller to me for evaluation. Below are my notes for this consultation.    If you have questions, please do not hesitate to call me. I look forward to following your patient along with you.         Sincerely,        Denise Blanton MD        CC: No Recipients    Denise Blanton MD  7/3/2024 11:31 AM  Sign when Signing Visit  [facility] - Outpatient Follow-up Note  Eugenio Miller 64 y.o. male MRN: [id number]  Encounter: [id number]    ASSESSMENT AND PLAN:      1. Gastroesophageal reflux disease without esophagitis  GERD clinically stable without alarm symptoms of dysphagia weight loss or bleeding.  Was able to come off of nighttime famotidine without any recurrence of symptoms.  Does note that he does get breakthrough symptoms if he stops omeprazole.  Due to concerns about long-term use discussed trying to decrease the dose.  Since it is a capsular form that cannot be cut suggest spacing out dosing to every other day.  Reflux diet and precautions  Continue omeprazole 40 mg but take every other day  If significant breakthrough symptoms resume daily dosing    2. Rectal bleeding  3. Hemorrhoids, unspecified hemorrhoid type  Previously rectal bleeding attributable to hemorrhoids found on colonoscopy.  No bowel or bleeding symptoms presently.  Encourage plenty of fiber and fluids in diet    4. History of colon polyps  Up-to-date with colonoscopic surveillance.  Exam every 5 years recommended.  Next colonoscopy due October 2027      Followup Appointment: 1 year or sooner if needed  ______________________________________________________________________    Chief Complaint   Patient presents with   • Follow up-GERD     HPI:  Doing well.  No heartburn or  regurgitation as long as he take medication.  She is taking once a day.  Feeling Pepcid and famotidine.  7 years still okay off of itStiull OK off of pepcid.  And going down okay sign.No dyspahgia.  Appetie and weight stable.   Discussed chronic PPI and concerns about affecting kidneys.  Advised need to balance risk and benefit of taking medication with managing his GI symptoms.  Advise trying to wean dose down to take as lower doses possible.    No rectal bleeding.  Stools normal.     Historical Information  Past Medical History:   Diagnosis Date   • Atrial premature complex     last assessed: 05/23/2012   • Colon polyp    • Depression 07/09/2012   • Diabetes mellitus (HCC)    • Diverticulitis    • GERD (gastroesophageal reflux disease)    • Herpes simplex infection     resolved: 07/26/2017   • Hyperlipidemia    • Hypertension    • Nicotine dependence     last assessed: 05/22/2014   • Umbilical hernia     last assessed: 07/30/2014     Past Surgical History:   Procedure Laterality Date   • BOWEL RESECTION     • COLON SIGMOID RESECTION LAPAROSCOPIC      onset: 07/22/2014;  partial-diverticulitis   • COLONOSCOPY      October 2022: Healthy-appearing colorectal anastomosis, internal hemorrhoids, otherwise normal colonoscopy.  Previous colonoscopy with polyps.   • UMBILICAL HERNIA REPAIR      onset: 07/22/2014   • UPPER GASTROINTESTINAL ENDOSCOPY      October 2022: Irregular Z-line, otherwise normal EGD.  Biopsies negative for Herndon's, EOE, H. pylori.     Social History     Substance and Sexual Activity   Alcohol Use Yes    Comment: Social/occasional      Social History     Substance and Sexual Activity   Drug Use No     Social History     Tobacco Use   Smoking Status Former   Smokeless Tobacco Never   Tobacco Comments    History of current every day smoker     Family History   Problem Relation Age of Onset   • Depression Mother    • Heart attack Father         acute   • Depression Sister    • Depression Sister    •  "Colon cancer Neg Hx    • Colon polyps Neg Hx          Current Outpatient Medications:   •  amLODIPine-benazepril (LOTREL) 10-40 MG per capsule  •  Blood Glucose Monitoring Suppl (OneTouch Verio) w/Device KIT  •  Blood Glucose Monitoring Suppl (True Metrix Air Glucose Meter) w/Device KIT  •  glimepiride (AMARYL) 2 mg tablet  •  glucose blood (OneTouch Verio) test strip  •  glucose blood (OneTouch Verio) test strip  •  Lancets (onetouch ultrasoft) lancets  •  metFORMIN (GLUCOPHAGE) 500 mg tablet  •  metoprolol succinate (TOPROL-XL) 25 mg 24 hr tablet  •  omeprazole (PriLOSEC) 40 MG capsule  •  QUEtiapine (SEROquel) 100 mg tablet  •  simvastatin (ZOCOR) 40 mg tablet  No Known Allergies  Reviewed medications and allergies and updated as indicated    PHYSICAL EXAM:    Blood pressure 122/80, height 5' 9\" (1.753 m), weight 111 kg (244 lb). Body mass index is 36.03 kg/m².  General Appearance: NAD, cooperative, alert  Eyes: Anicteric, conjunctiva pink  ENT:  Normocephalic, atraumatic, normal mucosa.    Neck:  Supple, symmetrical, trachea midline  Resp:  Clear to auscultation bilaterally; no rales, rhonchi or wheezing; respirations unlabored   CV:  S1 S2, Regular rate and rhythm; no murmur, rub, or gallop.  GI:  Soft, non-tender, non-distended; normal bowel sounds; no masses, no organomegaly   Rectal: Deferred  Musculoskeletal: No cyanosis, clubbing or edema. Normal ROM.  Skin:  No jaundice, rashes, or lesions   Heme/Lymph: No palpable cervical lymphadenopathy  Psych: Normal affect, good eye contact  Neuro: No gross deficits, AAOx3    Lab Results:   Lab Results   Component Value Date    WBC 7.4 06/22/2023    HGB 13.0 06/22/2023    HCT 39.0 06/22/2023    MCV 88 06/22/2023     06/22/2023     Lab Results   Component Value Date     11/21/2017    K 4.9 02/22/2024     02/22/2024    CO2 21 02/22/2024    ANIONGAP 9 07/09/2014    BUN 20 02/22/2024    CREATININE 1.42 (H) 02/22/2024    GLUCOSE 124 (H) 11/21/2017    " CALCIUM 9.6 11/21/2017    AST 17 02/22/2024    ALT 20 02/22/2024    ALKPHOS 63 11/21/2017    PROT 7.2 11/21/2017    BILITOT 0.4 11/21/2017    EGFR 55 (L) 02/22/2024       Radiology Results:   No results found.

## 2024-08-09 ENCOUNTER — TELEPHONE (OUTPATIENT)
Age: 64
End: 2024-08-09

## 2024-08-09 LAB
ALBUMIN SERPL-MCNC: 4.3 G/DL (ref 3.9–4.9)
ALP SERPL-CCNC: 79 IU/L (ref 44–121)
ALT SERPL-CCNC: 24 IU/L (ref 0–44)
AST SERPL-CCNC: 18 IU/L (ref 0–40)
BASOPHILS # BLD AUTO: 0.1 X10E3/UL (ref 0–0.2)
BASOPHILS NFR BLD AUTO: 1 %
BILIRUB SERPL-MCNC: 0.3 MG/DL (ref 0–1.2)
BUN SERPL-MCNC: 18 MG/DL (ref 8–27)
BUN/CREAT SERPL: 14 (ref 10–24)
CALCIUM SERPL-MCNC: 10 MG/DL (ref 8.6–10.2)
CHLORIDE SERPL-SCNC: 105 MMOL/L (ref 96–106)
CHOLEST SERPL-MCNC: 141 MG/DL (ref 100–199)
CO2 SERPL-SCNC: 22 MMOL/L (ref 20–29)
CREAT SERPL-MCNC: 1.29 MG/DL (ref 0.76–1.27)
EGFR: 61 ML/MIN/1.73
EOSINOPHIL # BLD AUTO: 0.3 X10E3/UL (ref 0–0.4)
EOSINOPHIL NFR BLD AUTO: 4 %
ERYTHROCYTE [DISTWIDTH] IN BLOOD BY AUTOMATED COUNT: 12.7 % (ref 11.6–15.4)
GLOBULIN SER-MCNC: 2.9 G/DL (ref 1.5–4.5)
GLUCOSE SERPL-MCNC: 138 MG/DL (ref 70–99)
HCT VFR BLD AUTO: 41.2 % (ref 37.5–51)
HDLC SERPL-MCNC: 33 MG/DL
HGB BLD-MCNC: 13.6 G/DL (ref 13–17.7)
IMM GRANULOCYTES # BLD: 0.1 X10E3/UL (ref 0–0.1)
IMM GRANULOCYTES NFR BLD: 1 %
LDLC SERPL CALC-MCNC: 74 MG/DL (ref 0–99)
LDLC/HDLC SERPL: 2.2 RATIO (ref 0–3.6)
LYMPHOCYTES # BLD AUTO: 2.2 X10E3/UL (ref 0.7–3.1)
LYMPHOCYTES NFR BLD AUTO: 30 %
MCH RBC QN AUTO: 29.3 PG (ref 26.6–33)
MCHC RBC AUTO-ENTMCNC: 33 G/DL (ref 31.5–35.7)
MCV RBC AUTO: 89 FL (ref 79–97)
MONOCYTES # BLD AUTO: 0.7 X10E3/UL (ref 0.1–0.9)
MONOCYTES NFR BLD AUTO: 9 %
NEUTROPHILS # BLD AUTO: 3.9 X10E3/UL (ref 1.4–7)
NEUTROPHILS NFR BLD AUTO: 55 %
PLATELET # BLD AUTO: 254 X10E3/UL (ref 150–450)
POTASSIUM SERPL-SCNC: 4.9 MMOL/L (ref 3.5–5.2)
PROT SERPL-MCNC: 7.2 G/DL (ref 6–8.5)
RBC # BLD AUTO: 4.64 X10E6/UL (ref 4.14–5.8)
SL AMB VLDL CHOLESTEROL CALC: 34 MG/DL (ref 5–40)
SODIUM SERPL-SCNC: 144 MMOL/L (ref 134–144)
TRIGL SERPL-MCNC: 200 MG/DL (ref 0–149)
TSH SERPL DL<=0.005 MIU/L-ACNC: 1.46 UIU/ML (ref 0.45–4.5)
WBC # BLD AUTO: 7.2 X10E3/UL (ref 3.4–10.8)

## 2024-08-09 NOTE — TELEPHONE ENCOUNTER
Patient had called in wanting to know about his lab results, and was curious about if his A1C was completed.     Please have the doctor advise back to the patient in regards to the results, as well as discussing the creatine results, as patient stated they were a little high.

## 2024-08-19 DIAGNOSIS — E11.65 TYPE 2 DIABETES MELLITUS WITH HYPERGLYCEMIA, WITHOUT LONG-TERM CURRENT USE OF INSULIN (HCC): ICD-10-CM

## 2024-08-20 RX ORDER — GLUCOSAM/CHON-MSM1/C/MANG/BOSW 500-416.6
TABLET ORAL
Qty: 200 EACH | Refills: 1 | Status: SHIPPED | OUTPATIENT
Start: 2024-08-20 | End: 2024-08-21 | Stop reason: SDUPTHER

## 2024-08-20 RX ORDER — BLOOD SUGAR DIAGNOSTIC
STRIP MISCELLANEOUS
Qty: 100 STRIP | Refills: 1 | Status: SHIPPED | OUTPATIENT
Start: 2024-08-20

## 2024-08-21 ENCOUNTER — TELEPHONE (OUTPATIENT)
Age: 64
End: 2024-08-21

## 2024-08-21 DIAGNOSIS — E11.65 TYPE 2 DIABETES MELLITUS WITH HYPERGLYCEMIA, WITHOUT LONG-TERM CURRENT USE OF INSULIN (HCC): ICD-10-CM

## 2024-08-21 RX ORDER — BLOOD SUGAR DIAGNOSTIC
STRIP MISCELLANEOUS
Qty: 100 STRIP | Refills: 3 | Status: SHIPPED | OUTPATIENT
Start: 2024-08-21

## 2024-08-21 RX ORDER — GLUCOSAM/CHON-MSM1/C/MANG/BOSW 500-416.6
TABLET ORAL
Qty: 100 EACH | Refills: 1 | Status: SHIPPED | OUTPATIENT
Start: 2024-08-21

## 2024-08-21 NOTE — TELEPHONE ENCOUNTER
Pt called and needs an updated prescriptions order for One Touch Vario test strips and one touch Delica plus lancets.     Please update order so pt can start to receive these prescriptions again. Pt called [employer] and this is the information they told pt that they needed. They have a hold on pts prescriptions. Pt has not been testing for at least a week now.    Please review and update

## 2024-10-24 ENCOUNTER — TELEPHONE (OUTPATIENT)
Age: 64
End: 2024-10-24

## 2024-10-24 DIAGNOSIS — E11.65 TYPE 2 DIABETES MELLITUS WITH HYPERGLYCEMIA, WITHOUT LONG-TERM CURRENT USE OF INSULIN (HCC): Primary | ICD-10-CM

## 2024-10-24 NOTE — TELEPHONE ENCOUNTER
Patient had called in and stated that he sees Dr. Barrera on November 6th, and was requesting for blood work to be placed, but it being the exact orders thatt Dr. Callaway order when he was his patient.     Please advise out to patient in regards to this.

## 2024-10-24 NOTE — TELEPHONE ENCOUNTER
Please call. Labs order for what I think is appropriate for his diagnosis, review of his chart, dr river's notes.   Anything else we can discuss at his visit.

## 2024-11-05 LAB
ALBUMIN SERPL-MCNC: 4.3 G/DL (ref 3.9–4.9)
ALBUMIN/CREAT UR: 37 MG/G CREAT (ref 0–29)
ALP SERPL-CCNC: 81 IU/L (ref 44–121)
ALT SERPL-CCNC: 19 IU/L (ref 0–44)
AST SERPL-CCNC: 14 IU/L (ref 0–40)
BILIRUB SERPL-MCNC: 0.4 MG/DL (ref 0–1.2)
BUN SERPL-MCNC: 21 MG/DL (ref 8–27)
BUN/CREAT SERPL: 16 (ref 10–24)
CALCIUM SERPL-MCNC: 9.4 MG/DL (ref 8.6–10.2)
CHLORIDE SERPL-SCNC: 104 MMOL/L (ref 96–106)
CHOLEST SERPL-MCNC: 171 MG/DL (ref 100–199)
CO2 SERPL-SCNC: 22 MMOL/L (ref 20–29)
CREAT SERPL-MCNC: 1.35 MG/DL (ref 0.76–1.27)
CREAT UR-MCNC: 150.1 MG/DL
EGFR: 58 ML/MIN/1.73
ERYTHROCYTE [DISTWIDTH] IN BLOOD BY AUTOMATED COUNT: 12.5 % (ref 11.6–15.4)
EST. AVERAGE GLUCOSE BLD GHB EST-MCNC: 177 MG/DL
GLOBULIN SER-MCNC: 2.7 G/DL (ref 1.5–4.5)
GLUCOSE SERPL-MCNC: 155 MG/DL (ref 70–99)
HBA1C MFR BLD: 7.8 % (ref 4.8–5.6)
HCT VFR BLD AUTO: 41.8 % (ref 37.5–51)
HDLC SERPL-MCNC: 34 MG/DL
HGB BLD-MCNC: 13.5 G/DL (ref 13–17.7)
LDLC SERPL CALC-MCNC: 98 MG/DL (ref 0–99)
LDLC/HDLC SERPL: 2.9 RATIO (ref 0–3.6)
MCH RBC QN AUTO: 29 PG (ref 26.6–33)
MCHC RBC AUTO-ENTMCNC: 32.3 G/DL (ref 31.5–35.7)
MCV RBC AUTO: 90 FL (ref 79–97)
MICROALBUMIN UR-MCNC: 56.1 UG/ML
PLATELET # BLD AUTO: 277 X10E3/UL (ref 150–450)
POTASSIUM SERPL-SCNC: 4.6 MMOL/L (ref 3.5–5.2)
PROT SERPL-MCNC: 7 G/DL (ref 6–8.5)
RBC # BLD AUTO: 4.66 X10E6/UL (ref 4.14–5.8)
SL AMB VLDL CHOLESTEROL CALC: 39 MG/DL (ref 5–40)
SODIUM SERPL-SCNC: 137 MMOL/L (ref 134–144)
TRIGL SERPL-MCNC: 228 MG/DL (ref 0–149)
WBC # BLD AUTO: 8.1 X10E3/UL (ref 3.4–10.8)

## 2024-11-06 ENCOUNTER — OFFICE VISIT (OUTPATIENT)
Dept: FAMILY MEDICINE CLINIC | Facility: HOSPITAL | Age: 64
End: 2024-11-06
Payer: COMMERCIAL

## 2024-11-06 VITALS
TEMPERATURE: 97.8 F | SYSTOLIC BLOOD PRESSURE: 122 MMHG | HEIGHT: 69 IN | OXYGEN SATURATION: 94 % | WEIGHT: 239.8 LBS | HEART RATE: 73 BPM | BODY MASS INDEX: 35.52 KG/M2 | DIASTOLIC BLOOD PRESSURE: 62 MMHG

## 2024-11-06 DIAGNOSIS — Z13.6 SCREENING FOR CARDIOVASCULAR CONDITION: ICD-10-CM

## 2024-11-06 DIAGNOSIS — I10 ESSENTIAL HYPERTENSION: ICD-10-CM

## 2024-11-06 DIAGNOSIS — G47.00 PERSISTENT INSOMNIA: ICD-10-CM

## 2024-11-06 DIAGNOSIS — E11.65 TYPE 2 DIABETES MELLITUS WITH HYPERGLYCEMIA, WITHOUT LONG-TERM CURRENT USE OF INSULIN (HCC): Primary | ICD-10-CM

## 2024-11-06 DIAGNOSIS — F51.05 INSOMNIA SECONDARY TO ANXIETY: ICD-10-CM

## 2024-11-06 DIAGNOSIS — N18.31 STAGE 3A CHRONIC KIDNEY DISEASE (HCC): ICD-10-CM

## 2024-11-06 DIAGNOSIS — F41.9 INSOMNIA SECONDARY TO ANXIETY: ICD-10-CM

## 2024-11-06 DIAGNOSIS — E11.22 TYPE 2 DIABETES MELLITUS WITH STAGE 3A CHRONIC KIDNEY DISEASE, WITHOUT LONG-TERM CURRENT USE OF INSULIN (HCC): ICD-10-CM

## 2024-11-06 DIAGNOSIS — E11.65 TYPE 2 DIABETES MELLITUS WITH HYPERGLYCEMIA, WITHOUT LONG-TERM CURRENT USE OF INSULIN (HCC): ICD-10-CM

## 2024-11-06 DIAGNOSIS — N18.31 TYPE 2 DIABETES MELLITUS WITH STAGE 3A CHRONIC KIDNEY DISEASE, WITHOUT LONG-TERM CURRENT USE OF INSULIN (HCC): ICD-10-CM

## 2024-11-06 DIAGNOSIS — E78.2 MIXED HYPERLIPIDEMIA: ICD-10-CM

## 2024-11-06 PROBLEM — H72.92: Status: RESOLVED | Noted: 2019-05-08 | Resolved: 2024-11-06

## 2024-11-06 PROBLEM — H65.92: Status: RESOLVED | Noted: 2019-05-08 | Resolved: 2024-11-06

## 2024-11-06 PROBLEM — K57.92 DIVERTICULITIS: Status: RESOLVED | Noted: 2018-09-28 | Resolved: 2024-11-06

## 2024-11-06 PROCEDURE — 99214 OFFICE O/P EST MOD 30 MIN: CPT | Performed by: FAMILY MEDICINE

## 2024-11-06 PROCEDURE — G0439 PPPS, SUBSEQ VISIT: HCPCS | Performed by: FAMILY MEDICINE

## 2024-11-06 RX ORDER — QUETIAPINE FUMARATE 100 MG/1
100 TABLET, FILM COATED ORAL
Status: SHIPPED
Start: 2024-11-06

## 2024-11-06 NOTE — ASSESSMENT & PLAN NOTE
Uncontrolled diabetes mellitus.    Patient does have CKD 3A.  Metformin is acceptable.  But I would not want to maximize the dose to avoid complications.    Lower metformin dose to 500 mg twice a day.    As he does have chronic kidney disease stage III.  I do think addition of Jardiance 10 mg would help his diabetes and CKD.    He is to call if Jardiance is not covered.    Continue with glimepiride at 2 mg twice a day.  Hope to be able to lower medication at some point.    Need to work on good dietary control and increase physical activity.    Repeat blood work in 3 months with follow-up visit.  Lab Results   Component Value Date    HGBA1C 7.8 (H) 11/01/2024       Orders:    Empagliflozin (Jardiance) 10 MG TABS tablet; Take 1 tablet (10 mg total) by mouth in the morning    Comprehensive metabolic panel; Future    Hemoglobin A1C; Future    Lipid Panel with Direct LDL reflex; Future    Comprehensive metabolic panel    Hemoglobin A1C    Lipid Panel with Direct LDL reflex    metFORMIN (GLUCOPHAGE) 500 mg tablet; Take 1 tablet (500 mg total) by mouth 2 (two) times a day with meals TAKE 1 TABLET WITH BREAKFAST TAKE 2 TABLETS WITH EVENING MEAL    CT coronary calcium score; Future

## 2024-11-06 NOTE — PATIENT INSTRUCTIONS
Medicare Preventive Visit Patient Instructions  Thank you for completing your Welcome to Medicare Visit or Medicare Annual Wellness Visit today. Your next wellness visit will be due in one year (11/7/2025).  The screening/preventive services that you may require over the next 5-10 years are detailed below. Some tests may not apply to you based off risk factors and/or age. Screening tests ordered at today's visit but not completed yet may show as past due. Also, please note that scanned in results may not display below.  Preventive Screenings:  Service Recommendations Previous Testing/Comments   Colorectal Cancer Screening  Colonoscopy    Fecal Occult Blood Test (FOBT)/Fecal Immunochemical Test (FIT)  Fecal DNA/Cologuard Test  Flexible Sigmoidoscopy Age: 45-75 years old   Colonoscopy: every 10 years (May be performed more frequently if at higher risk)  OR  FOBT/FIT: every 1 year  OR  Cologuard: every 3 years  OR  Sigmoidoscopy: every 5 years  Screening may be recommended earlier than age 45 if at higher risk for colorectal cancer. Also, an individualized decision between you and your healthcare provider will decide whether screening between the ages of 76-85 would be appropriate. Colonoscopy: 10/04/2022  FOBT/FIT: Not on file  Cologuard: Not on file  Sigmoidoscopy: Not on file    Screening Current     Prostate Cancer Screening Individualized decision between patient and health care provider in men between ages of 55-69   Medicare will cover every 12 months beginning on the day after your 50th birthday PSA: 2.8 ng/mL     Screening Current     Hepatitis C Screening Once for adults born between 1945 and 1965  More frequently in patients at high risk for Hepatitis C Hep C Antibody: 09/12/2018    Screening Current   Diabetes Screening 1-2 times per year if you're at risk for diabetes or have pre-diabetes Fasting glucose: No results in last 5 years (No results in last 5 years)  A1C: 7.8 % (11/1/2024)  Screening Not  Indicated  History Diabetes   Cholesterol Screening Once every 5 years if you don't have a lipid disorder. May order more often based on risk factors. Lipid panel: 11/01/2024  Screening Not Indicated  History Lipid Disorder      Other Preventive Screenings Covered by Medicare:  Abdominal Aortic Aneurysm (AAA) Screening: covered once if your at risk. You're considered to be at risk if you have a family history of AAA or a male between the age of 65-75 who smoking at least 100 cigarettes in your lifetime.  Lung Cancer Screening: covers low dose CT scan once per year if you meet all of the following conditions: (1) Age 55-77; (2) No signs or symptoms of lung cancer; (3) Current smoker or have quit smoking within the last 15 years; (4) You have a tobacco smoking history of at least 20 pack years (packs per day x number of years you smoked); (5) You get a written order from a healthcare provider.  Glaucoma Screening: covered annually if you're considered high risk: (1) You have diabetes OR (2) Family history of glaucoma OR (3)  aged 50 and older OR (4)  American aged 65 and older  Osteoporosis Screening: covered every 2 years if you meet one of the following conditions: (1) Have a vertebral abnormality; (2) On glucocorticoid therapy for more than 3 months; (3) Have primary hyperparathyroidism; (4) On osteoporosis medications and need to assess response to drug therapy.  HIV Screening: covered annually if you're between the age of 15-65. Also covered annually if you are younger than 15 and older than 65 with risk factors for HIV infection. For pregnant patients, it is covered up to 3 times per pregnancy.    Immunizations:  Immunization Recommendations   Influenza Vaccine Annual influenza vaccination during flu season is recommended for all persons aged >= 6 months who do not have contraindications   Pneumococcal Vaccine   * Pneumococcal conjugate vaccine = PCV13 (Prevnar 13), PCV15 (Vaxneuvance),  PCV20 (Prevnar 20)  * Pneumococcal polysaccharide vaccine = PPSV23 (Pneumovax) Adults 19-65 yo with certain risk factors or if 65+ yo  If never received any pneumonia vaccine: recommend Prevnar 20 (PCV20)  Give PCV20 if previously received 1 dose of PCV13 or PPSV23   Hepatitis B Vaccine 3 dose series if at intermediate or high risk (ex: diabetes, end stage renal disease, liver disease)   Respiratory syncytial virus (RSV) Vaccine - COVERED BY MEDICARE PART D  * RSVPreF3 (Arexvy) CDC recommends that adults 60 years of age and older may receive a single dose of RSV vaccine using shared clinical decision-making (SCDM)   Tetanus (Td) Vaccine - COST NOT COVERED BY MEDICARE PART B Following completion of primary series, a booster dose should be given every 10 years to maintain immunity against tetanus. Td may also be given as tetanus wound prophylaxis.   Tdap Vaccine - COST NOT COVERED BY MEDICARE PART B Recommended at least once for all adults. For pregnant patients, recommended with each pregnancy.   Shingles Vaccine (Shingrix) - COST NOT COVERED BY MEDICARE PART B  2 shot series recommended in those 19 years and older who have or will have weakened immune systems or those 50 years and older     Health Maintenance Due:      Topic Date Due   • Colorectal Cancer Screening  10/03/2027   • Hepatitis C Screening  Completed     Immunizations Due:  There are no preventive care reminders to display for this patient.  Advance Directives   What are advance directives?  Advance directives are legal documents that state your wishes and plans for medical care. These plans are made ahead of time in case you lose your ability to make decisions for yourself. Advance directives can apply to any medical decision, such as the treatments you want, and if you want to donate organs.   What are the types of advance directives?  There are many types of advance directives, and each state has rules about how to use them. You may choose a  combination of any of the following:  Living will:  This is a written record of the treatment you want. You can also choose which treatments you do not want, which to limit, and which to stop at a certain time. This includes surgery, medicine, IV fluid, and tube feedings.   Durable power of  for healthcare (DPAHC):  This is a written record that states who you want to make healthcare choices for you when you are unable to make them for yourself. This person, called a proxy, is usually a family member or a friend. You may choose more than 1 proxy.  Do not resuscitate (DNR) order:  A DNR order is used in case your heart stops beating or you stop breathing. It is a request not to have certain forms of treatment, such as CPR. A DNR order may be included in other types of advance directives.  Medical directive:  This covers the care that you want if you are in a coma, near death, or unable to make decisions for yourself. You can list the treatments you want for each condition. Treatment may include pain medicine, surgery, blood transfusions, dialysis, IV or tube feedings, and a ventilator (breathing machine).  Values history:  This document has questions about your views, beliefs, and how you feel and think about life. This information can help others choose the care that you would choose.  Why are advance directives important?  An advance directive helps you control your care. Although spoken wishes may be used, it is better to have your wishes written down. Spoken wishes can be misunderstood, or not followed. Treatments may be given even if you do not want them. An advance directive may make it easier for your family to make difficult choices about your care.   Weight Management   Why it is important to manage your weight:  Being overweight increases your risk of health conditions such as heart disease, high blood pressure, type 2 diabetes, and certain types of cancer. It can also increase your risk for  osteoarthritis, sleep apnea, and other respiratory problems. Aim for a slow, steady weight loss. Even a small amount of weight loss can lower your risk of health problems.  How to lose weight safely:  A safe and healthy way to lose weight is to eat fewer calories and get regular exercise. You can lose up about 1 pound a week by decreasing the number of calories you eat by 500 calories each day.   Healthy meal plan for weight management:  A healthy meal plan includes a variety of foods, contains fewer calories, and helps you stay healthy. A healthy meal plan includes the following:  Eat whole-grain foods more often.  A healthy meal plan should contain fiber. Fiber is the part of grains, fruits, and vegetables that is not broken down by your body. Whole-grain foods are healthy and provide extra fiber in your diet. Some examples of whole-grain foods are whole-wheat breads and pastas, oatmeal, brown rice, and bulgur.  Eat a variety of vegetables every day.  Include dark, leafy greens such as spinach, kale, manisha greens, and mustard greens. Eat yellow and orange vegetables such as carrots, sweet potatoes, and winter squash.   Eat a variety of fruits every day.  Choose fresh or canned fruit (canned in its own juice or light syrup) instead of juice. Fruit juice has very little or no fiber.  Eat low-fat dairy foods.  Drink fat-free (skim) milk or 1% milk. Eat fat-free yogurt and low-fat cottage cheese. Try low-fat cheeses such as mozzarella and other reduced-fat cheeses.  Choose meat and other protein foods that are low in fat.  Choose beans or other legumes such as split peas or lentils. Choose fish, skinless poultry (chicken or turkey), or lean cuts of red meat (beef or pork). Before you cook meat or poultry, cut off any visible fat.   Use less fat and oil.  Try baking foods instead of frying them. Add less fat, such as margarine, sour cream, regular salad dressing and mayonnaise to foods. Eat fewer high-fat foods. Some  examples of high-fat foods include french fries, doughnuts, ice cream, and cakes.  Eat fewer sweets.  Limit foods and drinks that are high in sugar. This includes candy, cookies, regular soda, and sweetened drinks.  Exercise:  Exercise at least 30 minutes per day on most days of the week. Some examples of exercise include walking, biking, dancing, and swimming. You can also fit in more physical activity by taking the stairs instead of the elevator or parking farther away from stores. Ask your healthcare provider about the best exercise plan for you.      © Copyright [employer] 2018 Information is for End User's use only and may not be sold, redistributed or otherwise used for commercial purposes. All illustrations and images included in CareNotes® are the copyrighted property of [employer]M., Inc[employer] or [employer]

## 2024-11-06 NOTE — ASSESSMENT & PLAN NOTE
Orders:    QUEtiapine (SEROquel) 100 mg tablet; Take 1 tablet (100 mg total) by mouth daily at bedtime TAKE 1 AND 1/2 TABLETS DAILY BY MOUTH AT BEDTIME

## 2024-11-06 NOTE — ASSESSMENT & PLAN NOTE
Currently on statin.  Continue with the same.  Triglycerides are elevated likely due to elevated blood sugar.  LDL is acceptable.  Orders:    CT coronary calcium score; Future

## 2024-11-06 NOTE — ASSESSMENT & PLAN NOTE
Lab Results   Component Value Date    EGFR 58 (L) 11/01/2024    EGFR 61 08/08/2024    EGFR 55 (L) 02/22/2024    CREATININE 1.35 (H) 11/01/2024    CREATININE 1.29 (H) 08/08/2024    CREATININE 1.42 (H) 02/22/2024   As above continue to monitor.  Consider nephrology.  Start Jardiance as above.

## 2024-11-06 NOTE — PROGRESS NOTES
Ambulatory Visit  Name: Eugenio Miller      : 1960      MRN: [id number]  Encounter Provider: Rex Terrell MD  Encounter Date: 2024   Encounter department: [facility] [address]     Assessment & Plan  Type 2 diabetes mellitus with hyperglycemia, without long-term current use of insulin (HCC)  Uncontrolled diabetes mellitus.    Patient does have CKD 3A.  Metformin is acceptable.  But I would not want to maximize the dose to avoid complications.    Lower metformin dose to 500 mg twice a day.    As he does have chronic kidney disease stage III.  I do think addition of Jardiance 10 mg would help his diabetes and CKD.    He is to call if Jardiance is not covered.    Continue with glimepiride at 2 mg twice a day.  Hope to be able to lower medication at some point.    Need to work on good dietary control and increase physical activity.    Repeat blood work in 3 months with follow-up visit.  Lab Results   Component Value Date    HGBA1C 7.8 (H) 2024       Orders:    Empagliflozin (Jardiance) 10 MG TABS tablet; Take 1 tablet (10 mg total) by mouth in the morning    Comprehensive metabolic panel; Future    Hemoglobin A1C; Future    Lipid Panel with Direct LDL reflex; Future    Comprehensive metabolic panel    Hemoglobin A1C    Lipid Panel with Direct LDL reflex    metFORMIN (GLUCOPHAGE) 500 mg tablet; Take 1 tablet (500 mg total) by mouth 2 (two) times a day with meals TAKE 1 TABLET WITH BREAKFAST TAKE 2 TABLETS WITH EVENING MEAL    CT coronary calcium score; Future    Mixed hyperlipidemia  Currently on statin.  Continue with the same.  Triglycerides are elevated likely due to elevated blood sugar.  LDL is acceptable.  Orders:    CT coronary calcium score; Future    Essential hypertension  Stable.  Continue on benazepril amlodipine.  Orders:    CT coronary calcium score; Future    Persistent insomnia  Has been on Seroquel at 150 mg nightly.  This was due to persistent and  resistant insomnia.  Has been on multiple agents with no improvement of his sleep.    Seroquel may be contributing to elevated blood sugar.  He will try Seroquel at 100 mg nightly.    Monitor.       Stage 3a chronic kidney disease (HCC)  Lab Results   Component Value Date    EGFR 58 (L) 11/01/2024    EGFR 61 08/08/2024    EGFR 55 (L) 02/22/2024    CREATININE 1.35 (H) 11/01/2024    CREATININE 1.29 (H) 08/08/2024    CREATININE 1.42 (H) 02/22/2024   As above continue to monitor.  Consider nephrology.  Start Jardiance as above.         Type 2 diabetes mellitus with stage 3a chronic kidney disease, without long-term current use of insulin ([facility])    Lab Results   Component Value Date    HGBA1C 7.8 (H) 11/01/2024            Type 2 diabetes mellitus with hyperglycemia, without long-term current use of insulin ([facility])    Lab Results   Component Value Date    HGBA1C 7.8 (H) 11/01/2024       Orders:    Empagliflozin (Jardiance) 10 MG TABS tablet; Take 1 tablet (10 mg total) by mouth in the morning    Comprehensive metabolic panel; Future    Hemoglobin A1C; Future    Lipid Panel with Direct LDL reflex; Future    Comprehensive metabolic panel    Hemoglobin A1C    Lipid Panel with Direct LDL reflex    metFORMIN (GLUCOPHAGE) 500 mg tablet; Take 1 tablet (500 mg total) by mouth 2 (two) times a day with meals TAKE 1 TABLET WITH BREAKFAST TAKE 2 TABLETS WITH EVENING MEAL    CT coronary calcium score; Future    Insomnia secondary to anxiety    Orders:    QUEtiapine (SEROquel) 100 mg tablet; Take 1 tablet (100 mg total) by mouth daily at bedtime TAKE 1 AND 1/2 TABLETS DAILY BY MOUTH AT BEDTIME    Screening for cardiovascular condition  Discussed benefits of obtaining CT coronary calcium score.  May cost $99 through our network.  This may lead into intensifying his cardiovascular risk factors or may lead us to further cardiac intervention.  He is agreeable to this.  Orders:    CT coronary calcium score; Future       Preventive health  issues were discussed with patient, and age appropriate screening tests were ordered as noted in patient's After Visit Summary. Personalized health advice and appropriate referrals for health education or preventive services given if needed, as noted in patient's After Visit Summary.    History of Present Illness     She had here for wellness visit and follow-up with diabetic care.  Overall feeling well.  Little frustrated with his last labs.  Has had an increase in his A1c.  Has had ongoing elevated creatinine.  He has known chronic kidney disease.       Patient Care Team:  Rex Terrell MD as PCP - General (Family Medicine)  MD Denise Louise MD    Review of Systems   Constitutional: Negative.  Negative for activity change, appetite change, chills and diaphoresis.   HENT:  Negative for congestion and dental problem.    Respiratory: Negative.  Negative for apnea, chest tightness, shortness of breath and wheezing.    Cardiovascular: Negative.  Negative for chest pain, palpitations and leg swelling.   Gastrointestinal: Negative.  Negative for abdominal distention, abdominal pain, constipation, diarrhea and nausea.   Genitourinary: Negative.  Negative for difficulty urinating, dysuria and frequency.     Medical History Reviewed by provider this encounter:  Tobacco  Allergies  Meds  Problems  Med Hx  Surg Hx  Fam Hx       Annual Wellness Visit Questionnaire   Eugenio is here for his Subsequent Wellness visit.     Health Risk Assessment:   Patient rates overall health as fair. Patient feels that their physical health rating is slightly worse. Patient is satisfied with their life. Eyesight was rated as slightly worse. Hearing was rated as same. Patient feels that their emotional and mental health rating is same. Patients states they are sometimes angry. Patient states they are never, rarely unusually tired/fatigued. Pain experienced in the last 7 days has been none. Patient states that he  has experienced no weight loss or gain in last 6 months.     Depression Screening:   PHQ-9 Score: 2      Fall Risk Screening:   In the past year, patient has experienced: no history of falling in past year      Home Safety:  Patient does not have trouble with stairs inside or outside of their home. Patient has working smoke alarms and has working carbon monoxide detector. Home safety hazards include: none.     Nutrition:   Current diet is Diabetic and Unhealthy.     Medications:   Patient is currently taking over-the-counter supplements. OTC medications include: see medication list. Patient is able to manage medications.     Activities of Daily Living (ADLs)/Instrumental Activities of Daily Living (IADLs):   Walk and transfer into and out of bed and chair?: Yes  Dress and groom yourself?: Yes    Bathe or shower yourself?: Yes    Feed yourself? Yes  Do your laundry/housekeeping?: Yes  Manage your money, pay your bills and track your expenses?: Yes  Make your own meals?: Yes    Do your own shopping?: Yes    Previous Hospitalizations:   Any hospitalizations or ED visits within the last 12 months?: No      Advance Care Planning:   Living will: Yes    Durable POA for healthcare: Yes    Advanced directive: Yes      PREVENTIVE SCREENINGS      Cardiovascular Screening:    General: Screening Not Indicated and History Lipid Disorder      Diabetes Screening:     General: Screening Not Indicated and History Diabetes      Colorectal Cancer Screening:     General: Screening Current      Prostate Cancer Screening:    General: Screening Current      Osteoporosis Screening:    General: Screening Not Indicated      Abdominal Aortic Aneurysm (AAA) Screening:    Risk factors include: age between 65-74 yo        General: Screening Not Indicated      Lung Cancer Screening:     General: Screening Not Indicated      Hepatitis C Screening:    General: Screening Current    Screening, Brief Intervention, and Referral to Treatment  "(SBIRT)    Screening      Single Item Drug Screening:  How often have you used an illegal drug (including marijuana) or a prescription medication for non-medical reasons in the past year? never    Single Item Drug Screen Score: 0  Interpretation: Negative screen for possible drug use disorder    Social Determinants of Health     Financial Resource Strain: Low Risk  (10/24/2023)    Overall Financial Resource Strain (CARDIA)     Difficulty of Paying Living Expenses: Not very hard   Food Insecurity: No Food Insecurity (11/6/2024)    Hunger Vital Sign     Worried About Running Out of Food in the Last Year: Never true     Ran Out of Food in the Last Year: Never true   Transportation Needs: No Transportation Needs (11/6/2024)    PRAPARE - Transportation     Lack of Transportation (Medical): No     Lack of Transportation (Non-Medical): No   Housing Stability: Low Risk  (11/6/2024)    Housing Stability Vital Sign     Unable to Pay for Housing in the Last Year: No     Number of Times Moved in the Last Year: 0     Homeless in the Last Year: No   Utilities: Not At Risk (11/6/2024)    [facility] Utilities     Threatened with loss of utilities: No     Vision Screening    Right eye Left eye Both eyes   Without correction 20/30  20/20   With correction          Objective     /62   Pulse 73   Temp 97.8 °F (36.6 °C)   Ht 5' 8.5\" (1.74 m) Comment: with shoes on  Wt 109 kg (239 lb 12.8 oz)   SpO2 94%   BMI 35.93 kg/m²     Physical Exam  Vitals reviewed.   Constitutional:       General: He is not in acute distress.     Appearance: He is well-developed. He is not ill-appearing.   HENT:      Head: Normocephalic and atraumatic.      Right Ear: Tympanic membrane, ear canal and external ear normal.      Left Ear: Tympanic membrane, ear canal and external ear normal.      Mouth/Throat:      Mouth: Mucous membranes are moist.      Pharynx: Oropharynx is clear.   Eyes:      Extraocular Movements: Extraocular movements intact.      " Conjunctiva/sclera: Conjunctivae normal.      Pupils: Pupils are equal, round, and reactive to light.   Cardiovascular:      Rate and Rhythm: Normal rate and regular rhythm.      Heart sounds: Normal heart sounds. No murmur heard.  Pulmonary:      Effort: Pulmonary effort is normal.      Breath sounds: Normal breath sounds.   Abdominal:      General: Bowel sounds are normal. There is no distension.      Palpations: Abdomen is soft. There is no mass.      Tenderness: There is no abdominal tenderness. There is no guarding.      Hernia: No hernia is present.   Musculoskeletal:         General: Normal range of motion.      Cervical back: Normal range of motion and neck supple.   Skin:     General: Skin is warm and dry.      Capillary Refill: Capillary refill takes less than 2 seconds.   Neurological:      General: No focal deficit present.      Mental Status: He is alert and oriented to person, place, and time.   Psychiatric:         Mood and Affect: Mood normal.         Behavior: Behavior normal.

## 2024-11-06 NOTE — ASSESSMENT & PLAN NOTE
Lab Results   Component Value Date    HGBA1C 7.8 (H) 11/01/2024       Orders:    Empagliflozin (Jardiance) 10 MG TABS tablet; Take 1 tablet (10 mg total) by mouth in the morning    Comprehensive metabolic panel; Future    Hemoglobin A1C; Future    Lipid Panel with Direct LDL reflex; Future    Comprehensive metabolic panel    Hemoglobin A1C    Lipid Panel with Direct LDL reflex    metFORMIN (GLUCOPHAGE) 500 mg tablet; Take 1 tablet (500 mg total) by mouth 2 (two) times a day with meals TAKE 1 TABLET WITH BREAKFAST TAKE 2 TABLETS WITH EVENING MEAL    CT coronary calcium score; Future

## 2024-11-06 NOTE — ASSESSMENT & PLAN NOTE
Has been on Seroquel at 150 mg nightly.  This was due to persistent and resistant insomnia.  Has been on multiple agents with no improvement of his sleep.    Seroquel may be contributing to elevated blood sugar.  He will try Seroquel at 100 mg nightly.    Monitor.

## 2024-11-10 ENCOUNTER — HOSPITAL ENCOUNTER (OUTPATIENT)
Dept: CT IMAGING | Facility: HOSPITAL | Age: 64
Discharge: HOME | End: 2024-11-10
Payer: COMMERCIAL

## 2024-11-10 DIAGNOSIS — E11.65 TYPE 2 DIABETES MELLITUS WITH HYPERGLYCEMIA, WITHOUT LONG-TERM CURRENT USE OF INSULIN (HCC): ICD-10-CM

## 2024-11-10 DIAGNOSIS — Z13.6 SCREENING FOR CARDIOVASCULAR CONDITION: ICD-10-CM

## 2024-11-10 DIAGNOSIS — E78.2 MIXED HYPERLIPIDEMIA: ICD-10-CM

## 2024-11-10 DIAGNOSIS — I10 ESSENTIAL HYPERTENSION: ICD-10-CM

## 2024-11-10 PROCEDURE — 75571 CT HRT W/O DYE W/CA TEST: CPT

## 2024-11-14 ENCOUNTER — TELEPHONE (OUTPATIENT)
Age: 64
End: 2024-11-14

## 2024-11-14 NOTE — TELEPHONE ENCOUNTER
Pt asking if the increase in Atorvastatin  is ok with all the other meds   pt mentioned kidney disease    please review    thanks

## 2024-11-14 NOTE — TELEPHONE ENCOUNTER
Please call. lease call.     Ct calcium scoring shows a score of 79.   This is relatively low but does show some clacification in the coronary arteries.     Recommend that his LDL be optimized to < 70. Recommend increase simvastatin to 80 mg daily.   If agreeable I will send in new rx.   Recommend non urgent visit with Cardiology for further opionion

## 2024-11-15 ENCOUNTER — TELEPHONE (OUTPATIENT)
Age: 64
End: 2024-11-15

## 2024-11-15 NOTE — TELEPHONE ENCOUNTER
Caller: Chaz Miller    Doctor: Nikolai    Reason for call: Pt saking for results on the CT Calcium score and would like to confirm if the simvastatin increasing from 40mg to 80mg as PCP is recommending is ok since he has kidney issues.    Call back#: [phone]

## 2024-11-18 DIAGNOSIS — E78.2 MIXED HYPERLIPIDEMIA: ICD-10-CM

## 2024-11-18 RX ORDER — ATORVASTATIN CALCIUM 40 MG/1
40 TABLET, FILM COATED ORAL DAILY
Qty: 100 TABLET | Refills: 3 | Status: SHIPPED | OUTPATIENT
Start: 2024-11-18

## 2024-11-20 ENCOUNTER — TELEPHONE (OUTPATIENT)
Age: 64
End: 2024-11-20

## 2024-11-20 NOTE — TELEPHONE ENCOUNTER
Patient calling in with concerns over medications. He states during his last visit with Dr. Barrera that he was supposed to go up to Simvastatin 80mg but there was a script sent for Atorvastatin 40mg instead.     He would like to know what the doctors thoughts on this. Is he supposed to have the Atorvastatin now or stick with Simvastatin?    Please call back to discuss. Thank you!

## 2024-11-21 NOTE — TELEPHONE ENCOUNTER
Please call.     I am sorry there was confusion. The two names, by error, were being used in the messages.       So he needs a more potent prescription and I had though we were talking about atorvastatin rather than going on higher dose of simvastatin.   There is higher drug interaction with the rest of his prescriptions with simvastatin compared to atorvastatin.   The atorvastatin is safer. At 40 mg daily it is equivalent to simvastatin 80 mg daily for cholesterol conttrol.     So I would recommend that he be on atorvastatin 40 mg daily.

## 2024-11-21 NOTE — TELEPHONE ENCOUNTER
Caller: Patient (Eugenio)    Doctor: Dr Menchaca    Reason for call: Patient had a CT Coronary Calcium score that was ordered by his PCP done on 11/10/2024. The patient is requesting that Dr Menchaca review the results, as well. He would like a call back. He did previously call on 11/15/2024.    Call back#: [phone]

## 2024-11-22 DIAGNOSIS — K21.00 GASTROESOPHAGEAL REFLUX DISEASE WITH ESOPHAGITIS: ICD-10-CM

## 2024-11-22 DIAGNOSIS — E11.65 TYPE 2 DIABETES MELLITUS WITH HYPERGLYCEMIA, WITHOUT LONG-TERM CURRENT USE OF INSULIN (HCC): ICD-10-CM

## 2024-11-22 DIAGNOSIS — F51.05 INSOMNIA SECONDARY TO ANXIETY: ICD-10-CM

## 2024-11-22 DIAGNOSIS — I10 ESSENTIAL HYPERTENSION: ICD-10-CM

## 2024-11-22 DIAGNOSIS — F41.9 INSOMNIA SECONDARY TO ANXIETY: ICD-10-CM

## 2024-11-22 RX ORDER — GLUCOSAM/CHON-MSM1/C/MANG/BOSW 500-416.6
TABLET ORAL
Qty: 100 EACH | Refills: 1 | Status: SHIPPED | OUTPATIENT
Start: 2024-11-22

## 2024-11-22 RX ORDER — AMLODIPINE AND BENAZEPRIL HYDROCHLORIDE 10; 40 MG/1; MG/1
1 CAPSULE ORAL DAILY
Qty: 90 CAPSULE | Refills: 1 | Status: SHIPPED | OUTPATIENT
Start: 2024-11-22

## 2024-11-22 RX ORDER — GLIMEPIRIDE 2 MG/1
2 TABLET ORAL 2 TIMES DAILY
Qty: 180 TABLET | Refills: 1 | Status: SHIPPED | OUTPATIENT
Start: 2024-11-22

## 2024-11-22 RX ORDER — METOPROLOL SUCCINATE 25 MG/1
25 TABLET, EXTENDED RELEASE ORAL DAILY
Qty: 90 TABLET | Refills: 1 | Status: SHIPPED | OUTPATIENT
Start: 2024-11-22

## 2024-11-22 RX ORDER — OMEPRAZOLE 40 MG/1
40 CAPSULE, DELAYED RELEASE ORAL DAILY
Qty: 90 CAPSULE | Refills: 1 | Status: SHIPPED | OUTPATIENT
Start: 2024-11-22

## 2024-11-22 RX ORDER — BLOOD SUGAR DIAGNOSTIC
STRIP MISCELLANEOUS
Qty: 100 STRIP | Refills: 1 | Status: SHIPPED | OUTPATIENT
Start: 2024-11-22

## 2024-11-22 NOTE — TELEPHONE ENCOUNTER
Patient requested refills of the following medications to [facility].  Amlodipine 10-40mg  Glimepiride 2mg  One Touch test strip  Metformin 500mg  Metoprolol 25mg  Omeprazole 40mg  Seroquel 100mg  True Plus Lancets

## 2024-11-25 DIAGNOSIS — F51.05 INSOMNIA SECONDARY TO ANXIETY: ICD-10-CM

## 2024-11-25 DIAGNOSIS — F41.9 INSOMNIA SECONDARY TO ANXIETY: ICD-10-CM

## 2024-11-25 RX ORDER — QUETIAPINE FUMARATE 100 MG/1
100 TABLET, FILM COATED ORAL
Qty: 90 TABLET | Refills: 0 | Status: SHIPPED | OUTPATIENT
Start: 2024-11-25 | End: 2024-11-26

## 2024-11-26 RX ORDER — QUETIAPINE FUMARATE 100 MG/1
100 TABLET, FILM COATED ORAL
Qty: 90 TABLET | Refills: 0 | Status: SHIPPED | OUTPATIENT
Start: 2024-11-26

## 2024-11-26 NOTE — TELEPHONE ENCOUNTER
Patient would like to know the status of refill request for QUEtiapine (SEROquel) 100 mg tablet       Please advise patient.

## 2024-12-04 ENCOUNTER — TELEPHONE (OUTPATIENT)
Dept: UROLOGY | Facility: CLINIC | Age: 64
End: 2024-12-04

## 2024-12-04 NOTE — TELEPHONE ENCOUNTER
Pt called back to double check date/time as he did not receive text notification of changed appointment,I did confirm

## 2024-12-05 NOTE — PROGRESS NOTES
Name: Eugenio Miller      : 1960      MRN: [id number]  Encounter Provider: Daniela Moncada PA-C  Encounter Date: 2024   Encounter department: [facility] UROLOGY [place]  :  Assessment & Plan  Bilateral renal cysts  Bilateral Bosniak type 1 renal cyst noted on CT May 2024  Patient remains asymptomatic at this time - twinges of pain intermittently   Urinating normally, no symptoms of UTI or gross hematuria reported   Repeat ultrasound kidney/bladder annually for surveillance, order placed   Prefers to hold off on IR referral for cyst aspiration vs possible sclerosis   Follow up as needed     Orders:    US kidney and bladder; Future    BPH with obstruction/lower urinary tract symptoms  Content with voiding pattern  Mild urinary hesitancy   Currently does not take any mediations for his voiding pattern   Encouraged avoidance of bladder irritants / constipation   Continue to monitor on an annual basis     Stage 3a chronic kidney disease (HCC)  Lab Results   Component Value Date    EGFR 58 (L) 2024    EGFR 61 2024    EGFR 55 (L) 2024    CREATININE 1.35 (H) 2024    CREATININE 1.29 (H) 2024    CREATININE 1.42 (H) 2024   Creatinine remains overall stable   Has increased his water intake to improve overall kidney function  Continue to monitor         History of Present Illness   Eugenio Miller is a 64 y.o. male who presents for 6-month follow-up to rediscuss his bilateral Bosniak type I renal cyst noted on CT May 2024.  His renal cysts were incidentally found on imaging after the patient complained of some right-sided flank pain.  At that time, we offered referral to interventional radiology for cyst aspiration versus possible sclerosis.  Within the last 6 months, the patient has felt well overall.  He reports ongoing right sided flank pain intermittently but it is more of an ache and may be musculoskeletal in nature.  His kidney function remains  overall stable, he has known CKD stage III.  Recommend annual renal ultrasounds for ongoing surveillance.  No other intervention is needed at this time.  When it comes to his urinary pattern, he currently does not take any medication.  He is overall content and reports mild urinary hesitancy but denies dysuria, hematuria, urinary frequency/urgency, incomplete emptying, or pain in the bladder.  Depending on repeat ultrasound results, plan to follow-up on an as-needed basis.  Patient is agreeable plan and will reach out to us with any new onset symptoms/pain.    Review of Systems   Constitutional:  Negative for activity change, chills, fatigue and fever.   Respiratory:  Negative for apnea, cough and shortness of breath.    Cardiovascular:  Negative for chest pain and leg swelling.   Gastrointestinal:  Negative for abdominal distention, abdominal pain, constipation, diarrhea, nausea and vomiting.   Genitourinary:  Negative for decreased urine volume, difficulty urinating, dysuria, flank pain, frequency, hematuria, penile pain, testicular pain and urgency.   Neurological:  Negative for dizziness and headaches.   Psychiatric/Behavioral: Negative.       Medical History Reviewed by provider this encounter:  Tobacco  Allergies  Meds  Problems  Med Hx  Surg Hx  Fam Hx     .  Current Outpatient Medications on File Prior to Visit   Medication Sig Dispense Refill    amLODIPine-benazepril (LOTREL) 10-40 MG per capsule Take 1 capsule by mouth daily 90 capsule 1    atorvastatin (LIPITOR) 40 mg tablet Take 1 tablet (40 mg total) by mouth daily 100 tablet 3    Empagliflozin (Jardiance) 10 MG TABS tablet Take 1 tablet (10 mg total) by mouth in the morning 90 tablet 1    glimepiride (AMARYL) 2 mg tablet Take 1 tablet (2 mg total) by mouth 2 (two) times a day 180 tablet 1    glucose blood (OneTouch Verio) test strip USE TO TEST ONCE DAILY E11.65 100 strip 1    metFORMIN (GLUCOPHAGE) 500 mg tablet Take 1 tablet (500 mg total) by  mouth 2 (two) times a day with meals TAKE 1 TABLET WITH BREAKFAST TAKE 2 TABLETS WITH EVENING MEAL 180 tablet 1    metoprolol succinate (TOPROL-XL) 25 mg 24 hr tablet Take 1 tablet (25 mg total) by mouth daily 90 tablet 1    omeprazole (PriLOSEC) 40 MG capsule Take 1 capsule (40 mg total) by mouth daily 90 capsule 1    QUEtiapine (SEROquel) 100 mg tablet Take 1 tablet (100 mg total) by mouth daily at bedtime 90 tablet 0    TRUEplus Lancets 30G MISC TEST BLOOD SUGAR ONE TIME PER DAY. 100 each 1    Blood Glucose Monitoring Suppl (OneTouch Verio) w/Device KIT Use in the morning (Patient not taking: Reported on 11/6/2024) 1 kit 0    Blood Glucose Monitoring Suppl (True Metrix Air Glucose Meter) w/Device KIT Use 2 (two) times a day (Patient not taking: Reported on 11/6/2024) 1 kit 0    glucose blood (OneTouch Verio) test strip TEST BLOOD SUGAR EVERY MORNING (Patient not taking: Reported on 11/6/2024) 100 strip 1    [DISCONTINUED] polyethylene glycol (Golytely) 4000 mL solution Take 4,000 mL by mouth once for 1 dose Take 4000 mL by mouth once for 1 dose. Use as directed 4000 mL 0     No current facility-administered medications on file prior to visit.      Social History     Tobacco Use    Smoking status: Former    Smokeless tobacco: Never    Tobacco comments:     History of current every day smoker   Vaping Use    Vaping status: Never Used   Substance and Sexual Activity    Alcohol use: Yes     Comment: Social/occasional     Drug use: No    Sexual activity: Yes     Partners: Female        Objective   There were no vitals taken for this visit.    Physical Exam  Vitals and nursing note reviewed.   Constitutional:       General: He is not in acute distress.     Appearance: Normal appearance. He is well-developed. He is obese. He is not ill-appearing.   HENT:      Head: Normocephalic and atraumatic.   Eyes:      Conjunctiva/sclera: Conjunctivae normal.   Cardiovascular:      Rate and Rhythm: Normal rate and regular rhythm.       Heart sounds: No murmur heard.  Pulmonary:      Effort: Pulmonary effort is normal. No respiratory distress.      Breath sounds: Normal breath sounds.   Abdominal:      General: Abdomen is flat. There is no distension.      Palpations: Abdomen is soft.      Tenderness: There is no abdominal tenderness. There is no right CVA tenderness or left CVA tenderness.   Musculoskeletal:         General: No swelling.      Cervical back: Neck supple.   Skin:     General: Skin is warm and dry.      Capillary Refill: Capillary refill takes less than 2 seconds.   Neurological:      Mental Status: He is alert.   Psychiatric:         Mood and Affect: Mood normal.          Results  Lab Results   Component Value Date    PSA 2.8 02/22/2024    PSA 2.6 12/29/2022    PSA 2.3 12/13/2021     Lab Results   Component Value Date    GLUCOSE 124 (H) 11/21/2017    CALCIUM 9.6 11/21/2017     11/21/2017    K 4.6 11/01/2024    CO2 22 11/01/2024     11/01/2024    BUN 21 11/01/2024    CREATININE 1.35 (H) 11/01/2024     Lab Results   Component Value Date    WBC 8.1 11/01/2024    HGB 13.5 11/01/2024    HCT 41.8 11/01/2024    MCV 90 11/01/2024     11/01/2024       Office Urine Dip  No results found for this or any previous visit (from the past hour).]    Administrative Statements   I have spent a total time of 27 minutes in caring for this patient on the day of the visit/encounter including Diagnostic results, Prognosis, Risks and benefits of tx options, Instructions for management, Patient and family education, Importance of tx compliance, Impressions, Counseling / Coordination of care, Documenting in the medical record, Reviewing / ordering tests, medicine, procedures  , and Obtaining or reviewing history  . Topics discussed with the patient / family include symptom assessment and management, goals of care, supportive listening, and anticipatory guidance.

## 2024-12-09 ENCOUNTER — OFFICE VISIT (OUTPATIENT)
Dept: UROLOGY | Facility: HOSPITAL | Age: 64
End: 2024-12-09
Payer: COMMERCIAL

## 2024-12-09 VITALS
HEIGHT: 69 IN | HEART RATE: 66 BPM | SYSTOLIC BLOOD PRESSURE: 124 MMHG | DIASTOLIC BLOOD PRESSURE: 82 MMHG | WEIGHT: 234 LBS | OXYGEN SATURATION: 99 % | BODY MASS INDEX: 34.66 KG/M2

## 2024-12-09 DIAGNOSIS — N40.1 BPH WITH OBSTRUCTION/LOWER URINARY TRACT SYMPTOMS: ICD-10-CM

## 2024-12-09 DIAGNOSIS — N18.31 STAGE 3A CHRONIC KIDNEY DISEASE (HCC): ICD-10-CM

## 2024-12-09 DIAGNOSIS — N28.1 BILATERAL RENAL CYSTS: Primary | ICD-10-CM

## 2024-12-09 DIAGNOSIS — N13.8 BPH WITH OBSTRUCTION/LOWER URINARY TRACT SYMPTOMS: ICD-10-CM

## 2024-12-09 PROCEDURE — 99213 OFFICE O/P EST LOW 20 MIN: CPT

## 2024-12-09 NOTE — ASSESSMENT & PLAN NOTE
Lab Results   Component Value Date    EGFR 58 (L) 11/01/2024    EGFR 61 08/08/2024    EGFR 55 (L) 02/22/2024    CREATININE 1.35 (H) 11/01/2024    CREATININE 1.29 (H) 08/08/2024    CREATININE 1.42 (H) 02/22/2024   Creatinine remains overall stable   Has increased his water intake to improve overall kidney function  Continue to monitor

## 2024-12-09 NOTE — ASSESSMENT & PLAN NOTE
Bilateral Bosniak type 1 renal cyst noted on CT May 2024  Patient remains asymptomatic at this time - twinges of pain intermittently   Urinating normally, no symptoms of UTI or gross hematuria reported   Repeat ultrasound kidney/bladder annually for surveillance, order placed   Prefers to hold off on IR referral for cyst aspiration vs possible sclerosis   Follow up as needed     Orders:    US kidney and bladder; Future

## 2024-12-09 NOTE — ASSESSMENT & PLAN NOTE
Content with voiding pattern  Mild urinary hesitancy   Currently does not take any mediations for his voiding pattern   Encouraged avoidance of bladder irritants / constipation   Continue to monitor on an annual basis

## 2024-12-10 ENCOUNTER — OFFICE VISIT (OUTPATIENT)
Dept: FAMILY MEDICINE CLINIC | Facility: HOSPITAL | Age: 64
End: 2024-12-10
Payer: COMMERCIAL

## 2024-12-10 VITALS
OXYGEN SATURATION: 95 % | BODY MASS INDEX: 34.45 KG/M2 | DIASTOLIC BLOOD PRESSURE: 70 MMHG | HEIGHT: 69 IN | TEMPERATURE: 98.1 F | HEART RATE: 64 BPM | SYSTOLIC BLOOD PRESSURE: 118 MMHG | WEIGHT: 232.6 LBS

## 2024-12-10 DIAGNOSIS — K21.00 GASTROESOPHAGEAL REFLUX DISEASE WITH ESOPHAGITIS: ICD-10-CM

## 2024-12-10 PROCEDURE — G2211 COMPLEX E/M VISIT ADD ON: HCPCS

## 2024-12-10 PROCEDURE — 99214 OFFICE O/P EST MOD 30 MIN: CPT

## 2024-12-10 RX ORDER — OMEPRAZOLE 40 MG/1
40 CAPSULE, DELAYED RELEASE ORAL DAILY
Qty: 90 CAPSULE | Refills: 1 | Status: SHIPPED | OUTPATIENT
Start: 2024-12-10

## 2024-12-10 NOTE — PATIENT INSTRUCTIONS
"Patient Education     Acid reflux and GERD in adults   The Basics   Written by the doctors and editors at [facility]   What is acid reflux? -- Acid reflux is when the acid that is normally in the stomach backs up into the esophagus (figure 1). The esophagus is the tube that carries food from the mouth to the stomach.  When acid reflux causes bothersome symptoms or damage, doctors call it \"gastroesophageal reflux disease\" (\"GERD\").  What are the symptoms of acid reflux? -- The most common symptoms are:   Heartburn, which is a burning feeling in the chest   Regurgitation, which is when acid and undigested food flow back into your throat or mouth  Other symptoms might include:   Stomach or chest pain   Trouble swallowing   Raspy voice or sore throat   Unexplained cough   Nausea or vomiting  Is there anything I can do on my own to feel better? -- Yes. You might feel better if you:   Lose weight (if you are overweight).   Raise the head of your bed by 6 to 8 inches - You can do this by putting blocks of wood or rubber under 2 legs of the bed or a foam wedge under the mattress. It is not enough to sleep with your head raised on pillows.   Avoid foods that make your symptoms worse - For some people, these include coffee, chocolate, alcohol, peppermint, and fatty foods. It might help to write down what you ate before having reflux. This can help you figure out if a food is causing your problem.   Stop smoking, if you smoke. Your doctor or nurse can help you try to quit.   Avoid late meals - Lying down with a full stomach can make reflux worse. Try to plan meals for at least 2 to 3 hours before bedtime.   Avoid tight clothing - Some people feel better if they wear comfortable clothing that does not squeeze the stomach area.  How is acid reflux treated? -- There are a few main types of medicines that can help with the symptoms of acid reflux. The most common are antacids, histamine blockers, and proton pump inhibitors (table " "1). All of these medicines work by reducing or blocking stomach acid. But they each do that in a different way.   For mild symptoms, antacids can help, but they work only for a short time. Histamine blockers are stronger and last longer than antacids. You can buy antacids and most histamine blockers without a prescription.   For frequent and more severe symptoms, proton pump inhibitors are the most effective medicines. Some of these medicines are sold without a prescription. But there are other versions that your doctor can prescribe.  Sometimes, medicines cost less if you get them with a doctor's prescription. Other times, non-prescription medicines cost less. If you are worried about cost, ask your pharmacist about ways to pay less for your medicines.  When should I call the doctor? -- While pain or burning in the chest can be a symptom of acid reflux, it can also be a symptom of something more serious like a heart problem. Call for emergency help right away (in the [place] and [place], call 9-1-1) if you think that you might be having a heart attack.  Symptoms of a heart attack might include:   Severe chest pain, pressure, or discomfort with:   Breathing trouble, sweating, upset stomach, or cold and clammy skin   Pain in your arms, back, or jaw   Worse pain with activity like walking up stairs   Fast or irregular heartbeat   Feeling dizzy, faint, or weak  Some people can manage their acid reflux on their own by changing their habits or taking non-prescription medicines. Call your doctor for advice if:   Your symptoms are severe or last a long time.   You cannot seem to control your symptoms.   You have had symptoms for many years.  You should also see a doctor or nurse right away if you:   Have trouble swallowing, or feel as though food gets \"stuck\" on the way down   Lose weight when you are not trying to   Have chest pain   Choke when you eat   Vomit blood, or have bowel movements that are red, black, or look like " "tar  What if my child or teen has acid reflux? -- If your child or teen has acid reflux, take them to see a doctor or nurse. Do not give your child medicines to treat acid reflux without talking to a doctor or nurse.  In children, acid reflux can be caused by a number of problems. Have a doctor or nurse check for these problems before trying any treatments.  All topics are updated as new evidence becomes available and our peer review process is complete.  This topic retrieved from [employer] on: Mar 29, 2024.  Topic 12429 Version 15.0  Release: 32.2.4 - C32.87  © 2024 UpToDate, Inc. and/or its affiliates. All rights reserved.  figure 1: Acid reflux     Acid reflux is when the acid that is normally in the stomach backs up into the esophagus. This can happen if a muscle called the \"lower esophageal sphincter\" relaxes too much.  Graphic 527756 Version 1.0  table 1: Medicines that reduce or block stomach acid  Medicine type  Medicine name examples    Antacids* Calcium carbonate (sample brand names: Maalox, Tums)    Aluminum hydroxide, magnesium hydroxide, and simethicone (sample brand name: Mylanta)   Surface agents Sucralfate (brand name: Carafate)   Histamine blockers¶  Famotidine (brand name: Pepcid)    Cimetidine (brand name: Tagamet)   Proton pump inhibitors Omeprazole (brand name: Prilosec)    Esomeprazole (brand name: Nexium)    Pantoprazole (brand name: Protonix)    Lansoprazole (brand name: Prevacid)    Dexlansoprazole (brand name: Dexilant)    Rabeprazole (brand name: AcipHex)   * Some antacids contain aspirin, which can increase the risk of internal bleeding. Examples of antacids with aspirin include Janny-Hauppauge, Medi-Hauppauge, and Neutralin. But there are others, too, so it's important to check labels. Talk to your doctor or nurse before taking any medicines that contain aspirin.  ¶ Another histamine blocker, ranitidine (brand name: Zantac), stopped being sold in 2020. That's because it was found to sometimes " contain a substance that could increase a person's risk of cancer if they took a lot of it over time. If you have any of this medicine in your home, stop taking it and throw away any that is left over. Ask your doctor or nurse about what other medicine you should use instead.  Graphic 20655 Version 15.0  Consumer Information Use and Disclaimer   Disclaimer: This generalized information is a limited summary of diagnosis, treatment, and/or medication information. It is not meant to be comprehensive and should be used as a tool to help the user understand and/or assess potential diagnostic and treatment options. It does NOT include all information about conditions, treatments, medications, side effects, or risks that may apply to a specific patient. It is not intended to be medical advice or a substitute for the medical advice, diagnosis, or treatment of a health care provider based on the health care provider's examination and assessment of a patient's specific and unique circumstances. Patients must speak with a health care provider for complete information about their health, medical questions, and treatment options, including any risks or benefits regarding use of medications. This information does not endorse any treatments or medications as safe, effective, or approved for treating a specific patient. UpToDate, Inc. and its affiliates disclaim any warranty or liability relating to this information or the use thereof.The use of this information is governed by the Terms of Use, available at https://www.woltersc4cast.comuwer.com/en/know/clinical-effectiveness-terms. 2024© UpToDate, Inc. and its affiliates and/or licensors. All rights reserved.  Copyright   © 2024 UpToDate, Inc. and/or its affiliates. All rights reserved.

## 2024-12-10 NOTE — PROGRESS NOTES
"Name: Eugenio Miller      : 1960      MRN: [id number]  Encounter Provider: Sharon Trevino DO  Encounter Date: 12/10/2024   Encounter department: [facility] PRIMARY CARE [address]  :  Assessment & Plan  Gastroesophageal reflux disease with esophagitis  Chronic, uncontrolled  Suspect GERD flare  Current regimen: Prilosec 40 mg daily  Trial of increase to 40 mg twice daily omeprazole X 2 weeks  Will send refill  GERD-friendly diet info given   Orders:    omeprazole (PriLOSEC) 40 MG capsule; Take 1 capsule (40 mg total) by mouth daily           History of Present Illness     +n few months, ab pain few weeks, +epigastric pain +dizziness, faint, v  Lasting 30 min  Known gerd, on PPI currently  Not everyday  Has been continued on carbs, otherwise no change in diet  Eats some spicy food, but now watching  Breakfast: 1 cup coffee/day, not on empty stomach  Lunch: Hard boiled eggs  Dinner: Meat, vegetable  Bowel movements every day, but hard to go sometimes because of lack of vegetables in diet  Reports hard stool, but takes a softener  -hemoptysis or hematochexiz      Review of Systems   HENT:  Negative for trouble swallowing.    Gastrointestinal:  Positive for abdominal pain, constipation, nausea and vomiting. Negative for blood in stool.       Objective   /70   Pulse 64   Temp 98.1 °F (36.7 °C) (Tympanic)   Ht 5' 8.5\" (1.74 m)   Wt 106 kg (232 lb 9.6 oz)   SpO2 95%   BMI 34.85 kg/m²      Physical Exam  Constitutional:       General: He is not in acute distress.     Appearance: He is well-developed. He is obese. He is not ill-appearing.   HENT:      Head: Normocephalic and atraumatic.   Cardiovascular:      Rate and Rhythm: Normal rate and regular rhythm.      Heart sounds: Normal heart sounds.   Pulmonary:      Effort: Pulmonary effort is normal.      Breath sounds: Normal breath sounds.   Abdominal:      Tenderness: There is no abdominal tenderness.   Neurological:      Mental " Status: He is alert.   Psychiatric:         Mood and Affect: Mood normal.         Behavior: Behavior normal.           Sharon Trevino, DO  Family Medicine

## 2025-02-07 ENCOUNTER — RA CDI HCC (OUTPATIENT)
Dept: OTHER | Facility: HOSPITAL | Age: 65
End: 2025-02-07

## 2025-02-07 NOTE — PROGRESS NOTES
HCC coding opportunities          Chart Reviewed number of suggestions sent to Provider: 2  E11.36  E13.9     Patients Insurance     Medicare Insurance: [facility] Medicare Advantage

## 2025-02-12 LAB
ALBUMIN SERPL-MCNC: 4.5 G/DL (ref 3.9–4.9)
ALP SERPL-CCNC: 96 IU/L (ref 44–121)
ALT SERPL-CCNC: 22 IU/L (ref 0–44)
AST SERPL-CCNC: 20 IU/L (ref 0–40)
BILIRUB SERPL-MCNC: 0.6 MG/DL (ref 0–1.2)
BUN SERPL-MCNC: 21 MG/DL (ref 8–27)
BUN/CREAT SERPL: 14 (ref 10–24)
CALCIUM SERPL-MCNC: 9.7 MG/DL (ref 8.6–10.2)
CHLORIDE SERPL-SCNC: 102 MMOL/L (ref 96–106)
CHOLEST SERPL-MCNC: 167 MG/DL (ref 100–199)
CO2 SERPL-SCNC: 22 MMOL/L (ref 20–29)
CREAT SERPL-MCNC: 1.46 MG/DL (ref 0.76–1.27)
EGFR: 53 ML/MIN/1.73
EST. AVERAGE GLUCOSE BLD GHB EST-MCNC: 140 MG/DL
GLOBULIN SER-MCNC: 2.8 G/DL (ref 1.5–4.5)
GLUCOSE SERPL-MCNC: 127 MG/DL (ref 70–99)
HBA1C MFR BLD: 6.5 % (ref 4.8–5.6)
HDLC SERPL-MCNC: 36 MG/DL
LDLC SERPL CALC-MCNC: 98 MG/DL (ref 0–99)
LDLC/HDLC SERPL: 2.7 RATIO (ref 0–3.6)
POTASSIUM SERPL-SCNC: 4.4 MMOL/L (ref 3.5–5.2)
PROT SERPL-MCNC: 7.3 G/DL (ref 6–8.5)
SL AMB VLDL CHOLESTEROL CALC: 33 MG/DL (ref 5–40)
SODIUM SERPL-SCNC: 140 MMOL/L (ref 134–144)
TRIGL SERPL-MCNC: 192 MG/DL (ref 0–149)

## 2025-02-17 ENCOUNTER — OFFICE VISIT (OUTPATIENT)
Dept: FAMILY MEDICINE CLINIC | Facility: HOSPITAL | Age: 65
End: 2025-02-17
Payer: COMMERCIAL

## 2025-02-17 VITALS
SYSTOLIC BLOOD PRESSURE: 116 MMHG | BODY MASS INDEX: 34.6 KG/M2 | OXYGEN SATURATION: 94 % | DIASTOLIC BLOOD PRESSURE: 56 MMHG | HEIGHT: 69 IN | WEIGHT: 233.6 LBS | HEART RATE: 60 BPM

## 2025-02-17 DIAGNOSIS — N18.31 CHRONIC KIDNEY DISEASE, STAGE 3A (HCC): ICD-10-CM

## 2025-02-17 DIAGNOSIS — E11.22 TYPE 2 DIABETES MELLITUS WITH STAGE 3A CHRONIC KIDNEY DISEASE, WITHOUT LONG-TERM CURRENT USE OF INSULIN (HCC): ICD-10-CM

## 2025-02-17 DIAGNOSIS — E11.9 TYPE 2 DIABETES MELLITUS WITHOUT COMPLICATION, WITHOUT LONG-TERM CURRENT USE OF INSULIN (HCC): ICD-10-CM

## 2025-02-17 DIAGNOSIS — I10 ESSENTIAL HYPERTENSION: Primary | ICD-10-CM

## 2025-02-17 DIAGNOSIS — G47.00 INSOMNIA, UNSPECIFIED TYPE: ICD-10-CM

## 2025-02-17 DIAGNOSIS — N13.8 BPH WITH OBSTRUCTION/LOWER URINARY TRACT SYMPTOMS: ICD-10-CM

## 2025-02-17 DIAGNOSIS — N40.1 BPH WITH OBSTRUCTION/LOWER URINARY TRACT SYMPTOMS: ICD-10-CM

## 2025-02-17 DIAGNOSIS — E66.01 OBESITY, MORBID (HCC): ICD-10-CM

## 2025-02-17 DIAGNOSIS — N18.31 TYPE 2 DIABETES MELLITUS WITH STAGE 3A CHRONIC KIDNEY DISEASE, WITHOUT LONG-TERM CURRENT USE OF INSULIN (HCC): ICD-10-CM

## 2025-02-17 DIAGNOSIS — K40.20 NON-RECURRENT BILATERAL INGUINAL HERNIA WITHOUT OBSTRUCTION OR GANGRENE: ICD-10-CM

## 2025-02-17 PROCEDURE — 99214 OFFICE O/P EST MOD 30 MIN: CPT | Performed by: FAMILY MEDICINE

## 2025-02-17 RX ORDER — BLOOD SUGAR DIAGNOSTIC
STRIP MISCELLANEOUS
Qty: 100 STRIP | Refills: 3 | Status: SHIPPED | OUTPATIENT
Start: 2025-02-17

## 2025-02-17 RX ORDER — TRAZODONE HYDROCHLORIDE 50 MG/1
50 TABLET, FILM COATED ORAL
Qty: 100 TABLET | Refills: 0 | Status: SHIPPED | OUTPATIENT
Start: 2025-02-17

## 2025-02-17 NOTE — ASSESSMENT & PLAN NOTE
Lab Results   Component Value Date    HGBA1C 6.5 (H) 02/12/2025     Has had improved control of diabetes.  He is currently under control.  Continue with Jardiance at 10 mg daily.  Continue with Amaryl 2 mg twice a day.  Continue with metformin.    Continue with good dietary control and increase physical activity.  Repeat blood work in 6 months with follow-up visit.    Orders:    Albumin / creatinine urine ratio; Future    Comprehensive metabolic panel; Future    Hemoglobin A1C; Future    Lipid Panel with Direct LDL reflex; Future    glucose blood (OneTouch Verio) test strip; Check blood sugar once a day

## 2025-02-17 NOTE — ASSESSMENT & PLAN NOTE
Lab Results   Component Value Date    HGBA1C 6.5 (H) 02/12/2025   Stable.  Continue to monitor kidney function.  Discussed DM control and hypertension control total help worsening of kidney disease.  He is currently on an ACE inhibitor.  He is also now on Jardiance as well as continuing on a statin.    Try to lower Prilosec at next visit.

## 2025-02-17 NOTE — PROGRESS NOTES
Name: Eugenio Miller      : 1960      MRN: [id number]  Encounter Provider: Rex Terrell MD  Encounter Date: 2025   Encounter department: [facility] PRIMARY [facility] [address]   :  Assessment & Plan  Essential hypertension  Stable.  Continue to monitor.       BPH with obstruction/lower urinary tract symptoms  Currently without any issues with urination.  Monitor.       Type 2 diabetes mellitus without complication, without long-term current use of insulin (HCC)    Lab Results   Component Value Date    HGBA1C 6.5 (H) 2025     Has had improved control of diabetes.  He is currently under control.  Continue with Jardiance at 10 mg daily.  Continue with Amaryl 2 mg twice a day.  Continue with metformin.    Continue with good dietary control and increase physical activity.  Repeat blood work in 6 months with follow-up visit.    Orders:    Albumin / creatinine urine ratio; Future    Comprehensive metabolic panel; Future    Hemoglobin A1C; Future    Lipid Panel with Direct LDL reflex; Future    glucose blood (OneTouch Verio) test strip; Check blood sugar once a day    Insomnia, unspecified type  Trial of lowering Seroquel.  He will lower Seroquel to 50 mg nightly.  Will add trazodone 50 mg.  Hope to fully wean off Seroquel with addition of trazodone.  May need to increase trazodone.      Orders:    traZODone (DESYREL) 50 mg tablet; Take 1 tablet (50 mg total) by mouth daily at bedtime    Obesity, morbid (HCC)    Continue efforts with good dietary control and exercise.       Type 2 diabetes mellitus with stage 3a chronic kidney disease, without long-term current use of insulin (HCC)    Lab Results   Component Value Date    HGBA1C 6.5 (H) 2025   Stable.  Continue to monitor kidney function.  Discussed DM control and hypertension control total help worsening of kidney disease.  He is currently on an ACE inhibitor.  He is also now on Jardiance as well as continuing on a statin.    Try  to lower Prilosec at next visit.             Chronic kidney disease, stage 3a (HCC)  Lab Results   Component Value Date    EGFR 53 (L) 02/12/2025    EGFR 58 (L) 11/01/2024    EGFR 61 08/08/2024    CREATININE 1.46 (H) 02/12/2025    CREATININE 1.35 (H) 11/01/2024    CREATININE 1.29 (H) 08/08/2024            Non-recurrent bilateral inguinal hernia without obstruction or gangrene    Referral to general surgery.  Patient with bilateral hernia left side greater than the right.  Currently asymptomatic but referral to surgery to discuss treatment options.      Orders:    Ambulatory referral to General Surgery; Future           History of Present Illness   Eugenio is here for follow-up of chronic conditions.  Known diabetes hypertension hyperlipidemia.  Overall has been doing well.  Has been working on a low carbohydrate diet.  Has been doing well with this.  Last A1c done few days ago showing A1c is now down to 6.5.  No episodes of hypoglycemia.  Tolerating all medications well.    His main concern today is bulging in the inguinal area.  He is concerned about hernias.  He thinks he has hernias on both sides.  No significant pain.  No nausea vomiting diarrhea.  No urinary symptoms.  No Abdominal pain.  No inguinal pain.    Diabetes  Pertinent negatives for diabetes include no chest pain.     Review of Systems   Constitutional: Negative.  Negative for activity change, appetite change, chills and diaphoresis.   HENT:  Negative for congestion and dental problem.    Respiratory: Negative.  Negative for apnea, chest tightness, shortness of breath and wheezing.    Cardiovascular: Negative.  Negative for chest pain, palpitations and leg swelling.   Gastrointestinal: Negative.  Negative for abdominal distention, abdominal pain, constipation, diarrhea and nausea.   Genitourinary: Negative.  Negative for difficulty urinating, dysuria and frequency.       Objective   /56 (BP Location: Left arm, Patient Position: Sitting)   Pulse  "60   Ht 5' 8.5\" (1.74 m)   Wt 106 kg (233 lb 9.6 oz)   SpO2 94%   BMI 35.00 kg/m²      Physical Exam  Vitals reviewed.   Constitutional:       General: He is not in acute distress.     Appearance: He is well-developed. He is not ill-appearing.   HENT:      Head: Normocephalic and atraumatic.      Right Ear: Tympanic membrane, ear canal and external ear normal.      Left Ear: Tympanic membrane, ear canal and external ear normal.      Mouth/Throat:      Mouth: Mucous membranes are moist.      Pharynx: Oropharynx is clear.   Eyes:      Extraocular Movements: Extraocular movements intact.      Conjunctiva/sclera: Conjunctivae normal.      Pupils: Pupils are equal, round, and reactive to light.   Cardiovascular:      Rate and Rhythm: Normal rate and regular rhythm.      Pulses: no weak pulses.           Dorsalis pedis pulses are 2+ on the right side and 2+ on the left side.        Posterior tibial pulses are 2+ on the right side and 2+ on the left side.      Heart sounds: Normal heart sounds. No murmur heard.  Pulmonary:      Effort: Pulmonary effort is normal.      Breath sounds: Normal breath sounds.   Abdominal:      General: Bowel sounds are normal. There is no distension.      Palpations: Abdomen is soft. There is no mass.      Tenderness: There is no abdominal tenderness. There is no guarding.      Hernia: A hernia is present. Hernia is present in the left inguinal area and right inguinal area.   Musculoskeletal:         General: Normal range of motion.      Cervical back: Normal range of motion and neck supple.   Feet:      Right foot:      Skin integrity: No ulcer, skin breakdown, erythema, warmth, callus or dry skin.      Left foot:      Skin integrity: No ulcer, skin breakdown, erythema, warmth, callus or dry skin.   Skin:     General: Skin is warm and dry.      Capillary Refill: Capillary refill takes less than 2 seconds.   Neurological:      General: No focal deficit present.      Mental Status: He is " alert and oriented to person, place, and time.   Psychiatric:         Mood and Affect: Mood normal.         Behavior: Behavior normal.         Diabetic Foot Exam    Patient's shoes and socks removed.    Right Foot/Ankle   Right Foot Inspection  Skin Exam: skin normal and skin intact. No dry skin, no warmth, no callus, no erythema, no maceration, no abnormal color, no pre-ulcer, no ulcer and no callus.     Toe Exam: ROM and strength within normal limits.     Sensory   Vibration: intact  Proprioception: intact  Monofilament testing: intact    Vascular  Capillary refills: < 3 seconds  The right DP pulse is 2+. The right PT pulse is 2+.     Left Foot/Ankle  Left Foot Inspection  Skin Exam: skin normal and skin intact. No dry skin, no warmth, no erythema, no maceration, normal color, no pre-ulcer, no ulcer and no callus.     Toe Exam: ROM and strength within normal limits.     Sensory   Vibration: intact  Proprioception: intact  Monofilament testing: intact    Vascular  Capillary refills: < 3 seconds  The left DP pulse is 2+. The left PT pulse is 2+.     Assign Risk Category  No deformity present  No loss of protective sensation  No weak pulses  Risk: 0

## 2025-02-19 ENCOUNTER — TELEPHONE (OUTPATIENT)
Age: 65
End: 2025-02-19

## 2025-02-19 NOTE — TELEPHONE ENCOUNTER
Pt calling to verify if he has a follow up appt scheduled.     Informed patient that as per Daniela's note that he was to to repeat US annually (After June 9th 2025 and depending on results, plan is to follow up on an as-needed basis.

## 2025-02-20 ENCOUNTER — CONSULT (OUTPATIENT)
Dept: SURGERY | Facility: HOSPITAL | Age: 65
End: 2025-02-20
Payer: COMMERCIAL

## 2025-02-20 VITALS
DIASTOLIC BLOOD PRESSURE: 79 MMHG | OXYGEN SATURATION: 95 % | TEMPERATURE: 97.3 F | WEIGHT: 230 LBS | HEART RATE: 62 BPM | SYSTOLIC BLOOD PRESSURE: 131 MMHG | HEIGHT: 68 IN | BODY MASS INDEX: 34.86 KG/M2

## 2025-02-20 DIAGNOSIS — F41.9 INSOMNIA SECONDARY TO ANXIETY: ICD-10-CM

## 2025-02-20 DIAGNOSIS — K40.20 NON-RECURRENT BILATERAL INGUINAL HERNIA WITHOUT OBSTRUCTION OR GANGRENE: ICD-10-CM

## 2025-02-20 DIAGNOSIS — F51.05 INSOMNIA SECONDARY TO ANXIETY: ICD-10-CM

## 2025-02-20 PROCEDURE — 99203 OFFICE O/P NEW LOW 30 MIN: CPT | Performed by: SURGERY

## 2025-02-21 RX ORDER — QUETIAPINE FUMARATE 100 MG/1
100 TABLET, FILM COATED ORAL
Qty: 90 TABLET | Refills: 0 | Status: SHIPPED | OUTPATIENT
Start: 2025-02-21

## 2025-02-24 PROBLEM — K40.20 NON-RECURRENT BILATERAL INGUINAL HERNIA WITHOUT OBSTRUCTION OR GANGRENE: Status: ACTIVE | Noted: 2025-02-24

## 2025-02-24 NOTE — PROGRESS NOTES
Assessment/Plan:    Non-recurrent bilateral inguinal hernia without obstruction or gangrene  Bilateral inguinal hernia - discussed operative vs conservative mgt, surgical approaches, risks and benefits and patient has decided to proceed with laparoscopic bilateral inguinal hernia repair but wishes to think about timing. I will schedule at their earliest convenience.      Preoperative Clearance: None    HPI:  Eugenio Miller is a 64 y.o.male who was referred for evaluation of Inguinal Hernia (New patient bilateral inguinal referred by PCP)  .    Currently groin pain.     ROS:  General ROS: negative  negative for - chills, fatigue, fever or night sweats, weight loss  Respiratory ROS: no cough, shortness of breath, or wheezing  Cardiovascular ROS: no chest pain or dyspnea on exertion  Genito-Urinary ROS: no dysuria, trouble voiding, or hematuria  Musculoskeletal ROS: negative for - gait disturbance, joint pain or muscle pain  Neurological ROS: no TIA or stroke symptoms  Groin pain    [unfilled]  Patient has no known allergies.    Current Outpatient Medications:     amLODIPine-benazepril (LOTREL) 10-40 MG per capsule, Take 1 capsule by mouth daily, Disp: 90 capsule, Rfl: 1    atorvastatin (LIPITOR) 40 mg tablet, Take 1 tablet (40 mg total) by mouth daily, Disp: 100 tablet, Rfl: 3    Blood Glucose Monitoring Suppl (OneTouch Verio) w/Device KIT, Use in the morning, Disp: 1 kit, Rfl: 0    Blood Glucose Monitoring Suppl (True Metrix Air Glucose Meter) w/Device KIT, Use 2 (two) times a day, Disp: 1 kit, Rfl: 0    Empagliflozin (Jardiance) 10 MG TABS tablet, Take 1 tablet (10 mg total) by mouth in the morning, Disp: 90 tablet, Rfl: 1    glimepiride (AMARYL) 2 mg tablet, Take 1 tablet (2 mg total) by mouth 2 (two) times a day, Disp: 180 tablet, Rfl: 1    glucose blood (OneTouch Verio) test strip, Check blood sugar once a day, Disp: 100 strip, Rfl: 3    metFORMIN (GLUCOPHAGE) 500 mg tablet, Take 1 tablet (500 mg total) by  mouth 2 (two) times a day with meals TAKE 1 TABLET WITH BREAKFAST TAKE 2 TABLETS WITH EVENING MEAL, Disp: 180 tablet, Rfl: 1    metoprolol succinate (TOPROL-XL) 25 mg 24 hr tablet, Take 1 tablet (25 mg total) by mouth daily, Disp: 90 tablet, Rfl: 1    omeprazole (PriLOSEC) 40 MG capsule, Take 1 capsule (40 mg total) by mouth daily, Disp: 90 capsule, Rfl: 1    QUEtiapine (SEROquel) 100 mg tablet, Take 1 tablet (100 mg total) by mouth daily at bedtime, Disp: 90 tablet, Rfl: 0    traZODone (DESYREL) 50 mg tablet, Take 1 tablet (50 mg total) by mouth daily at bedtime, Disp: 100 tablet, Rfl: 0    TRUEplus Lancets 30G MISC, TEST BLOOD SUGAR ONE TIME PER DAY., Disp: 100 each, Rfl: 1  Past Medical History:   Diagnosis Date    Atrial premature complex     last assessed: 05/23/2012    Colon polyp     Depression 07/09/2012    Diabetes mellitus (HCC)     Diverticulitis     Diverticulitis 09/28/2018    GERD (gastroesophageal reflux disease)     Herpes simplex infection     resolved: 07/26/2017    Hyperlipidemia     Hypertension     Nicotine dependence     last assessed: 05/22/2014    Serous otitis media with rupture of tympanic membrane, left 05/08/2019    Umbilical hernia     last assessed: 07/30/2014     Past Surgical History:   Procedure Laterality Date    BOWEL RESECTION      COLON SIGMOID RESECTION LAPAROSCOPIC      onset: 07/22/2014;  partial-diverticulitis    COLONOSCOPY      October 2022: Healthy-appearing colorectal anastomosis, internal hemorrhoids, otherwise normal colonoscopy.  Previous colonoscopy with polyps.    UMBILICAL HERNIA REPAIR      onset: 07/22/2014    UPPER GASTROINTESTINAL ENDOSCOPY      October 2022: Irregular Z-line, otherwise normal EGD.  Biopsies negative for Herndon's, EOE, H. pylori.     Family History   Problem Relation Age of Onset    Depression Mother     Heart attack Father         acute    Depression Sister     Depression Sister     Colon cancer Neg Hx     Colon polyps Neg Hx       reports  "that he has quit smoking. He has never used smokeless tobacco. He reports current alcohol use. He reports that he does not use drugs.    Labs:   Lab Results   Component Value Date    WBC 8.1 11/01/2024    WBC 7.2 08/08/2024    WBC 7.4 06/22/2023    WBC 6.4 11/21/2017    WBC 8.2 12/07/2016    WBC 8.26 07/09/2014    HGB 13.5 11/01/2024    HGB 13.6 08/08/2024    HGB 13.0 06/22/2023    HGB 13.7 11/21/2017    HGB 14.2 12/07/2016    HGB 14.6 07/09/2014     11/01/2024     08/08/2024     06/22/2023     11/21/2017     12/07/2016     07/09/2014     Lab Results   Component Value Date    ALT 22 02/12/2025    ALT 19 11/01/2024    ALT 24 08/08/2024    AST 20 02/12/2025    AST 14 11/01/2024    AST 18 08/08/2024     This SmartLink has not been configured with any valid records.       PHYSICAL EXAM  General Appearance:    Alert, cooperative, no distress,    Head:    Normocephalic without obvious abnormality   Eyes:    PERRL, conjunctiva/corneas clear, EOM's intact        Neck:   Supple, no adenopathy, no JVD   Back:     Symmetric, no spinal or CVA tenderness   Lungs:     Clear to auscultation bilaterally, no wheezing or rhonchi   Heart:    Regular rate and rhythm, S1 and S2 normal, no murmur   Abdomen:     Soft +BS ND NT   Extremities:   Extremities normal. No clubbing, cyanosis or edema   Psych:   Normal Affect, AOx3.    Neurologic:  Skin:   CNII-XII intact. Strength symmetric, speech intact    Warm, dry, intact, no visible rashes or lesions         Physical Exam              Some portions of this record may have been generated with voice recognition software. There may be translation, syntax,  or grammatical errors. Occasional wrong word or \"sound-a-like\" substitutions may have occurred due to the inherent limitations of the voice recognition software. Read the chart carefully and recognize, using context, where substitutions may have occurred. If you have any questions, please contact the " dictating provider for clarification or correction, as needed. This encounter has been coded by a non-certified coder.

## 2025-02-24 NOTE — ASSESSMENT & PLAN NOTE
Bilateral inguinal hernia - discussed operative vs conservative mgt, surgical approaches, risks and benefits and patient has decided to proceed with laparoscopic bilateral inguinal hernia repair but wishes to think about timing. I will schedule at their earliest convenience.

## 2025-04-01 ENCOUNTER — TELEPHONE (OUTPATIENT)
Age: 65
End: 2025-04-01

## 2025-04-01 DIAGNOSIS — M17.11 PRIMARY OSTEOARTHRITIS OF RIGHT KNEE: Primary | ICD-10-CM

## 2025-04-01 NOTE — TELEPHONE ENCOUNTER
Caller: Patient   Doctor/office: Dr Yang  CB#: [phone]      Chaz called to start auth/delivery for VISCO(Gel) injections.    Body Part: rt knee  Pain Level (scale 1-10): 8  Date of last Gel Injection: 10/19/22  Did the last injection work well for you? yes      Educated patient on Visco procedure. Auth team will review insurance and process the request appropriately. Patient will be contacted if the have any questions and when ready to schedule.

## 2025-04-01 NOTE — TELEPHONE ENCOUNTER
Pt called asking for a referral and recommendation from Dr Barrera for a provider so he can get his knee Jel injections. [facility] [place] and he has not gotten the injection in about a year. Please advise and notify pt

## 2025-04-02 DIAGNOSIS — M25.561 CHRONIC PAIN OF RIGHT KNEE: Primary | ICD-10-CM

## 2025-04-02 DIAGNOSIS — G89.29 CHRONIC PAIN OF RIGHT KNEE: Primary | ICD-10-CM

## 2025-04-09 ENCOUNTER — PROCEDURE VISIT (OUTPATIENT)
Dept: OBGYN CLINIC | Facility: MEDICAL CENTER | Age: 65
End: 2025-04-09
Payer: COMMERCIAL

## 2025-04-09 VITALS — HEIGHT: 68 IN | BODY MASS INDEX: 34.56 KG/M2 | WEIGHT: 228 LBS

## 2025-04-09 DIAGNOSIS — M17.11 PRIMARY OSTEOARTHRITIS OF RIGHT KNEE: Primary | ICD-10-CM

## 2025-04-09 PROCEDURE — 20610 DRAIN/INJ JOINT/BURSA W/O US: CPT | Performed by: ORTHOPAEDIC SURGERY

## 2025-04-09 NOTE — PROGRESS NOTES
"Large joint arthrocentesis: R knee  Universal Protocol:  Consent given by: patient  Time out: Immediately prior to procedure a \"time out\" was called to verify the correct patient, procedure, equipment, support staff and site/side marked as required.  Site marked: the operative site was marked  Supporting Documentation  Indications: pain and joint swelling   Procedure Details  Location: knee - R knee  Preparation: Patient was prepped and draped in the usual sterile fashion  Needle size: 22 G  Ultrasound guidance: no  Approach: anterolateral  Medications administered: 48 mg hylan 48 MG/6ML    Patient tolerance: patient tolerated the procedure well with no immediate complications  Dressing:  Sterile dressing applied        Viscosupplementation injection was performed with aseptic technique and tolerated well.  "

## 2025-04-14 LAB
LEFT EYE DIABETIC RETINOPATHY: NORMAL
RIGHT EYE DIABETIC RETINOPATHY: NORMAL

## 2025-04-16 ENCOUNTER — TELEPHONE (OUTPATIENT)
Age: 65
End: 2025-04-16

## 2025-04-16 DIAGNOSIS — I67.2 CEREBRAL ATHEROSCLEROSIS: ICD-10-CM

## 2025-04-16 DIAGNOSIS — H35.09 RETINAL ARTERY PLAQUE: Primary | ICD-10-CM

## 2025-04-16 DIAGNOSIS — I70.8 RETINAL ARTERY PLAQUE: Primary | ICD-10-CM

## 2025-04-16 NOTE — TELEPHONE ENCOUNTER
Caller: Chaz (patient)    Doctor: Dr. Menchaca / [facility]    Reason for call: Patient called to follow up with Dr. Menchaca regarding the findings and recommendations of the recent eye exam Dr. Menchaca put orders in for Diabetes Eye Exam. Patient states eye doctor ([facility]) found a piece of calcium behind eye close to or in blood vessel. Eye doctor recommended Ultrasound of Carotid Artery and other potential cardio tests. Patient states eye doctor was going to fax over to Dr. Menchaca these test recommendations. Patient does not recall eye doctors name or tele #. I do not see any referrals or active requests in chart. Patient requests call back as soon as possible from Dr. Menchaca / Clinical Team to discuss next steps and test orders so he can get these tests completed ASA.    Call back#: [phone]

## 2025-04-16 NOTE — TELEPHONE ENCOUNTER
Called spoke to pt, relayed message as given, pt verbalized understanding. Pt also scheduled for yearly follow up, and p# for CS was provided

## 2025-04-16 NOTE — TELEPHONE ENCOUNTER
Eye exam report is scanned into media with that providers recommendations. Please advise and order appropriate testing.

## 2025-04-17 ENCOUNTER — HOSPITAL ENCOUNTER (OUTPATIENT)
Dept: NON INVASIVE DIAGNOSTICS | Age: 65
Discharge: HOME | End: 2025-04-17
Attending: INTERNAL MEDICINE
Payer: COMMERCIAL

## 2025-04-17 DIAGNOSIS — H35.09 RETINAL ARTERY PLAQUE: ICD-10-CM

## 2025-04-17 DIAGNOSIS — I70.8 RETINAL ARTERY PLAQUE: ICD-10-CM

## 2025-04-17 DIAGNOSIS — I67.2 CEREBRAL ATHEROSCLEROSIS: ICD-10-CM

## 2025-04-17 PROCEDURE — 93880 EXTRACRANIAL BILAT STUDY: CPT

## 2025-04-17 PROCEDURE — 93880 EXTRACRANIAL BILAT STUDY: CPT | Performed by: STUDENT IN AN ORGANIZED HEALTH CARE EDUCATION/TRAINING PROGRAM

## 2025-04-18 ENCOUNTER — RESULTS FOLLOW-UP (OUTPATIENT)
Dept: CARDIOLOGY CLINIC | Facility: CLINIC | Age: 65
End: 2025-04-18

## 2025-05-01 ENCOUNTER — OFFICE VISIT (OUTPATIENT)
Dept: CARDIOLOGY CLINIC | Facility: CLINIC | Age: 65
End: 2025-05-01
Payer: COMMERCIAL

## 2025-05-01 VITALS
WEIGHT: 233 LBS | HEIGHT: 68 IN | HEART RATE: 54 BPM | BODY MASS INDEX: 35.31 KG/M2 | SYSTOLIC BLOOD PRESSURE: 124 MMHG | DIASTOLIC BLOOD PRESSURE: 64 MMHG

## 2025-05-01 DIAGNOSIS — H35.09 RETINAL ARTERY PLAQUE: ICD-10-CM

## 2025-05-01 DIAGNOSIS — I10 ESSENTIAL HYPERTENSION: ICD-10-CM

## 2025-05-01 DIAGNOSIS — E78.2 MIXED HYPERLIPIDEMIA: ICD-10-CM

## 2025-05-01 DIAGNOSIS — I49.3 SYMPTOMATIC PVCS: Primary | ICD-10-CM

## 2025-05-01 DIAGNOSIS — I70.8 RETINAL ARTERY PLAQUE: ICD-10-CM

## 2025-05-01 PROCEDURE — 99214 OFFICE O/P EST MOD 30 MIN: CPT | Performed by: INTERNAL MEDICINE

## 2025-05-01 PROCEDURE — 93000 ELECTROCARDIOGRAM COMPLETE: CPT | Performed by: INTERNAL MEDICINE

## 2025-05-01 RX ORDER — ASPIRIN 81 MG/1
81 TABLET, CHEWABLE ORAL DAILY
COMMUNITY
Start: 2025-05-01

## 2025-05-01 NOTE — PROGRESS NOTES
[facility] [place]  [address]  [facility] [address]  [facility] [address]  Phone#  [phone]  Fax#  [phone]  [facility]'s Cardiology Office Follow-up Visit             NAME: Eugenio Miller  AGE: 65 y.o. SEX: male   : 1960   MRN: [id number]    DATE: 2025  TIME: 9:08 AM    Cardiology Problem list:  No specialty comments available.    Assessment & Plan  Symptomatic PVCs  Minimally symptomatic.  Continue with metoprolol       Essential hypertension  BP is well controlled. Continue with current medical therapy.        Mixed hyperlipidemia  LDL 98.  Continue atorvastatin.        Retinal artery plaque  Add aspirin. Continue statin therapy.   Orders:    POCT ECG    aspirin 81 mg chewable tablet; Chew 1 tablet (81 mg total) daily           HPI:    Eugenio Miller is a 65 y.o.-year-old male who presents to the cardiology clinic for follow up for the above-listed problems.     Recently found to have a [place] plaque by ophtho.  No chest pain or shortness of breath.  Carotid US showed plaque without stenosis.    Past history, family history, social history, current medications, vital signs, recent lab and imaging studies and  prior cardiology studies reviewed independently on this visit.    No Known Allergies    Current Outpatient Medications:     amLODIPine-benazepril (LOTREL) 10-40 MG per capsule, Take 1 capsule by mouth daily, Disp: 90 capsule, Rfl: 1    atorvastatin (LIPITOR) 40 mg tablet, Take 1 tablet (40 mg total) by mouth daily, Disp: 100 tablet, Rfl: 3    Empagliflozin (Jardiance) 10 MG TABS tablet, Take 1 tablet (10 mg total) by mouth in the morning, Disp: 90 tablet, Rfl: 1    glimepiride (AMARYL) 2 mg tablet, Take 1 tablet (2 mg total) by mouth 2 (two) times a day, Disp: 180 tablet, Rfl: 1    metFORMIN (GLUCOPHAGE) 500 mg tablet, Take 1 tablet (500 mg total) by mouth 2 (two) times a day with meals TAKE 1 TABLET WITH BREAKFAST TAKE 2 TABLETS WITH EVENING MEAL, Disp: 180  tablet, Rfl: 1    metoprolol succinate (TOPROL-XL) 25 mg 24 hr tablet, Take 1 tablet (25 mg total) by mouth daily, Disp: 90 tablet, Rfl: 1    omeprazole (PriLOSEC) 40 MG capsule, Take 1 capsule (40 mg total) by mouth daily, Disp: 90 capsule, Rfl: 1    QUEtiapine (SEROquel) 100 mg tablet, Take 1 tablet (100 mg total) by mouth daily at bedtime, Disp: 90 tablet, Rfl: 0    traZODone (DESYREL) 50 mg tablet, Take 1 tablet (50 mg total) by mouth daily at bedtime, Disp: 100 tablet, Rfl: 0    Blood Glucose Monitoring Suppl (OneTouch Verio) w/Device KIT, Use in the morning (Patient not taking: Reported on 4/9/2025), Disp: 1 kit, Rfl: 0    Blood Glucose Monitoring Suppl (True Metrix Air Glucose Meter) w/Device KIT, Use 2 (two) times a day (Patient not taking: Reported on 4/9/2025), Disp: 1 kit, Rfl: 0    glucose blood (OneTouch Verio) test strip, Check blood sugar once a day (Patient not taking: Reported on 4/9/2025), Disp: 100 strip, Rfl: 3    TRUEplus Lancets 30G MISC, TEST BLOOD SUGAR ONE TIME PER DAY. (Patient not taking: Reported on 4/9/2025), Disp: 100 each, Rfl: 1    Review of Systems   Constitutional:  Negative for chills and fever.   HENT:  Negative for ear pain and sore throat.    Eyes:  Negative for pain and visual disturbance.   Respiratory:  Negative for cough and shortness of breath.    Cardiovascular:  Negative for chest pain and palpitations.   Gastrointestinal:  Negative for abdominal pain and vomiting.   Genitourinary:  Negative for dysuria and hematuria.   Musculoskeletal:  Negative for arthralgias and back pain.   Skin:  Negative for color change and rash.   Neurological:  Negative for seizures and syncope.   All other systems reviewed and are negative.      Objective:     Vitals:    05/01/25 0857   BP: 124/64   Pulse: (!) 54     Wt Readings from Last 3 Encounters:   05/01/25 106 kg (233 lb)   04/09/25 103 kg (228 lb)   02/20/25 104 kg (230 lb)     Pulse Readings from Last 3 Encounters:   05/01/25 (!) 54    02/20/25 62   02/17/25 60     BP Readings from Last 3 Encounters:   05/01/25 124/64   02/20/25 131/79   02/17/25 116/56     Physical Exam  Vitals and nursing note reviewed.   Constitutional:       General: He is not in acute distress.     Appearance: He is well-developed.   HENT:      Head: Normocephalic and atraumatic.   Eyes:      Conjunctiva/sclera: Conjunctivae normal.   Cardiovascular:      Rate and Rhythm: Normal rate and regular rhythm.      Heart sounds: No murmur heard.  Pulmonary:      Effort: Pulmonary effort is normal. No respiratory distress.      Breath sounds: Normal breath sounds.   Abdominal:      Palpations: Abdomen is soft.      Tenderness: There is no abdominal tenderness.   Musculoskeletal:         General: No swelling.      Cervical back: Neck supple.   Skin:     General: Skin is warm and dry.      Capillary Refill: Capillary refill takes less than 2 seconds.   Neurological:      Mental Status: He is alert.   Psychiatric:         Mood and Affect: Mood normal.         Pertinent Laboratory/Diagnostic Studies:    Laboratory studies reviewed personally by Jay Menchaca MD    BMP:   Lab Results   Component Value Date    SODIUM 140 02/12/2025    K 4.4 02/12/2025     02/12/2025    CO2 22 02/12/2025    BUN 21 02/12/2025    CREATININE 1.46 (H) 02/12/2025    GLUC 127 (H) 02/12/2025    CALCIUM 9.6 11/21/2017     CBC:  Lab Results   Component Value Date    WBC 8.1 11/01/2024    HGB 13.5 11/01/2024    HCT 41.8 11/01/2024    MCV 90 11/01/2024     11/01/2024     Lipid Profile:   Lab Results   Component Value Date    HDL 36 (L) 02/12/2025     Lab Results   Component Value Date    LDLCALC 98 02/12/2025     Lab Results   Component Value Date    TRIG 192 (H) 02/12/2025      Other labs:  Lab Results   Component Value Date    INW0YZQXFELW 1.750 07/06/2016    TSH 1.460 08/08/2024     Lab Results   Component Value Date    ALT 22 02/12/2025    AST 20 02/12/2025     Lab Results   Component Value Date     "HGBA1C 6.5 (H) 02/12/2025       EKG: Si  nus bradycardia   Imaging Studies:     Pertinent imaging studies and cardiac studies were independently reviewed on this visit and findings summarized.    I have spent a total time of 25  minutes in caring for this patient on the day of the visit/encounter including reviewing and entering of medical history, physical examination, diagnostic results, instructions for management, patient education, risk factor reduction, documenting in the medical record, sending communications to other providers, reviewing/ordering tests, medicine, procedures etc.    Jay Mnechaca MD, [facility]    Portions of the record may have been created with voice recognition software.  Occasional wrong word or \"sound alike\" substitutions may have occurred due to the inherent limitations of voice recognition software.  Read the chart carefully and recognize, using context, where substitutions have occurred. Please reach out to me directly for any clarifications.  "

## 2025-05-06 DIAGNOSIS — E78.2 MIXED HYPERLIPIDEMIA: ICD-10-CM

## 2025-05-06 DIAGNOSIS — E11.65 TYPE 2 DIABETES MELLITUS WITH HYPERGLYCEMIA, WITHOUT LONG-TERM CURRENT USE OF INSULIN (HCC): ICD-10-CM

## 2025-05-06 RX ORDER — ATORVASTATIN CALCIUM 40 MG/1
40 TABLET, FILM COATED ORAL DAILY
Qty: 100 TABLET | Refills: 1 | Status: SHIPPED | OUTPATIENT
Start: 2025-05-06

## 2025-05-06 NOTE — TELEPHONE ENCOUNTER
Patient is going on vacation on May 11th for a week and will run out of his Atorvastatin. Please approve vacation override for Atorvastatin refill.  Thank you!!    Medication: metFORMIN (GLUCOPHAGE) 500 mg tablet     Dose/Frequency:     Take 1 tablet (500 mg total) by mouth 2 (two) times a day with meals TAKE 1 TABLET WITH BREAKFAST TAKE 2 TABLETS WITH EVENING MEAL       Quantity: 180 tablet     Pharmacy: [id number], [address]     Office:   [x] PCP/Provider -   [] Speciality/Provider -     Does the patient have enough for 3 days?   [] Yes   [x] No - Send as HP to POD     Medication:     atorvastatin (LIPITOR) 40 mg tablet       Dose/Frequency: Take 1 tablet (40 mg total) by mouth daily     Quantity: 100 tablet     Pharmacy: [id number] [place], [address]     Office:   [x] PCP/Provider -   [] Speciality/Provider -     Does the patient have enough for 3 days?   [x] Yes   [] No - Send as HP to POD

## 2025-05-16 DIAGNOSIS — E11.65 TYPE 2 DIABETES MELLITUS WITH HYPERGLYCEMIA, WITHOUT LONG-TERM CURRENT USE OF INSULIN (HCC): ICD-10-CM

## 2025-05-16 DIAGNOSIS — I10 ESSENTIAL HYPERTENSION: ICD-10-CM

## 2025-05-16 RX ORDER — AMLODIPINE AND BENAZEPRIL HYDROCHLORIDE 10; 40 MG/1; MG/1
1 CAPSULE ORAL DAILY
Qty: 90 CAPSULE | Refills: 1 | Status: SHIPPED | OUTPATIENT
Start: 2025-05-16

## 2025-05-16 RX ORDER — GLIMEPIRIDE 2 MG/1
2 TABLET ORAL 2 TIMES DAILY
Qty: 180 TABLET | Refills: 1 | Status: SHIPPED | OUTPATIENT
Start: 2025-05-16

## 2025-05-16 RX ORDER — METOPROLOL SUCCINATE 25 MG/1
25 TABLET, EXTENDED RELEASE ORAL DAILY
Qty: 90 TABLET | Refills: 1 | Status: SHIPPED | OUTPATIENT
Start: 2025-05-16

## 2025-05-19 DIAGNOSIS — E11.65 TYPE 2 DIABETES MELLITUS WITH HYPERGLYCEMIA, WITHOUT LONG-TERM CURRENT USE OF INSULIN (HCC): ICD-10-CM

## 2025-05-20 RX ORDER — EMPAGLIFLOZIN 10 MG/1
10 TABLET, FILM COATED ORAL EVERY MORNING
Qty: 90 TABLET | Refills: 1 | Status: SHIPPED | OUTPATIENT
Start: 2025-05-20 | End: 2025-05-22 | Stop reason: SDUPTHER

## 2025-05-22 DIAGNOSIS — E11.65 TYPE 2 DIABETES MELLITUS WITH HYPERGLYCEMIA, WITHOUT LONG-TERM CURRENT USE OF INSULIN (HCC): ICD-10-CM

## 2025-05-22 NOTE — TELEPHONE ENCOUNTER
Pt asked for the Trazadone needs a refill but wanted to know if Doctor could up it to 100mg from 50mg.  He's taking 1/2 pill of QUEtiapine (SEROquel) 100 mg tablet  with the 50mg Trazadone and it's just not working he said.  That's why wondering if Dr could up the Trazadone 100mg.        Medication: Jardiance 10 MG TABS tablet     Dose/Frequency: TAKE 1 TABLET (10 MG TOTAL) BY MOUTH IN THE MORNING     Quantity: 90    Pharmacy: [facility]    Office:   [x] PCP/Provider - Dr Barrera  [] Speciality/Provider -     Does the patient have enough for 3 days?   [] Yes   [x] No - Send as HP to POD

## 2025-05-24 DIAGNOSIS — F41.9 INSOMNIA SECONDARY TO ANXIETY: ICD-10-CM

## 2025-05-24 DIAGNOSIS — F51.05 INSOMNIA SECONDARY TO ANXIETY: ICD-10-CM

## 2025-05-27 DIAGNOSIS — K21.00 GASTROESOPHAGEAL REFLUX DISEASE WITH ESOPHAGITIS: ICD-10-CM

## 2025-05-28 RX ORDER — OMEPRAZOLE 40 MG/1
40 CAPSULE, DELAYED RELEASE ORAL DAILY
Qty: 90 CAPSULE | Refills: 1 | Status: SHIPPED | OUTPATIENT
Start: 2025-05-28

## 2025-05-28 RX ORDER — QUETIAPINE FUMARATE 100 MG/1
100 TABLET, FILM COATED ORAL
Qty: 90 TABLET | Refills: 0 | Status: SHIPPED | OUTPATIENT
Start: 2025-05-28

## 2025-05-29 DIAGNOSIS — G47.00 INSOMNIA, UNSPECIFIED TYPE: ICD-10-CM

## 2025-05-30 RX ORDER — TRAZODONE HYDROCHLORIDE 50 MG/1
50 TABLET ORAL
Qty: 90 TABLET | Refills: 1 | Status: SHIPPED | OUTPATIENT
Start: 2025-05-30

## 2025-06-09 ENCOUNTER — HOSPITAL ENCOUNTER (OUTPATIENT)
Dept: ULTRASOUND IMAGING | Facility: HOSPITAL | Age: 65
Discharge: HOME | End: 2025-06-09
Payer: COMMERCIAL

## 2025-06-09 DIAGNOSIS — N28.1 BILATERAL RENAL CYSTS: ICD-10-CM

## 2025-06-09 PROCEDURE — 76775 US EXAM ABDO BACK WALL LIM: CPT

## 2025-06-13 ENCOUNTER — TELEPHONE (OUTPATIENT)
Age: 65
End: 2025-06-13

## 2025-06-13 NOTE — TELEPHONE ENCOUNTER
Pt calling in to check if labs were placed by Dr. Barrera to check for next follow up visit; advised him it was ordered 2/17 and can be completed and requires fasting. Pt also curious about his results from his recent US; advised him results still show in process and to call office back if he does not hear anything middle of next week.

## 2025-06-17 ENCOUNTER — RESULTS FOLLOW-UP (OUTPATIENT)
Dept: OTHER | Facility: HOSPITAL | Age: 65
End: 2025-06-17

## 2025-06-17 LAB
ALBUMIN SERPL-MCNC: 4.5 G/DL (ref 3.9–4.9)
ALBUMIN/CREAT UR: 45 MG/G CREAT (ref 0–29)
ALP SERPL-CCNC: 84 IU/L (ref 44–121)
ALT SERPL-CCNC: 18 IU/L (ref 0–44)
AST SERPL-CCNC: 16 IU/L (ref 0–40)
BILIRUB SERPL-MCNC: 0.3 MG/DL (ref 0–1.2)
BUN SERPL-MCNC: 23 MG/DL (ref 8–27)
BUN/CREAT SERPL: 15 (ref 10–24)
CALCIUM SERPL-MCNC: 9.6 MG/DL (ref 8.6–10.2)
CHLORIDE SERPL-SCNC: 103 MMOL/L (ref 96–106)
CHOLEST SERPL-MCNC: 162 MG/DL (ref 100–199)
CO2 SERPL-SCNC: 20 MMOL/L (ref 20–29)
CREAT SERPL-MCNC: 1.54 MG/DL (ref 0.76–1.27)
CREAT UR-MCNC: 146.7 MG/DL
EGFR: 49 ML/MIN/1.73
EST. AVERAGE GLUCOSE BLD GHB EST-MCNC: 143 MG/DL
GLOBULIN SER-MCNC: 2.3 G/DL (ref 1.5–4.5)
GLUCOSE SERPL-MCNC: 103 MG/DL (ref 70–99)
HBA1C MFR BLD: 6.6 % (ref 4.8–5.6)
HDLC SERPL-MCNC: 36 MG/DL
LDLC SERPL CALC-MCNC: 94 MG/DL (ref 0–99)
LDLC/HDLC SERPL: 2.6 RATIO (ref 0–3.6)
MICROALBUMIN UR-MCNC: 66 UG/ML
POTASSIUM SERPL-SCNC: 4.6 MMOL/L (ref 3.5–5.2)
PROT SERPL-MCNC: 6.8 G/DL (ref 6–8.5)
SL AMB VLDL CHOLESTEROL CALC: 32 MG/DL (ref 5–40)
SODIUM SERPL-SCNC: 141 MMOL/L (ref 134–144)
TRIGL SERPL-MCNC: 184 MG/DL (ref 0–149)

## 2025-06-17 NOTE — TELEPHONE ENCOUNTER
Message left for pt advising of note below. Ok per communication consent to leave V/M       ----- Message from Daniela Moncada PA-C sent at 6/17/2025  9:22 AM EDT -----  Renal ultrasound reviewed: Bilateral renal cyst again visualized, some team mildly complex but have not significantly changed in size from ultrasound completed March 2024.  Bladder is overall   unremarkable.  No kidney stones or kidney swelling noted.    No further intervention needed at this time, continue to follow-up on an annual basis with repeat ultrasounds annually for ongoing surveillance.  ----- Message -----  From: Interface, Radiology Results In  Sent: 6/16/2025   2:29 PM EDT  To: Daniela Moncada PA-C

## 2025-06-17 NOTE — RESULT ENCOUNTER NOTE
Renal ultrasound reviewed: Bilateral renal cyst again visualized, some team mildly complex but have not significantly changed in size from ultrasound completed March 2024.  Bladder is overall unremarkable.  No kidney stones or kidney swelling noted.    No further intervention needed at this time, continue to follow-up on an annual basis with repeat ultrasounds annually for ongoing surveillance.

## 2025-06-30 ENCOUNTER — OFFICE VISIT (OUTPATIENT)
Dept: FAMILY MEDICINE CLINIC | Facility: HOSPITAL | Age: 65
End: 2025-06-30
Payer: COMMERCIAL

## 2025-06-30 VITALS
WEIGHT: 225 LBS | HEIGHT: 68 IN | OXYGEN SATURATION: 94 % | DIASTOLIC BLOOD PRESSURE: 60 MMHG | BODY MASS INDEX: 34.1 KG/M2 | SYSTOLIC BLOOD PRESSURE: 102 MMHG | TEMPERATURE: 98.9 F | HEART RATE: 74 BPM

## 2025-06-30 DIAGNOSIS — H61.21 RIGHT EAR IMPACTED CERUMEN: ICD-10-CM

## 2025-06-30 DIAGNOSIS — I10 ESSENTIAL HYPERTENSION: ICD-10-CM

## 2025-06-30 DIAGNOSIS — J40 BRONCHITIS: Primary | ICD-10-CM

## 2025-06-30 DIAGNOSIS — G47.33 OBSTRUCTIVE SLEEP APNEA: ICD-10-CM

## 2025-06-30 PROCEDURE — 69210 REMOVE IMPACTED EAR WAX UNI: CPT | Performed by: NURSE PRACTITIONER

## 2025-06-30 PROCEDURE — 99214 OFFICE O/P EST MOD 30 MIN: CPT | Performed by: NURSE PRACTITIONER

## 2025-06-30 RX ORDER — DOXYCYCLINE HYCLATE 100 MG
100 TABLET ORAL 2 TIMES DAILY
Qty: 14 TABLET | Refills: 0 | Status: SHIPPED | OUTPATIENT
Start: 2025-06-30 | End: 2025-07-07

## 2025-06-30 RX ORDER — ALBUTEROL SULFATE 90 UG/1
2 INHALANT RESPIRATORY (INHALATION) EVERY 6 HOURS PRN
Qty: 18 G | Refills: 5 | Status: SHIPPED | OUTPATIENT
Start: 2025-06-30

## 2025-06-30 NOTE — ASSESSMENT & PLAN NOTE
History of hypertension well-controlled at today's office.  Reports medication adherence to amlodipine-benazepril 10/40 mg p.o. daily, Toprol 25 mg p.o. daily.  Denies chest pain pressure dizziness lightheadedness nor headache.  Electrolyte and kidney function relatively baseline at 6/16/2025.  Discussed optimizing hydration nutrition and blood sugar management with verbalized understanding.

## 2025-06-30 NOTE — PATIENT INSTRUCTIONS
Doxycyline as directed  Use of albuterol with spacer   Use of coricidin cough syrup.    Optimize hydration and nutrition.

## 2025-06-30 NOTE — PROGRESS NOTES
[facility]   [facility]    Assessment/Plan:   1. Bronchitis  Assessment & Plan:  Started with chest congestion and coughing onset 4-5 days worsening.  No overt fever/chills.  Appetite unchanged.  Staying well hydrated.  BS stable; no hyper/hypoglycemia.   Able to sleep with CPAP.  Not using any medications to treat symptoms. No sick contacts to his knowledge.   NAD, afebrile, VSS.  Right ear impaction, mild nasal congestion without sinus pressure pain on palpation.  + PND without exudate/petechiae.  No cervical adenopathy present.  Lungs with upper rhonchi and diffuse wheeze present.  Slightly diminished LLL.  MNPC.    - Concerns for bronchitis with possible development into pneumonia.  Will treat with course of doxycycline as well as use of albuterol with spacer consistently over the next few days.  Advise use of Coricidin cough syrup optimize hydration nutrition.  If no improvement consider imaging.      Orders:  -     doxycycline hyclate (VIBRA-TABS) 100 mg tablet; Take 1 tablet (100 mg total) by mouth 2 (two) times a day for 7 days  -     albuterol (Ventolin HFA) 90 mcg/act inhaler; Inhale 2 puffs every 6 (six) hours as needed for wheezing  -     Spacer/Aero-Holding Chambers KAROL; Use in the morning  2. Essential hypertension  Assessment & Plan:  History of hypertension well-controlled at today's office.  Reports medication adherence to amlodipine-benazepril 10/40 mg p.o. daily, Toprol 25 mg p.o. daily.  Denies chest pain pressure dizziness lightheadedness nor headache.  Electrolyte and kidney function relatively baseline at 6/16/2025.  Discussed optimizing hydration nutrition and blood sugar management with verbalized understanding.  3. Obstructive sleep apnea  Assessment & Plan:  History of RAOUL with CPAP compliance.    4. Right ear impacted cerumen  -     Ear cerumen removal      Doxycyline as directed  Use of albuterol with spacer   Use of coricidin cough syrup.    Optimize hydration  and nutrition.    No follow-ups on file.  Patient may call or return to office with any questions or concerns.     ______________________________________________________________________  Subjective:     Patient ID: Eugenio Miller is a 65 y.o. male.  Eugenio Miller  Chief Complaint   Patient presents with    URI    Cough     Deep in the chest     Wheezing    Shortness of Breath     X 5 days +     Per A/P               The following portions of the patient's history were reviewed and updated as appropriate: allergies, current medications, past family history, past medical history, past social history, past surgical history, and problem list.    Review of Systems   Constitutional: Negative.  Negative for activity change, appetite change, chills, fatigue, fever and unexpected weight change.   HENT:  Positive for sneezing. Negative for congestion, ear pain, postnasal drip, sinus pressure, sinus pain and trouble swallowing.         Sensation of left ear fullness   Sneezed x2 in the past week.   Eyes: Negative.  Negative for itching.   Respiratory:  Positive for cough, shortness of breath and wheezing.         Chest congestion   Cardiovascular: Negative.  Negative for chest pain, palpitations and leg swelling.   Gastrointestinal: Negative.  Negative for constipation and diarrhea.   Endocrine: Negative.    Genitourinary: Negative.  Negative for dysuria.   Musculoskeletal: Negative.    Skin: Negative.    Allergic/Immunologic: Negative.  Negative for environmental allergies and immunocompromised state.   Neurological:  Positive for headaches. Negative for dizziness and light-headedness.        Mild tension HA this morning.    Hematological: Negative.    Psychiatric/Behavioral: Negative.  Negative for sleep disturbance.          Objective:      Vitals:    06/30/25 1500   BP: 102/60   Pulse: 74   Temp: 98.9 °F (37.2 °C)   SpO2: 94%      Physical Exam  Vitals and nursing note reviewed.   Constitutional:       General: He  is awake.      Appearance: Normal appearance. He is obese.   HENT:      Head: Normocephalic and atraumatic.      Right Ear: Tympanic membrane, ear canal and external ear normal. There is impacted cerumen. No mastoid tenderness.      Left Ear: Tympanic membrane, ear canal and external ear normal. No tenderness.  No middle ear effusion. No mastoid tenderness.      Nose: Congestion present.      Mouth/Throat:      Mouth: Mucous membranes are moist.      Pharynx: Oropharynx is clear. Postnasal drip present. No oropharyngeal exudate.     Eyes:      Extraocular Movements: Extraocular movements intact.      Conjunctiva/sclera: Conjunctivae normal.      Pupils: Pupils are equal, round, and reactive to light.       Cardiovascular:      Rate and Rhythm: Normal rate and regular rhythm.      Pulses: Normal pulses.      Heart sounds: Normal heart sounds.   Pulmonary:      Effort: Pulmonary effort is normal.      Breath sounds: Wheezing and rhonchi present. No rales.      Comments: Strong MNPC  Abdominal:      General: Bowel sounds are normal.      Palpations: Abdomen is soft.     Musculoskeletal:         General: Normal range of motion.      Cervical back: Normal range of motion and neck supple.      Right lower leg: No edema.      Left lower leg: No edema.     Skin:     General: Skin is warm and dry.     Neurological:      General: No focal deficit present.      Mental Status: He is alert and oriented to person, place, and time.     Psychiatric:         Mood and Affect: Mood normal.         Behavior: Behavior normal. Behavior is cooperative.         Thought Content: Thought content normal.         Judgment: Judgment normal.     Ear cerumen removal    Date/Time: 6/30/2025 3:00 PM    Performed by: MICKI Hartman  Authorized by: MICKI Hartman    Universal Protocol:  procedure performed by consultantConsent: Verbal consent obtained  Risks and benefits: risks, benefits and alternatives were discussed  Consent given by:  "patient  Time out: Immediately prior to procedure a \"time out\" was called to verify the correct patient, procedure, equipment, support staff and site/side marked as required.  Timeout called at: 6/30/2025 3:41 PM.  Patient understanding: patient states understanding of the procedure being performed  Patient consent: the patient's understanding of the procedure matches consent given  Patient identity confirmed: verbally with patient    Patient location:  Clinic  Procedure details:     Local anesthetic:  None    Location:  R ear    Procedure type: irrigation with instrumentation      Instrumentation: curette      Approach:  External  Post-procedure details:     Complication:  None    Hearing quality:  Improved    Patient tolerance of procedure:  Tolerated well, no immediate complications        Portions of the record may have been created with voice recognition software. Occasional wrong word or \"sound alike\" substitutions may have occurred due to the inherent limitations of voice recognition software. Please review the chart carefully and recognize, using context, where substitutions/typographical errors may have occurred.       "

## 2025-06-30 NOTE — ASSESSMENT & PLAN NOTE
Started with chest congestion and coughing onset 4-5 days worsening.  No overt fever/chills.  Appetite unchanged.  Staying well hydrated.  BS stable; no hyper/hypoglycemia.   Able to sleep with CPAP.  Not using any medications to treat symptoms. No sick contacts to his knowledge.   NAD, afebrile, VSS.  Right ear impaction, mild nasal congestion without sinus pressure pain on palpation.  + PND without exudate/petechiae.  No cervical adenopathy present.  Lungs with upper rhonchi and diffuse wheeze present.  Slightly diminished LLL.  MNPC.    - Concerns for bronchitis with possible development into pneumonia.  Will treat with course of doxycycline as well as use of albuterol with spacer consistently over the next few days.  Advise use of Coricidin cough syrup optimize hydration nutrition.  If no improvement consider imaging.

## 2025-07-10 ENCOUNTER — TELEPHONE (OUTPATIENT)
Age: 65
End: 2025-07-10

## 2025-07-10 NOTE — TELEPHONE ENCOUNTER
Pt is now taking   metFORMIN (GLUCOPHAGE) 500 mg tablet   And glimepiride (AMARYL) 2 mg tablet for diabetes. He is wondering if his blood sugar is ok at the time to take the medication should he still take the medications? Please advise

## 2025-07-11 NOTE — TELEPHONE ENCOUNTER
Please call.     To take metformin and amaryl.   No hold on metformin.     If BS is less than 90  he can skip the amaryl.

## 2025-07-11 NOTE — TELEPHONE ENCOUNTER
Eugenio returned call, relayed providers message verbatim, did not have any further questions or concerns at the moment.    KIERAN

## 2025-08-13 ENCOUNTER — TELEPHONE (OUTPATIENT)
Age: 65
End: 2025-08-13

## 2025-08-20 ENCOUNTER — OFFICE VISIT (OUTPATIENT)
Dept: FAMILY MEDICINE CLINIC | Facility: HOSPITAL | Age: 65
End: 2025-08-20
Payer: COMMERCIAL

## 2025-08-20 VITALS
BODY MASS INDEX: 34.1 KG/M2 | WEIGHT: 225 LBS | OXYGEN SATURATION: 95 % | SYSTOLIC BLOOD PRESSURE: 124 MMHG | HEIGHT: 68 IN | HEART RATE: 68 BPM | DIASTOLIC BLOOD PRESSURE: 64 MMHG

## 2025-08-20 DIAGNOSIS — G47.00 INSOMNIA, UNSPECIFIED TYPE: ICD-10-CM

## 2025-08-20 DIAGNOSIS — E11.65 TYPE 2 DIABETES MELLITUS WITH HYPERGLYCEMIA, WITHOUT LONG-TERM CURRENT USE OF INSULIN (HCC): ICD-10-CM

## 2025-08-20 DIAGNOSIS — F51.05 INSOMNIA SECONDARY TO ANXIETY: ICD-10-CM

## 2025-08-20 DIAGNOSIS — F41.9 INSOMNIA SECONDARY TO ANXIETY: ICD-10-CM

## 2025-08-20 PROBLEM — K76.0 HEPATIC STEATOSIS: Status: ACTIVE | Noted: 2025-08-20

## 2025-08-20 PROCEDURE — 99214 OFFICE O/P EST MOD 30 MIN: CPT | Performed by: FAMILY MEDICINE

## 2025-08-20 RX ORDER — TRAZODONE HYDROCHLORIDE 50 MG/1
50 TABLET ORAL
Qty: 90 TABLET | Refills: 1 | Status: SHIPPED | OUTPATIENT
Start: 2025-08-20

## 2025-08-20 RX ORDER — GLIMEPIRIDE 2 MG/1
2 TABLET ORAL
Qty: 30 TABLET | Refills: 2 | Status: SHIPPED | OUTPATIENT
Start: 2025-08-20 | End: 2025-11-18

## 2025-08-20 RX ORDER — QUETIAPINE FUMARATE 25 MG/1
25 TABLET, FILM COATED ORAL
Qty: 30 TABLET | Refills: 0 | Status: SHIPPED | OUTPATIENT
Start: 2025-08-20 | End: 2025-09-19